# Patient Record
Sex: FEMALE | Race: WHITE | NOT HISPANIC OR LATINO | Employment: UNEMPLOYED | URBAN - METROPOLITAN AREA
[De-identification: names, ages, dates, MRNs, and addresses within clinical notes are randomized per-mention and may not be internally consistent; named-entity substitution may affect disease eponyms.]

---

## 2017-01-18 ENCOUNTER — ALLSCRIPTS OFFICE VISIT (OUTPATIENT)
Dept: OTHER | Facility: OTHER | Age: 57
End: 2017-01-18

## 2017-01-18 DIAGNOSIS — R73.01 IMPAIRED FASTING GLUCOSE: ICD-10-CM

## 2017-01-18 DIAGNOSIS — I10 ESSENTIAL (PRIMARY) HYPERTENSION: ICD-10-CM

## 2017-06-16 ENCOUNTER — ALLSCRIPTS OFFICE VISIT (OUTPATIENT)
Dept: OTHER | Facility: OTHER | Age: 57
End: 2017-06-16

## 2017-07-31 ENCOUNTER — ALLSCRIPTS OFFICE VISIT (OUTPATIENT)
Dept: OTHER | Facility: OTHER | Age: 57
End: 2017-07-31

## 2017-08-25 ENCOUNTER — ALLSCRIPTS OFFICE VISIT (OUTPATIENT)
Dept: OTHER | Facility: OTHER | Age: 57
End: 2017-08-25

## 2017-10-16 ENCOUNTER — ALLSCRIPTS OFFICE VISIT (OUTPATIENT)
Dept: OTHER | Facility: OTHER | Age: 57
End: 2017-10-16

## 2017-10-16 DIAGNOSIS — I10 ESSENTIAL (PRIMARY) HYPERTENSION: ICD-10-CM

## 2017-10-16 DIAGNOSIS — R73.01 IMPAIRED FASTING GLUCOSE: ICD-10-CM

## 2017-10-16 DIAGNOSIS — Z12.31 ENCOUNTER FOR SCREENING MAMMOGRAM FOR MALIGNANT NEOPLASM OF BREAST: ICD-10-CM

## 2017-10-16 DIAGNOSIS — R92.8 OTHER ABNORMAL AND INCONCLUSIVE FINDINGS ON DIAGNOSTIC IMAGING OF BREAST: ICD-10-CM

## 2017-10-17 NOTE — PROGRESS NOTES
Assessment  1  Hypertension (401 9) (I10)    Plan  Body mass index (BMI) of greater than 35 to 39 9 with comorbidity, Hypertension,  Impaired fasting glucose    · (1) CBC/PLT/DIFF; Status:Active; Requested for:16Oct2017;    · (1) COMPREHENSIVE METABOLIC PANEL; Status:Active; Requested for:16Oct2017;    · (1) HEMOGLOBIN A1C; Status:Active; Requested for:16Oct2017;    · (1) LIPID PANEL, FASTING; Status:Active; Requested for:16Oct2017;    · (1) MICROALBUMIN CREATININE RATIO, RANDOM URINE; Status:Active; Requested  for:16Oct2017;    · (1) TSH; Status:Active; Requested for:16Oct2017;   Hypertension    · HydroCHLOROthiazide 12 5 MG Oral Tablet   · From  Losartan Potassium 50 MG Oral Tablet take one tablet by mouth every  day To Losartan Potassium 50 MG Oral Tablet TAKE 1 TABLET TWICE DAILY   · Follow-up visit in 3 weeks Evaluation and Treatment  Follow-up  Status: Hold For -  Scheduling  Requested for: 16JWS3345    Discussion/Summary    Pt is to take Losartan 50 mg twice a day  Lab order is given and she is to return in 2-3 weeks for recheck  The patient was counseled regarding instructions for management,-- risk factor reductions,-- impressions,-- risks and benefits of treatment options,-- importance of compliance with treatment  Possible side effects of new medications were reviewed with the patient/guardian today  The treatment plan was reviewed with the patient/guardian  The patient/guardian understands and agrees with the treatment plan     Self Referrals: No      Chief Complaint  Pt  in office today c/o elevated blood pressure  Pt  reports head pressure  History of Present Illness  HPI: Pt went to a yoga class at work last week and has had elevated blood pressure readings since then  She denies SOB, chest pain  Hypertension (Follow-Up): The patient presents for follow-up of essential hypertension  She has no comorbid illnesses  Symptoms: The patient is currently asymptomatic   Associated symptoms include no headache,-- no focal neurologic deficits-- and-- no memory loss  Home monitoring: The patient checks her blood pressure regularly  Medications: the patient is adherent with her medication regimen  -- She denies medication side effects  The patient is due for a lipid panel,-- a serum creatinine,-- an eye exam-- and-- a urine microalbumin  Review of Systems    Constitutional: No fever, no chills, feels well, no tiredness, no recent weight gain or loss  Cardiovascular: as noted in HPI  Respiratory: no complaints of shortness of breath, no wheezing, no dyspnea on exertion, no orthopnea or PND  ROS reviewed  Active Problems  1  Acid reflux disease (530 81) (K21 9)   2  Body mass index (BMI) of greater than 35 to 39 9 with comorbidity   3  History of rosacea (V13 3) (Z87 2)   4  Hypertension (401 9) (I10)   5  Impaired fasting glucose (790 21) (R73 01)   6  Insomnia (780 52) (G47 00)   7  Lumbar radiculopathy (724 4) (M54 16)   8  Restless leg syndrome (333 94) (G25 81)   9  Right knee pain, unspecified chronicity (719 46) (M25 561)    Past Medical History  1  History of Asymptomatic postmenopausal state (V49 81) (Z78 0)   2  History of Colon cancer screening (V76 51) (Z12 11)   3  History of Encounter for gynecological examination without abnormal finding (V72 31)   (Z01 419)   4  History of Encounter for gynecological examination without abnormal finding (V72 31)   (Z01 419)   5  History of Encounter for mammogram to establish baseline mammogram (V76 12)   (Z12 31)   6  History of Encounter for screening mammogram for malignant neoplasm of breast   (V76 12) (Z12 31)   7  History of acne (V13 3) (Z87 2)   8  History of acute sinusitis (V12 69) (Z87 09)   9  History of common cold (V12 09) (Z86 19)   10  History of influenza vaccination (V49 89) (Z92 29)   11  History of rosacea (V13 3) (Z87 2)   12  History of tonsillitis (V12 69) (Z87 09)   13  Hypertension (401 9) (I10)   14   Impaired fasting glucose (790 21) (R73 01)   15  History of Screening for diabetes mellitus (V77 1) (Z13 1)   16  History of Screening for human papillomavirus (HPV) (V73 81) (Z11 51)   17  History of Tonsillolith (474 8) (J35 8)   18  History of URI (upper respiratory infection) (465 9) (J06 9)  Active Problems And Past Medical History Reviewed: The active problems and past medical history were reviewed and updated today  Family History  Mother    1  Family history of COPD, moderate   2  Family history of congestive heart failure (V17 49) (Z82 49)   3  Family history of hyperlipidemia (V18 19) (Z83 49)   4  Family history of hypertension (V17 49) (Z82 49)   5  Denied: Family history of mental disorder   6  Denied: Family history of substance abuse  Father    7  Family history of COPD, moderate   8  Denied: Family history of mental disorder   9  Denied: Family history of substance abuse  Maternal Aunt    10  Family history of malignant neoplasm of urinary bladder (V16 52) (Z80 52)  Family History Reviewed: The family history was reviewed and updated today  Social History   ·    · Former smoker (V33 05) (W76 395)   · History of Lives with family   · No alcohol use   · No caffeine use   · Not currently sexually active   · 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  The social history was reviewed and is unchanged  Surgical History  1  History of Excision Of Lesion Genitalia Benign   2  History of Hysterectomy   3  History of Oophorectomy Unilateral Right Side   4  History of Tubal Ligation    Current Meds   1  Losartan Potassium 50 MG Oral Tablet; take one tablet by mouth every day; Therapy: 21DAU6973 to (Isis Kent)  Requested for: 03QBE7835; Last   Rx:59Bvz1526 Ordered   2  Metoprolol Succinate  MG Oral Tablet Extended Release 24 Hour; TAKE 1   TABLET DAILY; Therapy: 41KVU8177 to (Evaluate:60Jrz7692)  Requested for: 93ZTB2988; Last   Rx:03Dng5292 Ordered   3  MetroNIDAZOLE 0 75 % External Lotion; Apply to face BID; Therapy: 09ZSG2597 to (Last Rx:90Iqq8768)  Requested for: 28UPF7655 Ordered   4  Omeprazole 20 MG Oral Capsule Delayed Release; Take one capsule once daily before   eating; Therapy: 01YEJ9259 to [phone])  Requested for: 42GQH5256; Last   Rx:38Tbs5363 Ordered    The medication list was reviewed and updated today  Allergies  1  sulfa   2  tetracycline    Vitals   Recorded: 58PIM9009 04:47PM Recorded: 96MWX5438 04:32PM   Heart Rate  95   Systolic 944 963   Diastolic 82 96   Height  5 ft 4 5 in   Weight  224 lb    BMI Calculated  37 86   BSA Calculated  2 06   O2 Saturation  96     Physical Exam    Constitutional   General appearance: No acute distress, well appearing and well nourished  Pulmonary   Respiratory effort: No increased work of breathing or signs of respiratory distress  Auscultation of lungs: Clear to auscultation  Cardiovascular   Auscultation of heart: Normal rate and rhythm, normal S1 and S2, without murmurs      Examination of extremities for edema and/or varicosities: Normal          Future Appointments    Date/Time Provider Specialty Site   12/27/2017 09:00 AM Dale Parekh, [address]     Signatures   Electronically signed by : Magan Cagle, [facility]; Oct 16 2017  5:02PM EST                       (Author)    Electronically signed by : BRYAN Roberts ; Oct 16 2017  7:12PM EST

## 2017-10-19 ENCOUNTER — APPOINTMENT (OUTPATIENT)
Dept: LAB | Facility: CLINIC | Age: 57
End: 2017-10-19
Payer: COMMERCIAL

## 2017-10-19 DIAGNOSIS — I10 ESSENTIAL (PRIMARY) HYPERTENSION: ICD-10-CM

## 2017-10-19 DIAGNOSIS — R73.01 IMPAIRED FASTING GLUCOSE: ICD-10-CM

## 2017-10-19 LAB
ALBUMIN SERPL BCP-MCNC: 3.7 G/DL (ref 3.5–5)
ALP SERPL-CCNC: 108 U/L (ref 46–116)
ALT SERPL W P-5'-P-CCNC: 33 U/L (ref 12–78)
ANION GAP SERPL CALCULATED.3IONS-SCNC: 5 MMOL/L (ref 4–13)
AST SERPL W P-5'-P-CCNC: 21 U/L (ref 5–45)
BASOPHILS # BLD AUTO: 0.02 THOUSANDS/ΜL (ref 0–0.1)
BASOPHILS NFR BLD AUTO: 0 % (ref 0–1)
BILIRUB SERPL-MCNC: 0.44 MG/DL (ref 0.2–1)
BUN SERPL-MCNC: 18 MG/DL (ref 5–25)
CALCIUM SERPL-MCNC: 9.2 MG/DL (ref 8.3–10.1)
CHLORIDE SERPL-SCNC: 106 MMOL/L (ref 100–108)
CHOLEST SERPL-MCNC: 152 MG/DL (ref 50–200)
CO2 SERPL-SCNC: 28 MMOL/L (ref 21–32)
CREAT SERPL-MCNC: 0.96 MG/DL (ref 0.6–1.3)
CREAT UR-MCNC: 84.2 MG/DL
EOSINOPHIL # BLD AUTO: 0.27 THOUSAND/ΜL (ref 0–0.61)
EOSINOPHIL NFR BLD AUTO: 3 % (ref 0–6)
ERYTHROCYTE [DISTWIDTH] IN BLOOD BY AUTOMATED COUNT: 13.5 % (ref 11.6–15.1)
EST. AVERAGE GLUCOSE BLD GHB EST-MCNC: 126 MG/DL
GFR SERPL CREATININE-BSD FRML MDRD: 65 ML/MIN/1.73SQ M
GLUCOSE P FAST SERPL-MCNC: 81 MG/DL (ref 65–99)
HBA1C MFR BLD: 6 % (ref 4.2–6.3)
HCT VFR BLD AUTO: 43.3 % (ref 34.8–46.1)
HDLC SERPL-MCNC: 39 MG/DL (ref 40–60)
HGB BLD-MCNC: 14.4 G/DL (ref 11.5–15.4)
LDLC SERPL CALC-MCNC: 90 MG/DL (ref 0–100)
LYMPHOCYTES # BLD AUTO: 2.57 THOUSANDS/ΜL (ref 0.6–4.47)
LYMPHOCYTES NFR BLD AUTO: 32 % (ref 14–44)
MCH RBC QN AUTO: 28.1 PG (ref 26.8–34.3)
MCHC RBC AUTO-ENTMCNC: 33.3 G/DL (ref 31.4–37.4)
MCV RBC AUTO: 84 FL (ref 82–98)
MICROALBUMIN UR-MCNC: <5 MG/L (ref 0–20)
MICROALBUMIN/CREAT 24H UR: <6 MG/G CREATININE (ref 0–30)
MONOCYTES # BLD AUTO: 0.61 THOUSAND/ΜL (ref 0.17–1.22)
MONOCYTES NFR BLD AUTO: 8 % (ref 4–12)
NEUTROPHILS # BLD AUTO: 4.52 THOUSANDS/ΜL (ref 1.85–7.62)
NEUTS SEG NFR BLD AUTO: 57 % (ref 43–75)
NRBC BLD AUTO-RTO: 0 /100 WBCS
PLATELET # BLD AUTO: 244 THOUSANDS/UL (ref 149–390)
PMV BLD AUTO: 10.3 FL (ref 8.9–12.7)
POTASSIUM SERPL-SCNC: 4.2 MMOL/L (ref 3.5–5.3)
PROT SERPL-MCNC: 7.6 G/DL (ref 6.4–8.2)
RBC # BLD AUTO: 5.13 MILLION/UL (ref 3.81–5.12)
SODIUM SERPL-SCNC: 139 MMOL/L (ref 136–145)
TRIGL SERPL-MCNC: 116 MG/DL
TSH SERPL DL<=0.05 MIU/L-ACNC: 1.23 UIU/ML (ref 0.36–3.74)
WBC # BLD AUTO: 8.03 THOUSAND/UL (ref 4.31–10.16)

## 2017-10-19 PROCEDURE — 82043 UR ALBUMIN QUANTITATIVE: CPT

## 2017-10-19 PROCEDURE — 85025 COMPLETE CBC W/AUTO DIFF WBC: CPT

## 2017-10-19 PROCEDURE — 80053 COMPREHEN METABOLIC PANEL: CPT

## 2017-10-19 PROCEDURE — 80061 LIPID PANEL: CPT

## 2017-10-19 PROCEDURE — 84443 ASSAY THYROID STIM HORMONE: CPT

## 2017-10-19 PROCEDURE — 83036 HEMOGLOBIN GLYCOSYLATED A1C: CPT

## 2017-10-19 PROCEDURE — 36415 COLL VENOUS BLD VENIPUNCTURE: CPT

## 2017-10-19 PROCEDURE — 82570 ASSAY OF URINE CREATININE: CPT

## 2017-10-26 ENCOUNTER — HOSPITAL ENCOUNTER (OUTPATIENT)
Dept: MAMMOGRAPHY | Facility: CLINIC | Age: 57
Discharge: HOME | End: 2017-10-26
Payer: COMMERCIAL

## 2017-10-26 DIAGNOSIS — Z12.31 ENCOUNTER FOR SCREENING MAMMOGRAM FOR MALIGNANT NEOPLASM OF BREAST: ICD-10-CM

## 2017-10-26 PROCEDURE — G0202 SCR MAMMO BI INCL CAD: HCPCS

## 2017-10-26 PROCEDURE — 77063 BREAST TOMOSYNTHESIS BI: CPT

## 2017-11-07 ENCOUNTER — ALLSCRIPTS OFFICE VISIT (OUTPATIENT)
Dept: OTHER | Facility: OTHER | Age: 57
End: 2017-11-07

## 2017-11-10 ENCOUNTER — GENERIC CONVERSION - ENCOUNTER (OUTPATIENT)
Dept: OTHER | Facility: OTHER | Age: 57
End: 2017-11-10

## 2017-11-15 ENCOUNTER — HOSPITAL ENCOUNTER (OUTPATIENT)
Dept: ULTRASOUND IMAGING | Facility: CLINIC | Age: 57
Discharge: HOME | End: 2017-11-15
Payer: COMMERCIAL

## 2017-11-15 DIAGNOSIS — R92.8 OTHER ABNORMAL AND INCONCLUSIVE FINDINGS ON DIAGNOSTIC IMAGING OF BREAST: ICD-10-CM

## 2017-11-15 PROCEDURE — 76642 ULTRASOUND BREAST LIMITED: CPT

## 2017-12-07 ENCOUNTER — GENERIC CONVERSION - ENCOUNTER (OUTPATIENT)
Dept: OTHER | Facility: OTHER | Age: 57
End: 2017-12-07

## 2017-12-14 ENCOUNTER — ALLSCRIPTS OFFICE VISIT (OUTPATIENT)
Dept: OTHER | Facility: OTHER | Age: 57
End: 2017-12-14

## 2017-12-14 DIAGNOSIS — R07.89 OTHER CHEST PAIN: ICD-10-CM

## 2017-12-14 DIAGNOSIS — I10 ESSENTIAL (PRIMARY) HYPERTENSION: ICD-10-CM

## 2017-12-16 NOTE — CONSULTS
Assessment  1  Chest pressure (786 59) (R07 89)   2  Hypertension (401 9) (I10)   3  Acid reflux disease (530 81) (K21 9)    Plan  Chest pressure    · * NM MYOCARDIAL PERFUSION SPECT (STRESS AND/OR REST); Status:Voided;    Perform:[facility]; Due:99Cax4246; Ordered; For:Chest pressure; Ordered By:Tay Zimmer;   · NM MYOCARDIAL PERFUSION SPECT (RX STRESS AND/OR REST); Status:NeedInformation - Financial Authorization; Requested for:26Ivl8055;    Perform:[facility]; Due:37Vzp2943; Ordered; For:Chest pressure; Ordered By:Tay Zimmer;  Chest pressure, Hypertension    · Follow-up visit in 1 year Evaluation and Treatment  Follow-up  Status: Hold For -Scheduling  Requested for: 71WDV1651   Ordered; For: Chest pressure, Hypertension; Ordered By: Maria Isabel Forrester Performed:  Due: 56UZU3624  Hypertension    · ECHO COMPLETE WITH CONTRAST IF INDICATED; Status:Need Information - FinancialAuthorization; Requested for:05Hxn3011;    Perform:[facility]; Due:81Byr0678; Ordered;For:Hypertension; Ordered By:Tay Zimmer;    Discussion/Summary    1  HTN: Will do ECHO and Lexiscan nuclear stress test to evaluate for ischemia  likely not due to yoga, encouraged patient to continue yoga  2  Ferol Paul Chest pressure: will do Lexiscan nuclear stress test and ECHO to evaluate for ischemia  If exams are fine, will f/u in 1 year  Patient is considered a low-intermediate risk cardiac patient  The patient has the current Goals: Lexiscan, ECHO  The patent has the current Barriers: None  Chief Complaint  consult for HTN and chest pressure      History of Present Illness  Cardiology HPI Free Text Note Form [facility]: Patient referred by PCP for HTN and chest pressure  Patient states that symptoms started about 2 months ago, when she started doing yoga  Patient has been experiencing chest pressure and elevated BP at night  Patient works out in afternoon and states that BP is elevated only at night   BP rises to 160s-180s systolic at night, and is accompanied by L sided chest pressure which is nonexertional and not related to breathing, self-limiting  Patient states that she is asymptomatic during exercise, but mildly SOB with stairs  No leg swelling  Compliant on metoprolol and losartan, had a history of palpitations before but was never diagnosed with arrhythmias  /84  No family history of cardiac events; non-smoker  Does admit to an unhealthy diet  Review of Systems     Cardiac: chest pain, but-- as noted in HPI  Skin: No complaints of nonhealing sores or skin rash  Genitourinary: No complaints of recurrent urinary tract infections, frequent urination at night, difficult urination, blood in urine, kidney stones, loss of bladder control, kidney problems, denies any birth control or hormone replacement, is not post menopausal, not currently pregnant  Psychological: No complaints of feeling depressed, anxiety, panic attacks, or difficulty concentrating  General: No complaints of trouble sleeping, lack of energy, fatigue, appetite changes, weight changes, fever, frequent infections, or night sweats  Respiratory: shortness of breath, but-- as noted in HPI  HEENT: No complaints of serious problems, hearing problems, nose problems, throat problems, or snoring  Gastrointestinal: No complaints of liver problems, nausea, vomiting, heartburn, constipation, bloody stools, diarrhea, problems swallowing, adbominal pain, or rectal bleeding  Hematologic: No complaints of bleeding disorders, anemia, blood clots, or excessive brusing  Neurological: No complaints of numbness, tingling, dizziness, weakness, seizures, headaches, syncope or fainting, AM fatigue, daytime sleepiness, no witnessed apnea episodes  Musculoskeletal: No complaints of arthritis, back pain, or painfull swelling  Active Problems  1  Abnormal mammogram of left breast (793 80) (R92 8)   2  Acid reflux disease (530 81) (K21 9)   3   Body mass index (BMI) of greater than 35 to 39 9 with comorbidity   4  Chest pressure (786 59) (R07 89)   5  History of rosacea (V13 3) (Z87 2)   6  Hypertension (401 9) (I10)   7  Impaired fasting glucose (790 21) (R73 01)   8  Insomnia (780 52) (G47 00)   9  Lumbar radiculopathy (724 4) (M54 16)   10  Restless leg syndrome (333 94) (G25 81)   11  Right knee pain, unspecified chronicity (719 46) (M25 561)   12  Visit for screening mammogram (V76 12) (Z12 31)    Past Medical History   · History of Asymptomatic postmenopausal state (V49 81) (Z78 0)   · History of Colon cancer screening (V76 51) (Z12 11)   · History of Encounter for gynecological examination without abnormal finding (V72 31)(Z01 419)   · History of Encounter for gynecological examination without abnormal finding (V72 31)(Z01 419)   · History of Encounter for mammogram to establish baseline mammogram (V76 12)(Z12 31)   · History of Encounter for screening mammogram for malignant neoplasm of breast(V76 12) (Z12 31)   · History of acne (V13 3) (Z87 2)   · History of acute sinusitis (V12 69) (Z87 09)   · History of common cold (V12 09) (Z86 19)   · History of influenza vaccination (V49 89) (Z92 29)   · History of rosacea (V13 3) (Z87 2)   · History of tonsillitis (V12 69) (Z87 09)   · Hypertension (401 9) (I10)   · Impaired fasting glucose (790 21) (R73 01)   · History of Screening for diabetes mellitus (V77 1) (Z13 1)   · History of Screening for human papillomavirus (HPV) (V73 81) (Z11 51)   · History of Tonsillolith (474 8) (J35 8)   · History of URI (upper respiratory infection) (465 9) (J06 9)    The active problems and past medical history were reviewed and updated today  Surgical History   · History of Excision Of Lesion Genitalia Benign   · History of Hysterectomy   · History of Oophorectomy Unilateral Right Side   · History of Tubal Ligation    The surgical history was reviewed and updated today         Family History  Mother    · Family history of COPD, moderate   · Family history of congestive heart failure (V17 49) (Z82 49)   · Family history of hyperlipidemia (V18 19) (Z83 49)   · Family history of hypertension (V17 49) (Z82 49)   · Denied: Family history of mental disorder   · Denied: Family history of substance abuse  Father    · Family history of COPD, moderate   · Denied: Family history of mental disorder   · Denied: Family history of substance abuse  Maternal Aunt    · Family history of malignant neoplasm of urinary bladder (V16 52) (Z80 52)  Family History Reviewed: The family history was reviewed and updated today  Social History   ·    · Former smoker (I11 13) (I90 529)   · History of Lives with family   · No alcohol use   · No caffeine use   · Not currently sexually active   · 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  The social history was reviewed and updated today  Current Meds   1  Losartan Potassium 50 MG Oral Tablet; take one tablet by mouth every day; Therapy: 89NXR6303 to (Verlealba Prom)  Requested for: 94DJZ0384; Last Rx:17Nov2017 Ordered   2  Metoprolol Succinate  MG Oral Tablet Extended Release 24 Hour; take 1 tablet by mouth every day; Therapy: 54TLR0024 to (Jae Bhatt)  Requested for: 55OIX7989; Last Rx:29Nov2017 Ordered   3  MetroNIDAZOLE 0 75 % External Lotion; Apply to face BID; Therapy: 08ENZ8065 to (Last Rx:16Jun2017)  Requested for: 23ZAG7089 Ordered   4  Omeprazole 20 MG Oral Capsule Delayed Release; take 1 capsule daily; Therapy: 18SXK5915 to (Last Rx:17Oct2017)  Requested for: 17Oct2017 Ordered    The medication list was reviewed and updated today  Allergies  1  sulfa   2  tetracycline    Vitals  Signs   Heart Rate: 90  Systolic: 058  Diastolic: 84  Height: 5 ft 4 5 in  Weight: 222 lb   BMI Calculated: 37 52  BSA Calculated: 2 06  O2 Saturation: 98    Physical Exam   Constitutional  General appearance: No acute distress, well appearing and well nourished    Eyes  Conjunctiva and Sclera examination: Conjunctiva pink, sclera anicteric  Ears, Nose, Mouth, and Throat - Oropharynx: Clear, nares are clear, mucous membranes are moist   Neck  Neck and thyroid: Normal, supple, trachea midline, no thyromegaly  Pulmonary  Respiratory effort: No increased work of breathing or signs of respiratory distress  Auscultation of lungs: Clear to auscultation, no rales, no rhonchi, no wheezing, good air movement  Cardiovascular  Auscultation of heart: Abnormal  -- Tachycardic  Carotid pulses: Normal, 2+ bilaterally  Peripheral vascular exam: Normal pulses throughout, no tenderness, erythema or swelling  Pedal pulses: Normal, 2+ bilaterally  Examination of extremities for edema and/or varicosities: Normal    Abdomen  Abdomen: Non-tender and no distention  Liver and spleen: No hepatomegaly or splenomegaly  Musculoskeletal Gait and station: Normal gait  -- Digits and nails: Normal without clubbing or cyanosis  -- Inspection/palpation of joints, bones, and muscles: Normal, ROM normal    Skin - Skin and subcutaneous tissue: Normal without rashes or lesions  Skin is warm and well perfused, normal turgor  Neurologic - Cranial nerves: II - XII intact  -- Speech: Normal    Psychiatric - Orientation to person, place, and time: Normal -- Mood and affect: Normal       Results/Data  incomplete RBBB, low voltage in precordial leads      Future Appointments    Date/Time Provider Specialty Site   12/27/2017 09:00 AM Amber Knapp, [id number] [place] [address] Road     End of Encounter Meds  1  Omeprazole 20 MG Oral Capsule Delayed Release; take 1 capsule daily; Therapy: 32XCW8670 to (Last Rx:17Oct2017)  Requested for: 17Oct2017 Ordered  2  Losartan Potassium 50 MG Oral Tablet; take one tablet by mouth every day; Therapy: 24NVW7850 to (Yared Marinelli)  Requested for: 49MEI9978; Last Rx:17Nov2017 Ordered   3   Metoprolol Succinate  MG Oral Tablet Extended Release 24 Hour; take 1 tablet by mouth every day; Therapy: 48FDY6564 to ([place] Reason)  Requested for: 38KNS9764; Last Rx:29Nov2017 Ordered  4  MetroNIDAZOLE 0 75 % External Lotion; Apply to face BID;  Therapy: 09LQM8094 to (Last Rx:16Jun2017)  Requested for: 39MCP7978 Ordered    Signatures   Electronically signed by : Jarrod Beverly, [facility]; Dec 14 2017  4:23PM EST                       (Author)    Electronically signed by : BRYAN White ; Dec 14 2017 11:39PM EST                       (Author)

## 2017-12-27 ENCOUNTER — GENERIC CONVERSION - ENCOUNTER (OUTPATIENT)
Dept: OTHER | Facility: OTHER | Age: 57
End: 2017-12-27

## 2018-01-10 NOTE — PROGRESS NOTES
Assessment    1  Encounter for preventive health examination (V70 0) (Z00 00)   2  Hypertension (401 9) (I10)    Plan  Hypertension    · Losartan Potassium 50 MG Oral Tablet; TAKE 1 TABLET TWICE DAILY   · Follow-up visit in 1 month Evaluation and Treatment  Follow-up  Status: Complete  Done:  [id number]    Discussion/Summary  health maintenance visit Currently, she eats a healthy diet and has an adequate exercise regimen  cervical cancer screening is current Breast cancer screening: mammogram is current  Colorectal cancer screening: colorectal cancer screening is current  Advice and education were given regarding aerobic exercise, weight loss and cardiovascular risk reduction  Patient discussion: discussed with the patient  Patient is to take her medications in the morning as she usually does and in the afternoon she is to take it earlier then previously taken especially on days that she does physical activity  Her labs were all normal  She had her mammogram we do not have the results yet  Patient is to follow up in 1 month to recheck her blood pressure and her symptoms  The patient was counseled regarding diagnostic results, instructions for management, risk factor reductions, impressions, risks and benefits of treatment options, importance of compliance with treatment  Possible side effects of new medications were reviewed with the patient/guardian today  The treatment plan was reviewed with the patient/guardian  The patient/guardian understands and agrees with the treatment plan     Self Referrals: No      Chief Complaint  Pt  in office today for a check up  History of Present Illness  , Adult Female: The patient is being seen for a health maintenance evaluation  The last health maintenance visit was 1 year(s) ago  Social History: She is   Work status: working full time  The patient is a former cigarette smoker  She reports never drinking alcohol  She has never used illicit drugs  General Health: The patient's health since the last visit is described as good  She complains of vision problems  Vision care includes wearing glasses and having regular eye examinations  Immunizations status: up to date  Lifestyle:  She consumes a diverse and healthy diet  She has weight concerns  She exercises regularly  She does not use tobacco  She denies alcohol use  She denies drug use  Reproductive health: the patient is postmenopausal   she is not sexually active  Screening:   HPI: 65-year-old for her health maintenance examination  She is also here for follow-up of her hypertension which we have been trying to get under better control  Patient has been exercising and doing yoga  She notes that on the days that she does exercise and does yoga her blood pressure is higher in the evening even 4 hours after finishing any activities  She is taking the losartan and metoprolol in the morning and in the 2nd dose of losartan in the evening  When her blood pressures are higher she does note that she does have a head pressure and headaches  The highest that she has noted is probably about 150s over 90s  Hypertension (Follow-Up): The patient presents for follow-up of essential hypertension  The patient states she has been stable with her blood pressure control since the last visit  She has no comorbid illnesses  She has no significant interval events  Symptoms: The patient is currently asymptomatic  Associated symptoms include headache, but no focal neurologic deficits and no memory loss  Home monitoring: The patient checks her blood pressure regularly  Blood pressure control has been poor  Medications: the patient is adherent with her medication regimen  She denies medication side effects  Review of Systems    Constitutional: No fever, no chills, feels well, no tiredness, no recent weight gain or weight loss     Eyes: No complaints of eye pain, no red eyes, no eyesight problems, no discharge, no dry eyes, no itching of eyes  ENT: no complaints of earache, no loss of hearing, no nose bleeds, no nasal discharge, no sore throat, no hoarseness  Cardiovascular: as noted in HPI  Respiratory: No complaints of shortness of breath, no wheezing, no cough, no SOB on exertion, no orthopnea, no PND  Gastrointestinal: No complaints of abdominal pain, no constipation, no nausea or vomiting, no diarrhea, no bloody stools  Genitourinary: No complaints of dysuria, no incontinence, no pelvic pain, no dysmenorrhea, no vaginal discharge or bleeding  Musculoskeletal: No complaints of arthralgias, no myalgias, no joint swelling or stiffness, no limb pain or swelling  Integumentary: No complaints of skin rash or lesions, no itching, no skin wounds, no breast pain or lump  Neurological: as noted in HPI  Psychiatric: Not suicidal, no sleep disturbance, no anxiety or depression, no change in personality, no emotional problems  Endocrine: No complaints of proptosis, no hot flashes, no muscle weakness, no deepening of the voice, no feelings of weakness  Hematologic/Lymphatic: No complaints of swollen glands, no swollen glands in the neck, does not bleed easily, does not bruise easily  ROS reviewed  Active Problems    1  Acid reflux disease (530 81) (K21 9)   2  Body mass index (BMI) of greater than 35 to 39 9 with comorbidity   3  History of rosacea (V13 3) (Z87 2)   4  Hypertension (401 9) (I10)   5  Impaired fasting glucose (790 21) (R73 01)   6  Insomnia (780 52) (G47 00)   7  Lumbar radiculopathy (724 4) (M54 16)   8  Restless leg syndrome (333 94) (G25 81)   9  Right knee pain, unspecified chronicity (719 46) (M25 561)   10   Visit for screening mammogram (V76 12) (Z12 31)    Past Medical History    · History of Asymptomatic postmenopausal state (V49 81) (Z78 0)   · History of Colon cancer screening (V76 51) (Z12 11)   · History of Encounter for gynecological examination without abnormal finding (V72 31)  (Z01 419)   · History of Encounter for gynecological examination without abnormal finding (V72 31)  (Z01 419)   · History of Encounter for mammogram to establish baseline mammogram (V76 12)  (Z12 31)   · History of Encounter for screening mammogram for malignant neoplasm of breast  (V76 12) (Z12 31)   · History of acne (V13 3) (Z87 2)   · History of acute sinusitis (V12 69) (Z87 09)   · History of common cold (V12 09) (Z86 19)   · History of influenza vaccination (V49 89) (Z92 29)   · History of rosacea (V13 3) (Z87 2)   · History of tonsillitis (V12 69) (Z87 09)   · Hypertension (401 9) (I10)   · Impaired fasting glucose (790 21) (R73 01)   · History of Screening for diabetes mellitus (V77 1) (Z13 1)   · History of Screening for human papillomavirus (HPV) (V73 81) (Z11 51)   · History of Tonsillolith (474 8) (J35 8)   · History of URI (upper respiratory infection) (465 9) (J06 9)    Surgical History    · History of Excision Of Lesion Genitalia Benign   · History of Hysterectomy   · History of Oophorectomy Unilateral Right Side   · History of Tubal Ligation    Family History  Mother    · Family history of COPD, moderate   · Family history of congestive heart failure (V17 49) (Z82 49)   · Family history of hyperlipidemia (V18 19) (Z83 49)   · Family history of hypertension (V17 49) (Z82 49)   · Denied: Family history of mental disorder   · Denied: Family history of substance abuse  Father    · Family history of COPD, moderate   · Denied: Family history of mental disorder   · Denied: Family history of substance abuse  Maternal Aunt    · Family history of malignant neoplasm of urinary bladder (V16 52) (Z80 52)    Social History    ·    · Former smoker (V15 82) (B34 108)   · History of Lives with family   · No alcohol use   · No caffeine use   · Not currently sexually active   · 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    Current Meds   1   Losartan Potassium 50 MG Oral Tablet; TAKE 1 TABLET TWICE DAILY; Therapy: 46IET5708 to (Evaluate:14Jan2018)  Requested for: 71DTV7913; Last   Rx:16Oct2017 Ordered   2  Metoprolol Succinate  MG Oral Tablet Extended Release 24 Hour; TAKE 1   TABLET DAILY; Therapy: 90XFI1757 to (Evaluate:35Jwq7892)  Requested for: 86ESO6981; Last   Rx:16Anb0675 Ordered   3  MetroNIDAZOLE 0 75 % External Lotion; Apply to face BID; Therapy: 33FRF7459 to (Last Rx:16Jun2017)  Requested for: 82HYX7086 Ordered   4  Omeprazole 20 MG Oral Capsule Delayed Release; take 1 capsule daily; Therapy: 56GWH5765 to (Last Rx:17Oct2017)  Requested for: 17Oct2017 Ordered    Allergies    1  sulfa   2  tetracycline    Vitals   Recorded: 52CSV7571 09:18AM   Heart Rate 97   Systolic 463   Diastolic 88   Height 5 ft 4 5 in   Weight 219 lb    BMI Calculated 37 01   BSA Calculated 2 04   O2 Saturation 98     Physical Exam    Constitutional   General appearance: No acute distress, well appearing and well nourished  Eyes   Conjunctiva and lids: No swelling, erythema or discharge  Pupils and irises: Equal, round and reactive to light  Ears, Nose, Mouth, and Throat   External inspection of ears and nose: Normal     Otoscopic examination: Tympanic membranes translucent with normal light reflex  Canals patent without erythema  Oropharynx: Normal with no erythema, edema, exudate or lesions  Pulmonary   Respiratory effort: No increased work of breathing or signs of respiratory distress  Auscultation of lungs: Clear to auscultation  Cardiovascular   Palpation of heart: Normal PMI, no thrills  Auscultation of heart: Normal rate and rhythm, normal S1 and S2, without murmurs  Examination of extremities for edema and/or varicosities: Normal     Abdomen   Abdomen: Non-tender, no masses  Liver and spleen: No hepatomegaly or splenomegaly  Lymphatic   Palpation of lymph nodes in neck: No lymphadenopathy      Musculoskeletal   Gait and station: Normal     Digits and nails: Normal without clubbing or cyanosis  Inspection/palpation of joints, bones, and muscles: Normal     Skin   Skin and subcutaneous tissue: Normal without rashes or lesions  Neurologic   Cranial nerves: Cranial nerves 2-12 intact  Reflexes: 2+ and symmetric  Sensation: No sensory loss  Psychiatric   Orientation to person, place, and time: Normal     Mood and affect: Normal        Health Management  History of Colon cancer screening   COLONOSCOPY; every 10 years; Last 51Nxu1164; Next Due: 12Nhf7780;  Active    Future Appointments    Date/Time Provider Specialty Site   12/27/2017 09:00 AM Amado Markham, [address]     Signatures   Electronically signed by : Carlos Roy; Nov 7 2017  9:39AM EST                       (Author)    Electronically signed by : BRYAN Roberson ; Nov 7 2017 10:07AM EST

## 2018-01-11 NOTE — RESULT NOTES
Verified Results  (B) PAP WITH HPV PLUS [id number] 04:38PM Kamila Adair     Test Name Result Flag Reference   PAP, LIQUID-BASED NILM     DIAGNOSIS:            Negative for intraepithelial lesion or malignancy  ADEQUACY:             Satisfactory for evaluation / No endocervical/                         transformation zone component present, consistent                         with status-post hysterectomy  COMMENT:              This Pap smear was screened with the assistance                         of the [employer]ana ThinPrep(TM) Imaging System and                         screened by a cytotechnologist   SPECIMEN SOURCE:      LIQUID-BASED PAP + HPV PLUS, VAGINAL  CLINICAL INFORMATION: LMP: N/A                        Hysterectomy                        Provided Diagnosis Codes: Z01 419,Z11 51                                                Cervicovaginal cytology should be considered a                         screening procedure subject to false negatives                         and false positives  Results are more reliable                         when a satisfactory sample is obtained on a                         regular repetitive basis, and should be                         interpreted together with past and current                         clinical data  ELECTRONICALLY SIGNED   BY:                   Screened By: RONY Cronin (ASCP)   Case                         Electronically Signed 11/03/2016   HPV HR (NON 16/18) Not Detected     HPV 18+ Not Detected     HPV16+ Not Detected     HPV HR(NON 16/18) (1,2,3,4)  HPV 18+ (1,2,3,4)  HPV16+ (1,2,3,4)  (1)  The karla(R) HPV test is FDA-cleared for ThinPrep(R) specimens and   detects genomic HPV DNA in the polymorphic L1 region in 14 subtypes:   Type 16, Type 18, and other high risk types   (88,70,51,65,96,38,74,37,50,62,51,66)  The test has been modified and   validated for use in SurePath(TM) specimens    (2)  HPV types 16 and/or 18 that were Not Detected were undetectable or   below the pre-set threshold  (3)  The non-repeat rate for HPV genotyping assays varies from 5 to 15%  In   the NILM cytology category, there is a low positive predictive value   (PPV = 15-20%) for CIN2+ with a positive high risk HPV result  (4)  Results should be interpreted together with past and current clinical   and laboratory data

## 2018-01-13 VITALS
BODY MASS INDEX: 37.15 KG/M2 | DIASTOLIC BLOOD PRESSURE: 82 MMHG | SYSTOLIC BLOOD PRESSURE: 126 MMHG | WEIGHT: 223 LBS | OXYGEN SATURATION: 98 % | HEIGHT: 65 IN | HEART RATE: 101 BPM

## 2018-01-14 VITALS
BODY MASS INDEX: 37.32 KG/M2 | OXYGEN SATURATION: 96 % | HEART RATE: 95 BPM | SYSTOLIC BLOOD PRESSURE: 138 MMHG | HEIGHT: 65 IN | WEIGHT: 224 LBS | DIASTOLIC BLOOD PRESSURE: 82 MMHG

## 2018-01-14 VITALS
HEART RATE: 71 BPM | HEIGHT: 65 IN | WEIGHT: 223 LBS | DIASTOLIC BLOOD PRESSURE: 80 MMHG | OXYGEN SATURATION: 97 % | SYSTOLIC BLOOD PRESSURE: 122 MMHG | BODY MASS INDEX: 37.15 KG/M2

## 2018-01-14 VITALS
SYSTOLIC BLOOD PRESSURE: 122 MMHG | WEIGHT: 219 LBS | OXYGEN SATURATION: 98 % | BODY MASS INDEX: 36.49 KG/M2 | HEIGHT: 65 IN | HEART RATE: 97 BPM | DIASTOLIC BLOOD PRESSURE: 88 MMHG

## 2018-01-14 VITALS
SYSTOLIC BLOOD PRESSURE: 122 MMHG | HEIGHT: 65 IN | BODY MASS INDEX: 37.15 KG/M2 | DIASTOLIC BLOOD PRESSURE: 80 MMHG | HEART RATE: 98 BPM | WEIGHT: 223 LBS | OXYGEN SATURATION: 99 %

## 2018-01-14 VITALS
OXYGEN SATURATION: 98 % | HEART RATE: 97 BPM | WEIGHT: 223 LBS | DIASTOLIC BLOOD PRESSURE: 88 MMHG | BODY MASS INDEX: 37.15 KG/M2 | HEIGHT: 65 IN | SYSTOLIC BLOOD PRESSURE: 132 MMHG

## 2018-01-15 NOTE — RESULT NOTES
Verified Results  MAMMO SCREENING BILATERAL W 3D & CAD [id number] 08:59AM Yu Adan Order Number: [id number]    - Patient Instructions: To schedule this appointment, please contact Central Scheduling at [phone]  Do not wear any perfume, powder, lotion or deodorant on breast or underarm area  Please bring your doctors order, referral (if needed) and insurance information with you on the day of the test  Failure to bring this information may result in this test being rescheduled  Arrive 15 minutes prior to your appointment time to register  On the day of your test, please bring any prior mammogram or breast studies with you that were not performed at a [facility]  Failure to bring prior exams may result in your test needing to be rescheduled   Order Number: [id number]    - Patient Instructions: To schedule this appointment, please contact Central Scheduling at [phone]  Do not wear any perfume, powder, lotion or deodorant on breast or underarm area  Please bring your doctors order, referral (if needed) and insurance information with you on the day of the test  Failure to bring this information may result in this test being rescheduled  Arrive 15 minutes prior to your appointment time to register  On the day of your test, please bring any prior mammogram or breast studies with you that were not performed at a [facility]  Failure to bring prior exams may result in your test needing to be rescheduled  TW Order Number: [id number]    - Patient Instructions: To schedule this appointment, please contact Central Scheduling at [phone]  Do not wear any perfume, powder, lotion or deodorant on breast or underarm area  Please bring your doctors order, referral (if needed) and insurance information with you on the day of the test  Failure to bring this information may result in this test being rescheduled   Arrive 15 minutes prior to your appointment time to register  On the day of your test, please bring any prior mammogram or breast studies with you that were not performed at a [facility]  Failure to bring prior exams may result in your test needing to be rescheduled  Test Name Result Flag Reference   MAMMO SCREENING BILATERAL W 3D & CAD (Report)     Patient History:   Patient is postmenopausal and had first child at age 38  Family history of breast cancer at age 79 in paternal    grandmother, prostate cancer at age 79 in father  Patient is a former smoker  Patient's BMI is 38 4  Reason for exam: screening, asymptomatic  Screening     Mammo Screening Bilateral W DBT and CAD: October 26, 2017 - Check   In #: [de-identified]   2D/3D Procedure   3D views: Bilateral MLO view(s) were taken  2D views: Bilateral MLO and CC view(s) were taken  Technologist: RAVEN Craig (R)(M)   The breast tissue is almost entirely fat  There is asymmetry in    the left lower inner quadrant for which hand-held ultrasound    characterization is recommended  The location is about 8 o'clock   position 5 to 6 cm below the nipple  The overall appearance and   configuration favors possible scar tissue although there is no    scar marker present on the breast  There are some dystrophic    calcifications in both breasts but no pleomorphic malignant    appearing calcification clusters  There are no dominant or    spiculated masses  Skin and nipple profiles appear within normal   limits  Axillary lymph nodes are within normal limits  Needs additional imaging  ACR BI-RADSï¾® Assessments: BiRad:0 - Incomplete: needs additional    imaging evaluation - Left     Recommendation:   Ultrasound of the left breast    A breast health care nurse from our facility will be contacting    the patient regarding the need for additional imaging  The patient is scheduled in a reminder system for screening    mammography       8-10% of cancers will be missed on mammography  Management of a    palpable abnormality must be based on clinical grounds  Patients    will be notified of their results via letter from our facility  Accredited by [employer] of Radiology and FDA       Transcription Location: RAVEN Valentine 98: [id number]     Risk Value(s):   Brandyner-Cuzick 10 Year: 6 400%, Tyrer-Cuvibha Lifetime: 17 000%,    Myriad Table: 1 5%, ALEJANDRO 5 Year: 1 8%, NCI Lifetime: 10 5%   Signed by:   Farhan Sanchez MD   11/10/17

## 2018-01-17 NOTE — MISCELLANEOUS
Message  Records review from [facility]:  Patient had seen Ayden Aguilar for annual in 2013 and Dr Ronald Ramos in 2012- hysterectomy was done in 2006 for bleeding  Partial hysterectomy done for dysfunctional uterine bleeding and atypical hyperplasia  Right salpingectomy history  Patient also saw Dr  [place] in 2011 and was taken to the OR for perineal introital cyst which was causing dyspareunia  2010 she saw me for vaginal dryness  Pap smears showed ASCUS in 2012 but HPV negative  Paps normal 2008, 2007, 2010, 2011  Mammograms normal from 2008 through 2014      Signatures   Electronically signed by : MATTI Dejesus; Dec  9 2016  4:22PM EST                       (Author)

## 2018-01-22 VITALS
BODY MASS INDEX: 36.99 KG/M2 | OXYGEN SATURATION: 98 % | HEART RATE: 90 BPM | SYSTOLIC BLOOD PRESSURE: 124 MMHG | WEIGHT: 222 LBS | HEIGHT: 65 IN | DIASTOLIC BLOOD PRESSURE: 84 MMHG

## 2018-01-24 VITALS — SYSTOLIC BLOOD PRESSURE: 134 MMHG | DIASTOLIC BLOOD PRESSURE: 82 MMHG

## 2018-01-24 VITALS
OXYGEN SATURATION: 96 % | BODY MASS INDEX: 36.85 KG/M2 | WEIGHT: 221.19 LBS | HEART RATE: 94 BPM | SYSTOLIC BLOOD PRESSURE: 150 MMHG | HEIGHT: 65 IN | DIASTOLIC BLOOD PRESSURE: 102 MMHG

## 2018-01-24 VITALS
HEIGHT: 65 IN | BODY MASS INDEX: 36.99 KG/M2 | OXYGEN SATURATION: 98 % | DIASTOLIC BLOOD PRESSURE: 86 MMHG | SYSTOLIC BLOOD PRESSURE: 134 MMHG | HEART RATE: 96 BPM | WEIGHT: 222 LBS

## 2018-01-24 VITALS — SYSTOLIC BLOOD PRESSURE: 120 MMHG | DIASTOLIC BLOOD PRESSURE: 78 MMHG

## 2018-01-31 DIAGNOSIS — K21.9 GASTROESOPHAGEAL REFLUX DISEASE, ESOPHAGITIS PRESENCE NOT SPECIFIED: Primary | ICD-10-CM

## 2018-01-31 DIAGNOSIS — K21.9 GASTROESOPHAGEAL REFLUX DISEASE, ESOPHAGITIS PRESENCE NOT SPECIFIED: ICD-10-CM

## 2018-01-31 RX ORDER — OMEPRAZOLE 20 MG/1
CAPSULE, DELAYED RELEASE ORAL
Qty: 90 CAPSULE | Refills: 0 | Status: SHIPPED | OUTPATIENT
Start: 2018-01-31 | End: 2018-01-31 | Stop reason: SDUPTHER

## 2018-01-31 RX ORDER — OMEPRAZOLE 20 MG/1
20 CAPSULE, DELAYED RELEASE ORAL DAILY
Qty: 90 CAPSULE | Refills: 0 | Status: SHIPPED | OUTPATIENT
Start: 2018-01-31 | End: 2018-07-27 | Stop reason: SDUPTHER

## 2018-03-29 DIAGNOSIS — M25.50 ARTHRALGIA, UNSPECIFIED JOINT: ICD-10-CM

## 2018-03-29 DIAGNOSIS — M25.50 ARTHRALGIA, UNSPECIFIED JOINT: Primary | ICD-10-CM

## 2018-03-29 RX ORDER — METOPROLOL SUCCINATE 100 MG/1
1 TABLET, EXTENDED RELEASE ORAL DAILY
COMMUNITY
Start: 2016-11-28 | End: 2018-11-26 | Stop reason: SDUPTHER

## 2018-03-29 RX ORDER — IBUPROFEN 600 MG/1
600 TABLET ORAL EVERY 6 HOURS PRN
Qty: 30 TABLET | Refills: 0 | Status: SHIPPED | OUTPATIENT
Start: 2018-03-29 | End: 2018-03-29 | Stop reason: SDUPTHER

## 2018-03-29 RX ORDER — IBUPROFEN 600 MG/1
600 TABLET ORAL EVERY 6 HOURS PRN
Qty: 90 TABLET | Refills: 2 | Status: SHIPPED | OUTPATIENT
Start: 2018-03-29 | End: 2018-04-04 | Stop reason: SDUPTHER

## 2018-03-29 RX ORDER — AMLODIPINE BESYLATE 5 MG/1
TABLET ORAL
COMMUNITY
Start: 2018-03-08 | End: 2019-12-26

## 2018-03-29 RX ORDER — METRONIDAZOLE 7.5 MG/G
LOTION TOPICAL
COMMUNITY
Start: 2015-10-27 | End: 2019-05-24 | Stop reason: SDUPTHER

## 2018-04-04 DIAGNOSIS — M25.50 ARTHRALGIA, UNSPECIFIED JOINT: ICD-10-CM

## 2018-04-04 RX ORDER — IBUPROFEN 600 MG/1
600 TABLET ORAL EVERY 6 HOURS PRN
Qty: 90 TABLET | Refills: 0 | Status: SHIPPED | OUTPATIENT
Start: 2018-04-04 | End: 2019-04-24 | Stop reason: SDUPTHER

## 2018-04-27 DIAGNOSIS — K21.9 GASTROESOPHAGEAL REFLUX DISEASE, ESOPHAGITIS PRESENCE NOT SPECIFIED: ICD-10-CM

## 2018-04-30 RX ORDER — OMEPRAZOLE 20 MG/1
CAPSULE, DELAYED RELEASE ORAL
Qty: 90 CAPSULE | Refills: 0 | Status: SHIPPED | OUTPATIENT
Start: 2018-04-30 | End: 2018-07-27 | Stop reason: SDUPTHER

## 2018-07-27 DIAGNOSIS — K21.9 GASTROESOPHAGEAL REFLUX DISEASE, ESOPHAGITIS PRESENCE NOT SPECIFIED: ICD-10-CM

## 2018-07-27 RX ORDER — OMEPRAZOLE 20 MG/1
CAPSULE, DELAYED RELEASE ORAL
Qty: 90 CAPSULE | Refills: 0 | Status: SHIPPED | OUTPATIENT
Start: 2018-07-27 | End: 2018-10-12 | Stop reason: ALTCHOICE

## 2018-10-12 ENCOUNTER — OFFICE VISIT (OUTPATIENT)
Dept: FAMILY MEDICINE CLINIC | Facility: CLINIC | Age: 58
End: 2018-10-12
Payer: COMMERCIAL

## 2018-10-12 VITALS — WEIGHT: 221 LBS | HEIGHT: 64 IN | BODY MASS INDEX: 37.73 KG/M2

## 2018-10-12 DIAGNOSIS — Z13.220 SCREENING FOR LIPID DISORDERS: ICD-10-CM

## 2018-10-12 DIAGNOSIS — Z12.31 SCREENING MAMMOGRAM, ENCOUNTER FOR: ICD-10-CM

## 2018-10-12 DIAGNOSIS — Z23 NEED FOR INFLUENZA VACCINATION: ICD-10-CM

## 2018-10-12 DIAGNOSIS — K21.9 GASTROESOPHAGEAL REFLUX DISEASE, ESOPHAGITIS PRESENCE NOT SPECIFIED: ICD-10-CM

## 2018-10-12 DIAGNOSIS — R73.01 IMPAIRED FASTING GLUCOSE: ICD-10-CM

## 2018-10-12 DIAGNOSIS — I10 ESSENTIAL HYPERTENSION: Primary | ICD-10-CM

## 2018-10-12 PROBLEM — G25.81 RESTLESS LEG SYNDROME: Status: ACTIVE | Noted: 2017-01-18

## 2018-10-12 PROBLEM — G47.00 INSOMNIA: Status: ACTIVE | Noted: 2017-06-16

## 2018-10-12 PROCEDURE — 90686 IIV4 VACC NO PRSV 0.5 ML IM: CPT

## 2018-10-12 PROCEDURE — 99213 OFFICE O/P EST LOW 20 MIN: CPT | Performed by: PHYSICIAN ASSISTANT

## 2018-10-12 PROCEDURE — 90471 IMMUNIZATION ADMIN: CPT

## 2018-10-12 RX ORDER — FAMOTIDINE 20 MG/1
20 TABLET, FILM COATED ORAL 2 TIMES DAILY
Qty: 30 TABLET | Refills: 5 | Status: SHIPPED | OUTPATIENT
Start: 2018-10-12 | End: 2018-11-28 | Stop reason: ALTCHOICE

## 2018-10-12 RX ORDER — AZILSARTAN KAMEDOXOMIL 40 MG/1
40 TABLET ORAL DAILY
Refills: 3 | COMMUNITY
Start: 2018-09-14 | End: 2021-08-17 | Stop reason: ALTCHOICE

## 2018-10-12 NOTE — PROGRESS NOTES
Assessment/Plan:       Diagnoses and all orders for this visit:    Essential hypertension  -     Comprehensive metabolic panel; Future  -     TSH, 3rd generation with Free T4 reflex; Future  -     Microalbumin / creatinine urine ratio    Impaired fasting glucose  -     Comprehensive metabolic panel; Future  -     HEMOGLOBIN A1C W/ EAG ESTIMATION; Future    Gastroesophageal reflux disease, esophagitis presence not specified  -     famotidine (PEPCID) 20 mg tablet; Take 1 tablet (20 mg total) by mouth 2 (two) times a day  -     CBC and differential; Future    Screening for lipid disorders  -     Lipid Panel with Direct LDL reflex; Future    Screening mammogram, encounter for  -     Mammo screening bilateral w 3d & cad; Future    Other orders  -     EDARBI 40 MG tablet; Take 40 mg by mouth daily            Subjective:      Patient ID: Sarah Francis is a 60 y o  female  Patient is here for six-month follow-up  She notes that she has had slight congestion and postnasal drainage  Overall she is feeling well  The following portions of the patient's history were reviewed and updated as appropriate:   She has a past medical history of Hypertension; Impaired fasting glucose; and Pain ,   does not have any pertinent problems on file  ,   has a past surgical history that includes Tubal ligation; Hysterectomy; pr colonoscopy flx dx w/collj spec when pfrmd (N/A, 7/21/2016); Other surgical history; and Oophorectomy  ,  family history includes COPD in her father and mother; Cancer in her maternal aunt; Heart failure in her mother; Hyperlipidemia in her mother; Hypertension in her mother  ,   reports that she has never smoked  She has never used smokeless tobacco  She reports that she does not drink alcohol or use drugs  ,  is allergic to erythromycin; sulfa antibiotics; and tetracycline     Current Outpatient Prescriptions   Medication Sig Dispense Refill    EDARBI 40 MG tablet Take 40 mg by mouth daily  3    ibuprofen (MOTRIN) 600 mg tablet Take 1 tablet (600 mg total) by mouth every 6 (six) hours as needed for mild pain 90 tablet 0    metoprolol succinate (TOPROL-XL) 100 mg 24 hr tablet Take 1 tablet by mouth daily      METRONIDAZOLE, TOPICAL, 0 75 % LOTN Apply topically      amLODIPine (NORVASC) 5 mg tablet       famotidine (PEPCID) 20 mg tablet Take 1 tablet (20 mg total) by mouth 2 (two) times a day 30 tablet 5    losartan (COZAAR) 50 mg tablet Take 50 mg by mouth daily  No current facility-administered medications for this visit  Review of Systems   Constitutional: Negative for activity change, chills, fatigue and fever  HENT: Positive for congestion and postnasal drip  Negative for mouth sores, nosebleeds, sinus pressure and trouble swallowing  Eyes: Negative for pain  Respiratory: Negative for chest tightness and shortness of breath  Cardiovascular: Negative for chest pain, palpitations and leg swelling  Gastrointestinal: Positive for abdominal pain  Endocrine: Negative  Genitourinary: Negative for difficulty urinating and pelvic pain  Musculoskeletal: Negative  Skin: Negative  Allergic/Immunologic: Negative  Neurological: Negative for dizziness, speech difficulty, weakness, light-headedness and headaches  Hematological: Negative  Psychiatric/Behavioral: Negative  Objective:  Vitals:    10/12/18 1325   Weight: 100 kg (221 lb)   Height: 5' 4" (1 626 m)     Body mass index is 37 93 kg/m²  Physical Exam   Constitutional: She is oriented to person, place, and time  She appears well-developed and well-nourished  HENT:   Head: Normocephalic  Right Ear: External ear normal    Left Ear: External ear normal    Mouth/Throat: Oropharynx is clear and moist    Eyes: Pupils are equal, round, and reactive to light  Conjunctivae are normal    Neck: Normal range of motion  Carotid bruit is not present  No thyromegaly present     Cardiovascular: Normal rate, regular rhythm, normal heart sounds and intact distal pulses  Pulmonary/Chest: Effort normal and breath sounds normal    Abdominal: Soft  Bowel sounds are normal  She exhibits no mass  Musculoskeletal: Normal range of motion  Lymphadenopathy:     She has no cervical adenopathy  Neurological: She is alert and oriented to person, place, and time  Skin: Skin is dry  Psychiatric: She has a normal mood and affect  Her behavior is normal  Judgment and thought content normal    Nursing note and vitals reviewed

## 2018-11-26 ENCOUNTER — TELEPHONE (OUTPATIENT)
Dept: FAMILY MEDICINE CLINIC | Facility: CLINIC | Age: 58
End: 2018-11-26

## 2018-11-26 DIAGNOSIS — I10 HYPERTENSION, ESSENTIAL: Primary | ICD-10-CM

## 2018-11-26 RX ORDER — METOPROLOL SUCCINATE 100 MG/1
100 TABLET, EXTENDED RELEASE ORAL DAILY
Qty: 30 TABLET | Refills: 5 | Status: SHIPPED | OUTPATIENT
Start: 2018-11-26 | End: 2019-03-22 | Stop reason: SDUPTHER

## 2018-11-28 ENCOUNTER — TELEPHONE (OUTPATIENT)
Dept: FAMILY MEDICINE CLINIC | Facility: CLINIC | Age: 58
End: 2018-11-28

## 2018-11-28 DIAGNOSIS — K21.9 GASTROESOPHAGEAL REFLUX DISEASE WITHOUT ESOPHAGITIS: Primary | ICD-10-CM

## 2018-11-28 RX ORDER — OMEPRAZOLE 40 MG/1
40 CAPSULE, DELAYED RELEASE ORAL DAILY
Qty: 30 CAPSULE | Refills: 2 | Status: SHIPPED | OUTPATIENT
Start: 2018-11-28 | End: 2018-11-29 | Stop reason: ALTCHOICE

## 2018-11-28 NOTE — TELEPHONE ENCOUNTER
Pt said she doesn't want to take Omeprazole because of the kidney disease risk , she was hoping that you could just put her on something else - did not really want to see a gastro doctor - call back

## 2018-11-28 NOTE — TELEPHONE ENCOUNTER
Patient called to state the Famotidine 20mg is not helping and would like something stronger  Please advise

## 2018-11-29 DIAGNOSIS — K21.9 GASTROESOPHAGEAL REFLUX DISEASE WITHOUT ESOPHAGITIS: Primary | ICD-10-CM

## 2018-11-29 RX ORDER — CIMETIDINE 400 MG/1
400 TABLET, FILM COATED ORAL 2 TIMES DAILY
Qty: 60 TABLET | Refills: 3 | Status: SHIPPED | OUTPATIENT
Start: 2018-11-29 | End: 2018-12-21 | Stop reason: ALTCHOICE

## 2018-12-05 ENCOUNTER — OFFICE VISIT (OUTPATIENT)
Dept: FAMILY MEDICINE CLINIC | Facility: CLINIC | Age: 58
End: 2018-12-05
Payer: COMMERCIAL

## 2018-12-05 ENCOUNTER — HOSPITAL ENCOUNTER (OUTPATIENT)
Dept: MAMMOGRAPHY | Facility: CLINIC | Age: 58
Discharge: HOME | End: 2018-12-05
Payer: COMMERCIAL

## 2018-12-05 VITALS — BODY MASS INDEX: 37.56 KG/M2 | HEIGHT: 64 IN | WEIGHT: 220 LBS

## 2018-12-05 VITALS
SYSTOLIC BLOOD PRESSURE: 134 MMHG | HEART RATE: 102 BPM | OXYGEN SATURATION: 94 % | HEIGHT: 64 IN | BODY MASS INDEX: 37.56 KG/M2 | DIASTOLIC BLOOD PRESSURE: 82 MMHG | TEMPERATURE: 98.2 F | WEIGHT: 220 LBS

## 2018-12-05 DIAGNOSIS — Z12.31 ENCOUNTER FOR SCREENING MAMMOGRAM FOR MALIGNANT NEOPLASM OF BREAST: ICD-10-CM

## 2018-12-05 DIAGNOSIS — R05.3 PERSISTENT COUGH FOR 3 WEEKS OR LONGER: Primary | ICD-10-CM

## 2018-12-05 DIAGNOSIS — K21.9 GASTROESOPHAGEAL REFLUX DISEASE, ESOPHAGITIS PRESENCE NOT SPECIFIED: ICD-10-CM

## 2018-12-05 PROCEDURE — 77063 BREAST TOMOSYNTHESIS BI: CPT

## 2018-12-05 PROCEDURE — 99213 OFFICE O/P EST LOW 20 MIN: CPT | Performed by: PHYSICIAN ASSISTANT

## 2018-12-05 PROCEDURE — 77067 SCR MAMMO BI INCL CAD: CPT

## 2018-12-05 PROCEDURE — 3008F BODY MASS INDEX DOCD: CPT | Performed by: PHYSICIAN ASSISTANT

## 2018-12-05 RX ORDER — BENZONATATE 200 MG/1
200 CAPSULE ORAL 3 TIMES DAILY PRN
Qty: 30 CAPSULE | Refills: 1 | Status: SHIPPED | OUTPATIENT
Start: 2018-12-05 | End: 2018-12-21 | Stop reason: ALTCHOICE

## 2018-12-05 RX ORDER — OMEPRAZOLE 20 MG/1
20 CAPSULE, DELAYED RELEASE ORAL DAILY
Qty: 30 CAPSULE | Refills: 2 | Status: SHIPPED | OUTPATIENT
Start: 2018-12-05 | End: 2018-12-21 | Stop reason: ALTCHOICE

## 2018-12-05 NOTE — PROGRESS NOTES
Assessment/Plan:       Diagnoses and all orders for this visit:    Persistent cough for 3 weeks or longer  -     XR chest pa & lateral; Future  -     benzonatate (TESSALON) 200 MG capsule; Take 1 capsule (200 mg total) by mouth 3 (three) times a day as needed for cough  -     Ambulatory referral to Gastroenterology; Future    Gastroesophageal reflux disease, esophagitis presence not specified  -     omeprazole (PriLOSEC) 20 mg delayed release capsule; Take 1 capsule (20 mg total) by mouth daily  -     Ambulatory referral to Gastroenterology; Future            Subjective:      Patient ID: Kenya Zamarripa is a 60 y o  female  Cough   This is a recurrent problem  The current episode started more than 1 month ago  The problem has been gradually worsening  The problem occurs every few minutes  The cough is productive of sputum  Associated symptoms include heartburn and wheezing  Pertinent negatives include no chest pain, ear congestion, ear pain, fever, headaches, hemoptysis, nasal congestion, postnasal drip, rash, sore throat or shortness of breath  The symptoms are aggravated by lying down  She has tried oral steroids and prescription cough suppressant for the symptoms  The treatment provided no relief  The following portions of the patient's history were reviewed and updated as appropriate:   She has a past medical history of Hypertension; Impaired fasting glucose; and Pain ,   does not have any pertinent problems on file  ,   has a past surgical history that includes Tubal ligation; Hysterectomy; pr colonoscopy flx dx w/collj spec when pfrmd (N/A, 7/21/2016); Other surgical history; and Oophorectomy (Right)  ,  family history includes COPD in her father and mother; Cancer in her maternal aunt; Heart failure in her mother; Hyperlipidemia in her mother; Hypertension in her mother  ,   reports that she has never smoked   She has never used smokeless tobacco  She reports that she does not drink alcohol or use drugs ,  is allergic to erythromycin; sulfa antibiotics; and tetracycline     Current Outpatient Prescriptions   Medication Sig Dispense Refill    cimetidine (TAGAMET) 400 mg tablet Take 1 tablet (400 mg total) by mouth 2 (two) times a day 60 tablet 3    EDARBI 40 MG tablet Take 40 mg by mouth daily  3    ibuprofen (MOTRIN) 600 mg tablet Take 1 tablet (600 mg total) by mouth every 6 (six) hours as needed for mild pain 90 tablet 0    metoprolol succinate (TOPROL-XL) 100 mg 24 hr tablet Take 1 tablet (100 mg total) by mouth daily 30 tablet 5    METRONIDAZOLE, TOPICAL, 0 75 % LOTN Apply topically      amLODIPine (NORVASC) 5 mg tablet       benzonatate (TESSALON) 200 MG capsule Take 1 capsule (200 mg total) by mouth 3 (three) times a day as needed for cough 30 capsule 1    losartan (COZAAR) 50 mg tablet Take 50 mg by mouth daily   omeprazole (PriLOSEC) 20 mg delayed release capsule Take 1 capsule (20 mg total) by mouth daily 30 capsule 2     No current facility-administered medications for this visit  Review of Systems   Constitutional: Positive for appetite change  Negative for fatigue and fever  HENT: Negative for ear pain, postnasal drip, sore throat and trouble swallowing  Respiratory: Positive for cough and wheezing  Negative for hemoptysis and shortness of breath  Cardiovascular: Negative for chest pain  Gastrointestinal: Positive for heartburn  Negative for abdominal pain  Skin: Negative for rash  Neurological: Negative for headaches  Objective:  Vitals:    12/05/18 0808   BP: 134/82   Pulse: 102   Temp: 98 2 °F (36 8 °C)   SpO2: 94%   Weight: 99 8 kg (220 lb)   Height: 5' 4" (1 626 m)     Body mass index is 37 76 kg/m²  Physical Exam   Constitutional: She is oriented to person, place, and time  She appears well-developed and well-nourished  HENT:   Head: Normocephalic  Neck: Carotid bruit is not present  No thyromegaly present     Cardiovascular: Normal rate, regular rhythm and normal heart sounds  No murmur heard  Pulmonary/Chest: Effort normal and breath sounds normal  She has no wheezes  Abdominal: Soft  Bowel sounds are normal  There is no tenderness  Lymphadenopathy:     She has no cervical adenopathy  Neurological: She is alert and oriented to person, place, and time  Psychiatric: She has a normal mood and affect   Her behavior is normal  Judgment and thought content normal

## 2018-12-21 ENCOUNTER — OFFICE VISIT (OUTPATIENT)
Dept: FAMILY MEDICINE CLINIC | Facility: CLINIC | Age: 58
End: 2018-12-21
Payer: COMMERCIAL

## 2018-12-21 VITALS
HEART RATE: 82 BPM | SYSTOLIC BLOOD PRESSURE: 126 MMHG | TEMPERATURE: 97.7 F | WEIGHT: 220 LBS | BODY MASS INDEX: 37.56 KG/M2 | DIASTOLIC BLOOD PRESSURE: 82 MMHG | HEIGHT: 64 IN | OXYGEN SATURATION: 96 %

## 2018-12-21 DIAGNOSIS — R05.3 PERSISTENT COUGH FOR 3 WEEKS OR LONGER: Primary | ICD-10-CM

## 2018-12-21 DIAGNOSIS — R05.9 COUGH: ICD-10-CM

## 2018-12-21 PROCEDURE — 99213 OFFICE O/P EST LOW 20 MIN: CPT | Performed by: PHYSICIAN ASSISTANT

## 2018-12-21 PROCEDURE — 3008F BODY MASS INDEX DOCD: CPT | Performed by: PHYSICIAN ASSISTANT

## 2018-12-21 PROCEDURE — 1036F TOBACCO NON-USER: CPT | Performed by: PHYSICIAN ASSISTANT

## 2018-12-21 RX ORDER — OMEPRAZOLE 40 MG/1
CAPSULE, DELAYED RELEASE ORAL
Refills: 2 | COMMUNITY
Start: 2018-12-04 | End: 2018-12-28 | Stop reason: SDUPTHER

## 2018-12-21 RX ORDER — LORATADINE 10 MG/1
10 TABLET ORAL DAILY
Qty: 30 TABLET | Refills: 0
Start: 2018-12-21

## 2018-12-21 NOTE — PROGRESS NOTES
Assessment/Plan:       Diagnoses and all orders for this visit:    Persistent cough for 3 weeks or longer  -     loratadine (CLARITIN) 10 mg tablet; Take 1 tablet (10 mg total) by mouth daily  -     CT chest wo contrast; Future  -     XR sinuses < 3 vw; Future    Cough    Other orders  -     omeprazole (PriLOSEC) 40 MG capsule; TAKE 1 CAPSULE BY MOUTH EVERY DAY            Subjective:      Patient ID: Abdirizak Elias is a 60 y o  female  Cough   This is a recurrent problem  The current episode started more than 1 month ago  The problem has been unchanged  The problem occurs every few minutes  The cough is productive of sputum  Associated symptoms include heartburn  Pertinent negatives include no chest pain, chills, ear congestion, ear pain, fever, headaches, hemoptysis, myalgias, nasal congestion, postnasal drip, rash, rhinorrhea, sore throat, shortness of breath, sweats, weight loss or wheezing  Nothing aggravates the symptoms  She has tried body position changes, OTC cough suppressant, prescription cough suppressant and rest for the symptoms  The treatment provided no relief  The following portions of the patient's history were reviewed and updated as appropriate:   She has a past medical history of Hypertension; Impaired fasting glucose; and Pain ,   does not have any pertinent problems on file  ,   has a past surgical history that includes Tubal ligation; pr colonoscopy flx dx w/collj spec when pfrmd (N/A, 7/21/2016); Other surgical history; Oophorectomy (Right); and Hysterectomy (12/23/2006)  ,  family history includes Breast cancer (age of onset: 79) in her paternal grandmother; COPD in her father and mother; Cancer in her maternal aunt; Heart failure in her mother; Hyperlipidemia in her mother; Hypertension in her mother  ,   reports that she has never smoked  She has never used smokeless tobacco  She reports that she does not drink alcohol or use drugs  ,  is allergic to erythromycin; sulfa antibiotics; and tetracycline     Current Outpatient Prescriptions   Medication Sig Dispense Refill    amLODIPine (NORVASC) 5 mg tablet       EDARBI 40 MG tablet Take 40 mg by mouth daily  3    ibuprofen (MOTRIN) 600 mg tablet Take 1 tablet (600 mg total) by mouth every 6 (six) hours as needed for mild pain 90 tablet 0    loratadine (CLARITIN) 10 mg tablet Take 1 tablet (10 mg total) by mouth daily 30 tablet 0    losartan (COZAAR) 50 mg tablet Take 50 mg by mouth daily   metoprolol succinate (TOPROL-XL) 100 mg 24 hr tablet Take 1 tablet (100 mg total) by mouth daily 30 tablet 5    METRONIDAZOLE, TOPICAL, 0 75 % LOTN Apply topically      omeprazole (PriLOSEC) 40 MG capsule TAKE 1 CAPSULE BY MOUTH EVERY DAY  2     No current facility-administered medications for this visit  Review of Systems   Constitutional: Negative for chills, fever and weight loss  HENT: Negative for ear pain, postnasal drip, rhinorrhea and sore throat  Respiratory: Positive for cough  Negative for hemoptysis, chest tightness, shortness of breath and wheezing  Cardiovascular: Negative for chest pain  Gastrointestinal: Positive for heartburn  Musculoskeletal: Negative for myalgias  Skin: Negative for rash  Neurological: Negative for headaches  Objective:  Vitals:    12/21/18 0931   BP: 126/82   Pulse: 82   Temp: 97 7 °F (36 5 °C)   SpO2: 96%   Weight: 99 8 kg (220 lb)   Height: 5' 4" (1 626 m)     Body mass index is 37 76 kg/m²  Physical Exam   Constitutional: She is oriented to person, place, and time  She appears well-developed and well-nourished  HENT:   Head: Normocephalic  Right Ear: External ear normal    Left Ear: External ear normal    Eyes: Conjunctivae are normal    Cardiovascular: Normal rate, regular rhythm and normal heart sounds  Pulmonary/Chest: Effort normal and breath sounds normal  She has no wheezes  She has no rales  Neurological: She is alert and oriented to person, place, and time  Skin: Skin is warm and dry  Psychiatric: She has a normal mood and affect  Her behavior is normal    Nursing note and vitals reviewed

## 2018-12-28 DIAGNOSIS — K21.9 GASTROESOPHAGEAL REFLUX DISEASE, ESOPHAGITIS PRESENCE NOT SPECIFIED: Primary | ICD-10-CM

## 2018-12-28 RX ORDER — OMEPRAZOLE 40 MG/1
40 CAPSULE, DELAYED RELEASE ORAL DAILY
Qty: 30 CAPSULE | Refills: 2 | Status: SHIPPED | OUTPATIENT
Start: 2018-12-28 | End: 2019-01-30 | Stop reason: SDUPTHER

## 2019-01-05 LAB — HBA1C MFR BLD HPLC: 6.1 %

## 2019-01-30 DIAGNOSIS — K21.9 GASTROESOPHAGEAL REFLUX DISEASE, ESOPHAGITIS PRESENCE NOT SPECIFIED: ICD-10-CM

## 2019-01-31 RX ORDER — OMEPRAZOLE 40 MG/1
40 CAPSULE, DELAYED RELEASE ORAL DAILY
Qty: 30 CAPSULE | Refills: 2 | Status: SHIPPED | OUTPATIENT
Start: 2019-01-31 | End: 2019-04-26 | Stop reason: SDUPTHER

## 2019-03-22 DIAGNOSIS — I10 HYPERTENSION, ESSENTIAL: ICD-10-CM

## 2019-03-22 RX ORDER — METOPROLOL SUCCINATE 100 MG/1
100 TABLET, EXTENDED RELEASE ORAL DAILY
Qty: 30 TABLET | Refills: 5 | Status: SHIPPED | OUTPATIENT
Start: 2019-03-22 | End: 2019-09-20 | Stop reason: SDUPTHER

## 2019-04-24 DIAGNOSIS — M25.50 ARTHRALGIA, UNSPECIFIED JOINT: ICD-10-CM

## 2019-04-25 RX ORDER — IBUPROFEN 600 MG/1
600 TABLET ORAL EVERY 6 HOURS PRN
Qty: 90 TABLET | Refills: 0 | Status: SHIPPED | OUTPATIENT
Start: 2019-04-25 | End: 2019-05-16 | Stop reason: SDUPTHER

## 2019-04-26 DIAGNOSIS — K21.9 GASTROESOPHAGEAL REFLUX DISEASE, ESOPHAGITIS PRESENCE NOT SPECIFIED: ICD-10-CM

## 2019-04-26 RX ORDER — OMEPRAZOLE 40 MG/1
40 CAPSULE, DELAYED RELEASE ORAL DAILY
Qty: 30 CAPSULE | Refills: 2 | Status: SHIPPED | OUTPATIENT
Start: 2019-04-26 | End: 2019-07-24 | Stop reason: SDUPTHER

## 2019-05-16 DIAGNOSIS — M25.50 ARTHRALGIA, UNSPECIFIED JOINT: ICD-10-CM

## 2019-05-16 RX ORDER — IBUPROFEN 600 MG/1
600 TABLET ORAL EVERY 6 HOURS PRN
Qty: 90 TABLET | Refills: 0 | Status: SHIPPED | OUTPATIENT
Start: 2019-05-16 | End: 2019-07-19 | Stop reason: SDUPTHER

## 2019-05-24 ENCOUNTER — OFFICE VISIT (OUTPATIENT)
Dept: FAMILY MEDICINE CLINIC | Facility: CLINIC | Age: 59
End: 2019-05-24
Payer: COMMERCIAL

## 2019-05-24 VITALS
OXYGEN SATURATION: 96 % | SYSTOLIC BLOOD PRESSURE: 112 MMHG | BODY MASS INDEX: 37.73 KG/M2 | WEIGHT: 221 LBS | HEART RATE: 99 BPM | HEIGHT: 64 IN | DIASTOLIC BLOOD PRESSURE: 80 MMHG

## 2019-05-24 DIAGNOSIS — K21.00 GASTROESOPHAGEAL REFLUX DISEASE WITH ESOPHAGITIS: ICD-10-CM

## 2019-05-24 DIAGNOSIS — E66.01 MORBID OBESITY (HCC): ICD-10-CM

## 2019-05-24 DIAGNOSIS — R05.3 PERSISTENT COUGH FOR 3 WEEKS OR LONGER: ICD-10-CM

## 2019-05-24 DIAGNOSIS — I10 ESSENTIAL HYPERTENSION: Primary | ICD-10-CM

## 2019-05-24 DIAGNOSIS — R73.01 IMPAIRED FASTING GLUCOSE: ICD-10-CM

## 2019-05-24 DIAGNOSIS — L71.9 ROSACEA: ICD-10-CM

## 2019-05-24 PROCEDURE — 3074F SYST BP LT 130 MM HG: CPT | Performed by: PHYSICIAN ASSISTANT

## 2019-05-24 PROCEDURE — 3008F BODY MASS INDEX DOCD: CPT | Performed by: PHYSICIAN ASSISTANT

## 2019-05-24 PROCEDURE — 1036F TOBACCO NON-USER: CPT | Performed by: PHYSICIAN ASSISTANT

## 2019-05-24 PROCEDURE — 99214 OFFICE O/P EST MOD 30 MIN: CPT | Performed by: PHYSICIAN ASSISTANT

## 2019-05-24 PROCEDURE — 3079F DIAST BP 80-89 MM HG: CPT | Performed by: PHYSICIAN ASSISTANT

## 2019-05-24 RX ORDER — METRONIDAZOLE 7.5 MG/G
LOTION TOPICAL 2 TIMES DAILY
Qty: 59 ML | Refills: 5 | Status: SHIPPED | OUTPATIENT
Start: 2019-05-24 | End: 2020-12-08 | Stop reason: SDUPTHER

## 2019-05-24 RX ORDER — OMEPRAZOLE 40 MG/1
40 CAPSULE, DELAYED RELEASE ORAL DAILY
COMMUNITY
Start: 2018-04-30 | End: 2019-05-24 | Stop reason: SDUPTHER

## 2019-07-03 ENCOUNTER — TELEPHONE (OUTPATIENT)
Dept: GASTROENTEROLOGY | Facility: CLINIC | Age: 59
End: 2019-07-03

## 2019-07-19 DIAGNOSIS — M25.50 ARTHRALGIA, UNSPECIFIED JOINT: ICD-10-CM

## 2019-07-19 RX ORDER — IBUPROFEN 600 MG/1
600 TABLET ORAL EVERY 6 HOURS PRN
Qty: 90 TABLET | Refills: 0 | Status: SHIPPED | OUTPATIENT
Start: 2019-07-19 | End: 2019-12-11 | Stop reason: SDUPTHER

## 2019-07-24 DIAGNOSIS — K21.9 GASTROESOPHAGEAL REFLUX DISEASE, ESOPHAGITIS PRESENCE NOT SPECIFIED: ICD-10-CM

## 2019-07-24 RX ORDER — OMEPRAZOLE 40 MG/1
CAPSULE, DELAYED RELEASE ORAL
Qty: 90 CAPSULE | Refills: 0 | Status: SHIPPED | OUTPATIENT
Start: 2019-07-24 | End: 2019-10-22 | Stop reason: SDUPTHER

## 2019-09-06 ENCOUNTER — IMMUNIZATIONS (OUTPATIENT)
Dept: FAMILY MEDICINE CLINIC | Facility: CLINIC | Age: 59
End: 2019-09-06
Payer: COMMERCIAL

## 2019-09-06 DIAGNOSIS — Z23 ENCOUNTER FOR IMMUNIZATION: ICD-10-CM

## 2019-09-06 PROCEDURE — 90682 RIV4 VACC RECOMBINANT DNA IM: CPT | Performed by: FAMILY MEDICINE

## 2019-09-06 PROCEDURE — 90471 IMMUNIZATION ADMIN: CPT | Performed by: FAMILY MEDICINE

## 2019-09-20 DIAGNOSIS — I10 HYPERTENSION, ESSENTIAL: ICD-10-CM

## 2019-09-24 RX ORDER — METOPROLOL SUCCINATE 100 MG/1
TABLET, EXTENDED RELEASE ORAL
Qty: 90 TABLET | Refills: 1 | Status: SHIPPED | OUTPATIENT
Start: 2019-09-24 | End: 2020-03-18

## 2019-10-22 DIAGNOSIS — K21.9 GASTROESOPHAGEAL REFLUX DISEASE, ESOPHAGITIS PRESENCE NOT SPECIFIED: ICD-10-CM

## 2019-10-22 RX ORDER — OMEPRAZOLE 40 MG/1
CAPSULE, DELAYED RELEASE ORAL
Qty: 90 CAPSULE | Refills: 0 | Status: SHIPPED | OUTPATIENT
Start: 2019-10-22 | End: 2020-01-17

## 2019-12-11 DIAGNOSIS — M25.50 ARTHRALGIA, UNSPECIFIED JOINT: ICD-10-CM

## 2019-12-11 RX ORDER — IBUPROFEN 600 MG/1
600 TABLET ORAL EVERY 6 HOURS PRN
Qty: 90 TABLET | Refills: 0 | Status: SHIPPED | OUTPATIENT
Start: 2019-12-11 | End: 2020-05-04

## 2019-12-26 ENCOUNTER — OFFICE VISIT (OUTPATIENT)
Dept: FAMILY MEDICINE CLINIC | Facility: CLINIC | Age: 59
End: 2019-12-26
Payer: COMMERCIAL

## 2019-12-26 VITALS
TEMPERATURE: 99.2 F | HEART RATE: 88 BPM | BODY MASS INDEX: 37.56 KG/M2 | WEIGHT: 220 LBS | SYSTOLIC BLOOD PRESSURE: 122 MMHG | HEIGHT: 64 IN | RESPIRATION RATE: 18 BRPM | DIASTOLIC BLOOD PRESSURE: 78 MMHG | OXYGEN SATURATION: 96 %

## 2019-12-26 DIAGNOSIS — J06.9 UPPER RESPIRATORY TRACT INFECTION, UNSPECIFIED TYPE: Primary | ICD-10-CM

## 2019-12-26 PROCEDURE — 3008F BODY MASS INDEX DOCD: CPT | Performed by: NURSE PRACTITIONER

## 2019-12-26 PROCEDURE — 1036F TOBACCO NON-USER: CPT | Performed by: NURSE PRACTITIONER

## 2019-12-26 PROCEDURE — 99213 OFFICE O/P EST LOW 20 MIN: CPT | Performed by: NURSE PRACTITIONER

## 2019-12-26 RX ORDER — AMOXICILLIN 875 MG/1
875 TABLET, COATED ORAL 2 TIMES DAILY
Qty: 14 TABLET | Refills: 0 | Status: SHIPPED | OUTPATIENT
Start: 2019-12-26 | End: 2020-01-02

## 2019-12-26 NOTE — PATIENT INSTRUCTIONS
Amoxicillin twice a day for 7 days  Increase oral hydration, Tea with honey  Vaporize the air  Tylenol for pain or fever  Increase  Rest    Upper Respiratory Infection   WHAT YOU NEED TO KNOW:   An upper respiratory infection is also called the common cold  It is an infection that can affect your nose, throat, ears, and sinuses  For healthy people, the common cold is usually not serious and does not need special treatment  Cold symptoms are usually worst for the first 3 to 5 days  Most people get better in 7 to 14 days  You may continue to cough for 2 to 3 weeks  Colds are caused by viruses and do not get better with antibiotics  DISCHARGE INSTRUCTIONS:   Return to the emergency department if:   · You have chest pain or trouble breathing  Contact your healthcare provider if:   · You have a fever over 102ºF (39°C)  · Your sore throat gets worse or you see white or yellow spots in your throat  · Your symptoms get worse after 3 to 5 days or your cold is not better in 14 days  · You have a rash anywhere on your skin  · You have large, tender lumps in your neck  · You have thick, green or yellow drainage from your nose  · You cough up thick yellow, green, or bloody mucus  · You have vomiting for more than 24 hours and cannot keep fluids down  · You have a bad earache  · You have questions or concerns about your condition or care  Medicines: You may need any of the following:  · Decongestants  help reduce nasal congestion and help you breathe more easily  If you take decongestant pills, they may make you feel restless or cause problems with your sleep  Do not use decongestant sprays for more than a few days  · Cough suppressants  help reduce coughing  Ask your healthcare provider which type of cough medicine is best for you  · NSAIDs , such as ibuprofen, help decrease swelling, pain, and fever  NSAIDs can cause stomach bleeding or kidney problems in certain people   If you take blood thinner medicine, always ask your healthcare provider if NSAIDs are safe for you  Always read the medicine label and follow directions  · Acetaminophen  decreases pain and fever  It is available without a doctor's order  Ask how much to take and how often to take it  Follow directions  Read the labels of all other medicines you are using to see if they also contain acetaminophen, or ask your doctor or pharmacist  Acetaminophen can cause liver damage if not taken correctly  Do not use more than 4 grams (4,000 milligrams) total of acetaminophen in one day  · Take your medicine as directed  Contact your healthcare provider if you think your medicine is not helping or if you have side effects  Tell him or her if you are allergic to any medicine  Keep a list of the medicines, vitamins, and herbs you take  Include the amounts, and when and why you take them  Bring the list or the pill bottles to follow-up visits  Carry your medicine list with you in case of an emergency  Follow up with your healthcare provider as directed:  Write down your questions so you remember to ask them during your visits  Self-care:   · Rest as much as possible  Slowly start to do more each day  · Drink more liquids as directed  Liquids will help thin and loosen mucus so you can cough it up  Liquids will also help prevent dehydration  Liquids that help prevent dehydration include water, fruit juice, and broth  Do not drink liquids that contain caffeine  Caffeine can increase your risk for dehydration  Ask your healthcare provider how much liquid to drink each day  · Soothe a sore throat  Gargle with warm salt water  This helps your sore throat feel better  Make salt water by dissolving ¼ teaspoon salt in 1 cup warm water  You may also suck on hard candy or throat lozenges  You may use a sore throat spray  · Use a humidifier or vaporizer    Use a cool mist humidifier or a vaporizer to increase air moisture in your home  This may make it easier for you to breathe and help decrease your cough  · Use saline nasal drops as directed  These help relieve congestion  · Apply petroleum-based jelly around the outside of your nostrils  This can decrease irritation from blowing your nose  · Do not smoke  Nicotine and other chemicals in cigarettes and cigars can make your symptoms worse  They can also cause infections such as bronchitis or pneumonia  Ask your healthcare provider for information if you currently smoke and need help to quit  E-cigarettes or smokeless tobacco still contain nicotine  Talk to your healthcare provider before you use these products  Prevent spreading your cold to others:   · Try to stay away from other people during the first 2 to 3 days of your cold when it is more easily spread  · Do not share food or drinks  · Do not share hand towels with household members  · Wash your hands often, especially after you blow your nose  Turn away from other people and cover your mouth and nose with a tissue when you sneeze or cough  © 2017 [facility] [facility] Information is for End User's use only and may not be sold, redistributed or otherwise used for commercial purposes  All illustrations and images included in CareNotes® are the copyrighted property of [employer]  or [employer]  The above information is an  only  It is not intended as medical advice for individual conditions or treatments  Talk to your doctor, nurse or pharmacist before following any medical regimen to see if it is safe and effective for you

## 2019-12-26 NOTE — ASSESSMENT & PLAN NOTE
Amoxicillin twice a day for 7 days  Increase rest and hydration  May use Tylenol for fever  Encouraged to moisturize air  Encouraged to drink tea with honey

## 2019-12-26 NOTE — PROGRESS NOTES
Assessment/Plan:     Upper respiratory tract infection    Amoxicillin twice a day for 7 days  Increase rest and hydration  May use Tylenol for fever  Encouraged to moisturize air  Encouraged to drink tea with honey  Problem List Items Addressed This Visit        Respiratory    Upper respiratory tract infection - Primary       Amoxicillin twice a day for 7 days  Increase rest and hydration  May use Tylenol for fever  Encouraged to moisturize air  Encouraged to drink tea with honey  Relevant Medications    amoxicillin (AMOXIL) 875 mg tablet            Subjective:      Patient ID: Marie Whyte is a 60 y o  female  Patient is here with complaints of sore throat, fever, congestion for a week  Reports her coworkers have been ill  Patient has  Not been taking any medications  The following portions of the patient's history were reviewed and updated as appropriate: allergies, current medications, past family history, past medical history, past social history, past surgical history and problem list     Review of Systems   Constitutional: Positive for chills, fatigue and fever  HENT: Positive for congestion and sore throat  Negative for trouble swallowing  Eyes: Negative  Respiratory: Negative  Cardiovascular: Negative  Gastrointestinal: Negative  Endocrine: Negative  Genitourinary: Negative  Musculoskeletal: Negative  Allergic/Immunologic: Negative  Neurological: Negative  Psychiatric/Behavioral: Negative  Objective:      /78   Pulse 88   Temp 99 2 °F (37 3 °C) (Tympanic)   Resp 18   Ht 5' 4" (1 626 m)   Wt 99 8 kg (220 lb)   SpO2 96%   BMI 37 76 kg/m²          Physical Exam   Constitutional: She is oriented to person, place, and time  She appears well-developed and well-nourished  HENT:   Head: Normocephalic and atraumatic  Right Ear: External ear normal    Left Ear: External ear normal    Nose: No rhinorrhea   Right sinus exhibits frontal sinus tenderness  Left sinus exhibits frontal sinus tenderness  Mouth/Throat: Tonsils are 0 on the right  No tonsillar exudate  Eyes: Pupils are equal, round, and reactive to light  Neck: Normal range of motion  Cardiovascular: Normal rate and regular rhythm  Pulmonary/Chest: Effort normal    Abdominal: Soft  Bowel sounds are normal    Musculoskeletal: Normal range of motion  Neurological: She is alert and oriented to person, place, and time  Skin: Skin is warm and dry  Psychiatric: She has a normal mood and affect  Nursing note and vitals reviewed          Labs:    Lab Results   Component Value Date    WBC 8 03 10/19/2017    HGB 14 4 10/19/2017    HCT 43 3 10/19/2017    MCV 84 10/19/2017     10/19/2017     Lab Results   Component Value Date     10/26/2015    K 4 2 10/19/2017     10/19/2017    CO2 28 10/19/2017    ANIONGAP 8 10/26/2015    BUN 18 10/19/2017    CREATININE 0 96 10/19/2017    GLUCOSE 90 10/26/2015    GLUF 81 10/19/2017    CALCIUM 9 2 10/19/2017    AST 21 10/19/2017    ALT 33 10/19/2017    ALKPHOS 108 10/19/2017    PROT 7 5 10/26/2015    BILITOT 0 42 10/26/2015    EGFR 65 10/19/2017     Lab Results   Component Value Date    GLUCOSE 90 10/26/2015    CALCIUM 9 2 10/19/2017     10/26/2015    K 4 2 10/19/2017    CO2 28 10/19/2017     10/19/2017    BUN 18 10/19/2017    CREATININE 0 96 10/19/2017

## 2020-01-17 DIAGNOSIS — K21.9 GASTROESOPHAGEAL REFLUX DISEASE, ESOPHAGITIS PRESENCE NOT SPECIFIED: ICD-10-CM

## 2020-01-17 RX ORDER — OMEPRAZOLE 40 MG/1
CAPSULE, DELAYED RELEASE ORAL
Qty: 90 CAPSULE | Refills: 0 | Status: SHIPPED | OUTPATIENT
Start: 2020-01-17 | End: 2020-04-20

## 2020-02-21 ENCOUNTER — OFFICE VISIT (OUTPATIENT)
Dept: FAMILY MEDICINE CLINIC | Facility: CLINIC | Age: 60
End: 2020-02-21
Payer: COMMERCIAL

## 2020-02-21 VITALS
BODY MASS INDEX: 37.56 KG/M2 | HEART RATE: 75 BPM | SYSTOLIC BLOOD PRESSURE: 108 MMHG | OXYGEN SATURATION: 96 % | TEMPERATURE: 98.4 F | DIASTOLIC BLOOD PRESSURE: 80 MMHG | WEIGHT: 220 LBS | HEIGHT: 64 IN

## 2020-02-21 DIAGNOSIS — Z00.00 ANNUAL PHYSICAL EXAM: Primary | ICD-10-CM

## 2020-02-21 DIAGNOSIS — Z13.1 SCREENING FOR DIABETES MELLITUS: ICD-10-CM

## 2020-02-21 DIAGNOSIS — R73.01 IMPAIRED FASTING GLUCOSE: ICD-10-CM

## 2020-02-21 DIAGNOSIS — Z13.6 SCREENING FOR CARDIOVASCULAR CONDITION: ICD-10-CM

## 2020-02-21 DIAGNOSIS — M25.562 CHRONIC PAIN OF BOTH KNEES: ICD-10-CM

## 2020-02-21 DIAGNOSIS — Z12.31 ENCOUNTER FOR SCREENING MAMMOGRAM FOR BREAST CANCER: ICD-10-CM

## 2020-02-21 DIAGNOSIS — M25.561 CHRONIC PAIN OF BOTH KNEES: ICD-10-CM

## 2020-02-21 DIAGNOSIS — G89.29 CHRONIC PAIN OF BOTH KNEES: ICD-10-CM

## 2020-02-21 DIAGNOSIS — I10 ESSENTIAL HYPERTENSION: ICD-10-CM

## 2020-02-21 PROBLEM — J06.9 UPPER RESPIRATORY TRACT INFECTION: Status: RESOLVED | Noted: 2019-12-26 | Resolved: 2020-02-21

## 2020-02-21 PROBLEM — R05.3 PERSISTENT COUGH FOR 3 WEEKS OR LONGER: Status: RESOLVED | Noted: 2018-12-05 | Resolved: 2020-02-21

## 2020-02-21 PROCEDURE — 99396 PREV VISIT EST AGE 40-64: CPT | Performed by: PHYSICIAN ASSISTANT

## 2020-02-21 NOTE — PROGRESS NOTES
[address]     NAME: Uzma Vuangie  AGE: 60 y o  SEX: female  : 1960     DATE: 2020     Assessment and Plan:     Problem List Items Addressed This Visit        Endocrine    Impaired fasting glucose    Relevant Orders    Hemoglobin A1C       Cardiovascular and Mediastinum    Essential hypertension    Relevant Orders    Lipid panel    Comprehensive metabolic panel    CBC and Platelet    TSH, 3rd generation with Free T4 reflex       Other    Annual physical exam - Primary    Chronic pain of both knees    Relevant Orders    XR knee 3 vw left non injury    XR knee 3 vw right non injury      Other Visit Diagnoses     Encounter for screening mammogram for breast cancer        Relevant Orders    Mammo screening bilateral w 3d & cad    Screening for diabetes mellitus        Screening for cardiovascular condition        Relevant Orders    Lipid panel          Immunizations and preventive care screenings were discussed with patient today  Appropriate education was printed on patient's after visit summary  Counseling:  Dental Health: discussed importance of regular tooth brushing, flossing, and dental visits  Injury prevention: discussed safety/seat belts, safety helmets, smoke detectors, carbon dioxide detectors, and smoking near bedding or upholstery  · Exercise: the importance of regular exercise/physical activity was discussed  Recommend exercise 3-5 times per week for at least 30 minutes  BMI Counseling: Body mass index is 37 76 kg/m²  The BMI is above normal  Nutrition recommendations include decreasing portion sizes, encouraging healthy choices of fruits and vegetables, consuming healthier snacks, limiting drinks that contain sugar, moderation in carbohydrate intake, increasing intake of lean protein, reducing intake of saturated and trans fat and reducing intake of cholesterol   Exercise recommendations include exercising 3-5 times per week and strength training exercises  No pharmacotherapy was ordered  Return in about 6 months (around 8/21/2020) for Recheck  Chief Complaint:     Chief Complaint   Patient presents with    Physical Exam     annual    Knee Pain     bilateral knees with mild pain    order     freya is requesting referral for mammogram      History of Present Illness:     Adult Annual Physical   Patient here for a comprehensive physical exam  The patient reports knee pain is worsening  Diet and Physical Activity  · Diet/Nutrition: poor diet and limited junk food  · Exercise: no formal exercise  Depression Screening  PHQ-9 Depression Screening    PHQ-9:    Frequency of the following problems over the past two weeks:       Little interest or pleasure in doing things:  0 - not at all  Feeling down, depressed, or hopeless:  0 - not at all  PHQ-2 Score:  0       General Health  · Sleep: sleeps well  · Hearing: normal - bilateral   · Vision: goes for regular eye exams and wears glasses  · Dental: regular dental visits  /GYN Health  · Patient is: postmenopausal  · Last menstrual period: NA  · Contraceptive method: NA      Review of Systems:     Review of Systems   Constitutional: Negative for appetite change, fatigue, fever and unexpected weight change  HENT: Negative for dental problem, ear pain, hearing loss, mouth sores, nosebleeds, rhinorrhea, tinnitus, trouble swallowing and voice change  Eyes: Negative for photophobia, pain, discharge and visual disturbance  Respiratory: Negative for cough, chest tightness, shortness of breath and wheezing  Cardiovascular: Negative for chest pain and palpitations  Gastrointestinal: Negative for abdominal pain, blood in stool, constipation, diarrhea, nausea, rectal pain and vomiting  Endocrine: Negative for cold intolerance, polydipsia, polyphagia and polyuria     Genitourinary: Negative for decreased urine volume, difficulty urinating, dysuria, enuresis, frequency, genital sores, hematuria and urgency  Musculoskeletal: Positive for arthralgias and back pain  Negative for gait problem, joint swelling, myalgias, neck pain and neck stiffness  Skin: Negative for color change and rash  Allergic/Immunologic: Negative for environmental allergies, food allergies and immunocompromised state  Neurological: Negative for dizziness, seizures, speech difficulty, light-headedness and headaches  Hematological: Negative for adenopathy  Does not bruise/bleed easily  Psychiatric/Behavioral: Negative for behavioral problems, confusion, decreased concentration, self-injury and sleep disturbance  The patient is not nervous/anxious and is not hyperactive  Past Medical History:     Past Medical History:   Diagnosis Date    Hypertension     Impaired fasting glucose     Pain       Past Surgical History:     Past Surgical History:   Procedure Laterality Date    HYSTERECTOMY  12/23/2006    OOPHORECTOMY Right     unilateral    OTHER SURGICAL HISTORY      Excision of lesion Genitalia Bengin last assessed 11/01/16    SD COLONOSCOPY FLX DX W/COLLJ SPEC WHEN PFRMD N/A 7/21/2016    Procedure: COLONOSCOPY;  Surgeon: Charlette Dominguez MD;  Location: [facility];   Service: Gastroenterology    TUBAL LIGATION        Social History:        Social History     Socioeconomic History    Marital status: Single     Spouse name: Not on file    Number of children: Not on file    Years of education: Not on file    Highest education level: Not on file   Occupational History    Occupation: Neuro ass    Social Needs    Financial resource strain: Not on file    Food insecurity:     Worry: Not on file     Inability: Not on file    Transportation needs:     Medical: Not on file     Non-medical: Not on file   Tobacco Use    Smoking status: Never Smoker    Smokeless tobacco: Never Used    Tobacco comment: Former Substance and Sexual Activity    Alcohol use: No    Drug use: No    Sexual activity: Not Currently   Lifestyle    Physical activity:     Days per week: Not on file     Minutes per session: Not on file    Stress: Not on file   Relationships    Social connections:     Talks on phone: Not on file     Gets together: Not on file     Attends Gnosticism service: Not on file     Active member of club or organization: Not on file     Attends meetings of clubs or organizations: Not on file     Relationship status: Not on file    Intimate partner violence:     Fear of current or ex partner: Not on file     Emotionally abused: Not on file     Physically abused: Not on file     Forced sexual activity: Not on file   Other Topics Concern    Not on file   Social History Narrative        No caffeine use    Seeing a dentist      Family History:     Family History   Problem Relation Age of Onset    COPD Mother     Heart failure Mother     Hyperlipidemia Mother     Hypertension Mother     COPD Father         moderate    Cancer Maternal Aunt         Urinary bladder    Breast cancer Paternal Grandmother 79      Current Medications:     Current Outpatient Medications   Medication Sig Dispense Refill    EDARBI 40 MG tablet Take 40 mg by mouth daily  3    ibuprofen (MOTRIN) 600 mg tablet Take 1 tablet (600 mg total) by mouth every 6 (six) hours as needed for mild pain 90 tablet 0    loratadine (CLARITIN) 10 mg tablet Take 1 tablet (10 mg total) by mouth daily 30 tablet 0    metoprolol succinate (TOPROL-XL) 100 mg 24 hr tablet TAKE 1 TABLET BY MOUTH EVERY DAY 90 tablet 1    METRONIDAZOLE, TOPICAL, 0 75 % LOTN Apply topically 2 (two) times a day 59 mL 5    omeprazole (PriLOSEC) 40 MG capsule TAKE 1 CAPSULE BY MOUTH EVERY DAY 90 capsule 0     No current facility-administered medications for this visit  Allergies:      Allergies   Allergen Reactions    Erythromycin     Sulfa Antibiotics     Tetracycline Physical Exam:     /80   Pulse 75   Temp 98 4 °F (36 9 °C)   Ht 5' 4" (1 626 m)   Wt 99 8 kg (220 lb)   SpO2 96%   BMI 37 76 kg/m²     Physical Exam   Constitutional: She is oriented to person, place, and time  She appears well-developed and well-nourished  HENT:   Head: Normocephalic  Right Ear: Hearing, tympanic membrane, external ear and ear canal normal    Left Ear: Hearing, tympanic membrane, external ear and ear canal normal    Mouth/Throat: Uvula is midline, oropharynx is clear and moist and mucous membranes are normal    Eyes: Pupils are equal, round, and reactive to light  Conjunctivae and EOM are normal    Neck: Normal range of motion  Carotid bruit is not present  No thyromegaly present  Cardiovascular: Normal rate, regular rhythm, normal heart sounds and intact distal pulses  Pulmonary/Chest: Effort normal and breath sounds normal    Abdominal: Soft  Bowel sounds are normal  She exhibits no mass  There is no tenderness  There is no CVA tenderness  Musculoskeletal: Normal range of motion  She exhibits no edema  Right knee: She exhibits normal range of motion, no swelling, no effusion and no erythema  Tenderness found  Lateral joint line tenderness noted  Left knee: She exhibits normal range of motion, no swelling, no effusion, no deformity and no erythema  Tenderness found  Lateral joint line tenderness noted  Lymphadenopathy:     She has no cervical adenopathy  Neurological: She is alert and oriented to person, place, and time  She has normal reflexes  She exhibits normal muscle tone  Coordination normal    Skin: Skin is dry  Psychiatric: She has a normal mood and affect  Her behavior is normal  Judgment and thought content normal    Nursing note and vitals reviewed        [facility], [id number]

## 2020-02-21 NOTE — PATIENT INSTRUCTIONS
Wellness Visit for Adults   AMBULATORY CARE:   A wellness visit  is when you see your healthcare provider to get screened for health problems  You can also get advice on how to stay healthy  Write down your questions so you remember to ask them  Ask your healthcare provider how often you should have a wellness visit  What happens at a wellness visit:  Your healthcare provider will ask about your health, and your family history of health problems  This includes high blood pressure, heart disease, and cancer  He or she will ask if you have symptoms that concern you, if you smoke, and about your mood  You may also be asked about your intake of medicines, supplements, food, and alcohol  Any of the following may be done:  · Your weight  will be checked  Your height may also be checked so your body mass index (BMI) can be calculated  Your BMI shows if you are at a healthy weight  · Your blood pressure  and heart rate will be checked  Your temperature may also be checked  · Blood and urine tests  may be done  Blood tests may be done to check your cholesterol levels  Abnormal cholesterol levels increase your risk for heart disease and stroke  You may also need a blood or urine test to check for diabetes if you are at increased risk  Urine tests may be done to look for signs of an infection or kidney disease  · A physical exam  includes checking your heartbeat and lungs with a stethoscope  Your healthcare provider may also check your skin to look for sun damage  · Screening tests  may be recommended  A screening test is done to check for diseases that may not cause symptoms  The screening tests you may need depend on your age, gender, family history, and lifestyle habits  For example, colorectal screening may be recommended if you are 48years old or older  Screening tests you need if you are a woman:   · A Pap smear  is used to screen for cervical cancer   Pap smears are usually done every 3 to 5 years depending on your age  You may need them more often if you have had abnormal Pap smear test results in the past  Ask your healthcare provider how often you should have a Pap smear  · A mammogram  is an x-ray of your breasts to screen for breast cancer  Experts recommend mammograms every 2 years starting at age 48 years  You may need a mammogram at age 52 years or younger if you have an increased risk for breast cancer  Talk to your healthcare provider about when you should start having mammograms and how often you need them  Vaccines you may need:   · Get an influenza vaccine  every year  The influenza vaccine protects you from the flu  Several types of viruses cause the flu  The viruses change over time, so new vaccines are made each year  · Get a tetanus-diphtheria (Td) booster vaccine  every 10 years  This vaccine protects you against tetanus and diphtheria  Tetanus is a severe infection that may cause painful muscle spasms and lockjaw  Diphtheria is a severe bacterial infection that causes a thick covering in the back of your mouth and throat  · Get a human papillomavirus (HPV) vaccine  if you are female and aged 23 to 32 or male 23 to 24 and never received it  This vaccine protects you from HPV infection  HPV is the most common infection spread by sexual contact  HPV may also cause vaginal, penile, and anal cancers  · Get a pneumococcal vaccine  if you are aged 72 years or older  The pneumococcal vaccine is an injection given to protect you from pneumococcal disease  Pneumococcal disease is an infection caused by pneumococcal bacteria  The infection may cause pneumonia, meningitis, or an ear infection  · Get a shingles vaccine  if you are aged 61 or older, even if you have had shingles before  The shingles vaccine is an injection to protect you from the varicella-zoster virus  This is the same virus that causes chickenpox   Shingles is a painful rash that develops in people who had chickenpox or have been exposed to the virus  How to eat healthy:  My Plate is a model for planning healthy meals  It shows the types and amounts of foods that should go on your plate  Fruits and vegetables make up about half of your plate, and grains and protein make up the other half  A serving of dairy is included on the side of your plate  The amount of calories and serving sizes you need depends on your age, gender, weight, and height  Examples of healthy foods are listed below:  · Eat a variety of vegetables  such as dark green, red, and orange vegetables  You can also include canned vegetables low in sodium (salt) and frozen vegetables without added butter or sauces  · Eat a variety of fresh fruits , canned fruit in 100% juice, frozen fruit, and dried fruit  · Include whole grains  At least half of the grains you eat should be whole grains  Examples include whole-wheat bread, wheat pasta, brown rice, and whole-grain cereals such as oatmeal     · Eat a variety of protein foods such as seafood (fish and shellfish), lean meat, and poultry without skin (turkey and chicken)  Examples of lean meats include pork leg, shoulder, or tenderloin, and beef round, sirloin, tenderloin, and extra lean ground beef  Other protein foods include eggs and egg substitutes, beans, peas, soy products, nuts, and seeds  · Choose low-fat dairy products such as skim or 1% milk or low-fat yogurt, cheese, and cottage cheese  · Limit unhealthy fats  such as butter, hard margarine, and shortening  Exercise:  Exercise at least 30 minutes per day on most days of the week  Some examples of exercise include walking, biking, dancing, and swimming  You can also fit in more physical activity by taking the stairs instead of the elevator or parking farther away from stores  Include muscle strengthening activities 2 days each week  Regular exercise provides many health benefits   It helps you manage your weight, and decreases your risk for type 2 diabetes, heart disease, stroke, and high blood pressure  Exercise can also help improve your mood  Ask your healthcare provider about the best exercise plan for you  General health and safety guidelines:   · Do not smoke  Nicotine and other chemicals in cigarettes and cigars can cause lung damage  Ask your healthcare provider for information if you currently smoke and need help to quit  E-cigarettes or smokeless tobacco still contain nicotine  Talk to your healthcare provider before you use these products  · Limit alcohol  A drink of alcohol is 12 ounces of beer, 5 ounces of wine, or 1½ ounces of liquor  · Lose weight, if needed  Being overweight increases your risk of certain health conditions  These include heart disease, high blood pressure, type 2 diabetes, and certain types of cancer  · Protect your skin  Do not sunbathe or use tanning beds  Use sunscreen with a SPF 15 or higher  Apply sunscreen at least 15 minutes before you go outside  Reapply sunscreen every 2 hours  Wear protective clothing, hats, and sunglasses when you are outside  · Drive safely  Always wear your seatbelt  Make sure everyone in your car wears a seatbelt  A seatbelt can save your life if you are in an accident  Do not use your cell phone when you are driving  This could distract you and cause an accident  Pull over if you need to make a call or send a text message  · Practice safe sex  Use latex condoms if are sexually active and have more than one partner  Your healthcare provider may recommend screening tests for sexually transmitted infections (STIs)  · Wear helmets, lifejackets, and protective gear  Always wear a helmet when you ride a bike or motorcycle, go skiing, or play sports that could cause a head injury  Wear protective equipment when you play sports  Wear a lifejacket when you are on a boat or doing water sports    © 2017 [employer] Information is for End User's use only and may not be sold, redistributed or otherwise used for commercial purposes  All illustrations and images included in CareNotes® are the copyrighted property of [employer]  or [employer]  The above information is an  only  It is not intended as medical advice for individual conditions or treatments  Talk to your doctor, nurse or pharmacist before following any medical regimen to see if it is safe and effective for you  Cholesterol and Your Health   AMBULATORY CARE:   Cholesterol  is a waxy, fat-like substance  Cholesterol is made by your body, but also comes from certain foods you eat  Your body uses cholesterol to make hormones and new cells  Your body also uses cholesterol to protect nerves  Cholesterol comes from foods such as meat and dairy products  Your total cholesterol level is made up by LDL cholesterol, HDL cholesterol, and triglycerides:  · LDL cholesterol  is called bad cholesterol  because it forms plaque in your arteries  As plaque builds up, your arteries become narrow, and less blood flows through  When plaque decreases blood flow to your heart, you may have chest pain  If plaque completely blocks an artery that bring blood to your heart, you may have a heart attack  Plaque can break off and form blood clots  Blood clots may block arteries in your brain and cause a stroke  · HDL cholesterol  is called good cholesterol  because it helps remove LDL cholesterol from your arteries  It does this by attaching to LDL cholesterol and carrying it to your liver  Your liver breaks down LDL cholesterol so your body can get rid of it  High levels of HDL cholesterol can help prevent a heart attack and stroke  Low levels of HDL cholesterol can increase your risk for heart disease, heart attack, and stroke  · Triglycerides  are a type of fat that store energy from foods you eat  High levels of triglycerides also cause plaque buildup   This can increase your risk for a heart attack or stroke  If your triglyceride level is high, your LDL cholesterol level may also be high  How food affects your cholesterol levels:   · Unhealthy fats  increase LDL cholesterol and triglyceride levels in your blood  They are found in foods high in cholesterol, saturated fat, and trans fat:     ¨ Cholesterol  is found in eggs, dairy, and meat  ¨ Saturated fat  is found in butter, cheese, ice cream, whole milk, and coconut oil  Saturated fat is also found in meat, such as sausage, hot dogs, and bologna  ¨ Trans fat  is found in liquid oils and is used in fried and baked foods  Foods that contain trans fats include chips, crackers, muffins, sweet rolls, microwave popcorn, and cookies  · Healthy fats,  also called unsaturated fats, help lower LDL cholesterol and triglyceride levels  Healthy fats include the following:     ¨ Monounsaturated fats  are found in foods such as olive oil, canola oil, avocado, nuts, and olives  ¨ Polyunsaturated fats,  such as omega 3 fats, are found in fish, such as salmon, trout, and tuna  They can also be found in plant foods such as flaxseed, walnuts, and soybeans  Other things that affect your cholesterol levels:   · Smoking cigarettes    · Being overweight or obese     · Drinking large amounts of alcohol    · Not enough exercise or no exercise    · Certain genes passed from your parents to you  What you need to know about having your cholesterol levels checked: Adults 21to 39years of age should have their cholesterol levels checked every 4 to 6 years  Adults 45 years and older should have their cholesterol checked every 1 to 2 years  You may need your cholesterol checked more often, or at a younger age, if you have risk factors for heart disease  You may also need to have your cholesterol checked more often if you have other health conditions, such as diabetes  Blood tests are used to check cholesterol levels   Blood tests measure your levels of triglycerides, LDL cholesterol, and HDL cholesterol  Cholesterol level goals: Your cholesterol level goal may depend on your risk for heart disease  It may also depend on your age and other health conditions  Ask your healthcare provider if the following goals are right for you:  · Your total cholesterol level  should be less than 200 mg/dL  This number may also depend on your HDL and LDL cholesterol goals  · Your LDL cholesterol level  should be less than 130 mg/dL  · Your HDL cholesterol level  should be 60 mg/dL or higher  · Your triglyceride level  should be less than 150 mg/dL  Treatment for high cholesterol:  Treatment for high cholesterol will also decrease your risk of heart disease, heart attack, and stroke  Treatment may include any of the following:  · Medicines  may be given to lower your LDL cholesterol, triglyceride levels, or total cholesterol level  You may need medicines to lower your cholesterol if any of the following is true:     ¨ You have a history of stroke, TIA, unstable angina, or a heart attack    ¨ Your LDL cholesterol level is 190 mg/dL or higher    ¨ You are age 36to 76years of age, have diabetes, and your LDL cholesterol is 70 mg/dL or higher    ¨ You are age 36to 76years of age, have risk factors for heart disease, and your LDL cholesterol is 70 mg/dL or higher    · Lifestyle changes  include changes to your diet, exercise, weight loss, and quitting smoking  It also includes decreasing the amount of alcohol you drink  · Supplements  include fish oil, red yeast rice, and garlic  Fish oil may help lower your triglyceride and LDL cholesterol levels  It may also increase your HDL cholesterol level  Red yeast rice may help decrease your total cholesterol level and LDL cholesterol level  Garlic may help lower your total cholesterol level  Do not take these supplements without talking to your healthcare provider     Nutrition to help lower your cholesterol levels:  A registered dietitian can help you create a healthy eating plan  Read food labels and choose foods low in saturated fat, trans fats, and cholesterol  · Decrease the total amount of fat you eat  Choose lean meats, fat-free or 1% fat milk, and low-fat dairy products, such as yogurt and cheese  Try to limit or avoid red meats  Limit or do not eat fried foods or baked goods such as cookies  · Replace unhealthy fats with healthy fats  Cook foods in olive oil or canola oil  Choose soft margarines that are low in saturated fat and trans fat  Seeds, nuts, and avocados are other examples of healthy fats  · Eat foods with omega-3 fats  Examples include salmon, tuna, mackerel, walnuts, and flaxseed  Eat fish 2 times per week  Children and pregnant women should not eat fish that have high levels of mercury, such as shark, swordfish, and jeremy mackerel  · Increase the amount of plant-based foods you eat  Plant-based foods are low in cholesterol and fat  Eating more of these foods may help lower your cholesterol and help you lose weight  Examples of plant-based foods includes fruits, vegetables, legumes, and whole grains  Replace milk that contains dairy with almond, soy, or coconut milk  Eat beans and foods with soy for protein instead of meat  Ask your healthcare provider or dietitian for more information on plant-based foods  · Increase the amount of fiber you eat  High-fiber foods can help lower your LDL cholesterol  You should eat between 20 and 30 grams of fiber each day  Eat at least 5 servings of fruits and vegetables each day  Other examples of high-fiber foods include whole-grain or whole-wheat breads, pastas, or cereals, and brown rice  Eat 3 ounces of whole-grain foods each day  Increase fiber slowly  You may have abdominal discomfort, bloating, and gas if you add fiber to your diet too quickly  Lifestyle changes you can make to help lower your cholesterol levels:   · Maintain a healthy weight  Ask your healthcare provider how much you should weigh  Ask him or her to help you create a weight loss plan if you are overweight  Weight loss can decrease your total cholesterol and triglyceride levels  · Exercise regularly  Exercise can help lower your total cholesterol level and maintain a healthy weight  Exercise can also help increase your HDL cholesterol level  Work with your healthcare provider to create an exercise program that is right for you  Get at least 30 minutes of moderate exercise most days of the week  Examples of exercise include brisk walking, swimming, or biking  · Do not smoke  Nicotine and other chemicals in cigarettes and cigars can damage your lungs, heart, and blood vessels  They can also raise your triglyceride levels  Ask your healthcare provider for information if you currently smoke and need help to quit  E-cigarettes or smokeless tobacco still contain nicotine  Talk to your healthcare provider before you use these products  · Limit or do not drink alcohol  Alcohol can increase your triglyceride levels  Ask your healthcare provider if it is safe for you to drink alcohol  Also ask how much is safe for you to drink each day  © 2017 [facility] Information is for End User's use only and may not be sold, redistributed or otherwise used for commercial purposes  All illustrations and images included in CareNotes® are the copyrighted property of [employer]  or [employer]  The above information is an  only  It is not intended as medical advice for individual conditions or treatments  Talk to your doctor, nurse or pharmacist before following any medical regimen to see if it is safe and effective for you

## 2020-03-02 ENCOUNTER — TELEPHONE (OUTPATIENT)
Dept: FAMILY MEDICINE CLINIC | Facility: CLINIC | Age: 60
End: 2020-03-02

## 2020-03-02 DIAGNOSIS — M25.561 CHRONIC PAIN OF BOTH KNEES: Primary | ICD-10-CM

## 2020-03-02 DIAGNOSIS — G89.29 CHRONIC PAIN OF BOTH KNEES: Primary | ICD-10-CM

## 2020-03-02 DIAGNOSIS — M25.562 CHRONIC PAIN OF BOTH KNEES: Primary | ICD-10-CM

## 2020-03-02 NOTE — TELEPHONE ENCOUNTER
----- [facility] at 3/2/2020  7:52 AM EST -----  Patient's x-rays of her knees show mild to moderate arthritic changes in the medial or inside part of her knees  Patient can do physical therapy or see an orthopedic surgeon

## 2020-03-18 DIAGNOSIS — I10 HYPERTENSION, ESSENTIAL: ICD-10-CM

## 2020-03-18 RX ORDER — METOPROLOL SUCCINATE 100 MG/1
TABLET, EXTENDED RELEASE ORAL
Qty: 90 TABLET | Refills: 1 | Status: SHIPPED | OUTPATIENT
Start: 2020-03-18 | End: 2020-09-04

## 2020-04-19 DIAGNOSIS — K21.9 GASTROESOPHAGEAL REFLUX DISEASE, ESOPHAGITIS PRESENCE NOT SPECIFIED: ICD-10-CM

## 2020-04-20 RX ORDER — OMEPRAZOLE 40 MG/1
CAPSULE, DELAYED RELEASE ORAL
Qty: 90 CAPSULE | Refills: 0 | Status: SHIPPED | OUTPATIENT
Start: 2020-04-20 | End: 2020-07-15

## 2020-05-04 DIAGNOSIS — M25.50 ARTHRALGIA, UNSPECIFIED JOINT: ICD-10-CM

## 2020-05-04 RX ORDER — IBUPROFEN 600 MG/1
600 TABLET ORAL EVERY 6 HOURS PRN
Qty: 90 TABLET | Refills: 0 | Status: SHIPPED | OUTPATIENT
Start: 2020-05-04 | End: 2020-08-21

## 2020-07-15 DIAGNOSIS — K21.9 GASTROESOPHAGEAL REFLUX DISEASE, ESOPHAGITIS PRESENCE NOT SPECIFIED: ICD-10-CM

## 2020-07-15 RX ORDER — OMEPRAZOLE 40 MG/1
CAPSULE, DELAYED RELEASE ORAL
Qty: 90 CAPSULE | Refills: 0 | Status: SHIPPED | OUTPATIENT
Start: 2020-07-15 | End: 2020-10-12

## 2020-08-18 ENCOUNTER — OFFICE VISIT (OUTPATIENT)
Dept: FAMILY MEDICINE CLINIC | Facility: CLINIC | Age: 60
End: 2020-08-18
Payer: COMMERCIAL

## 2020-08-18 VITALS
OXYGEN SATURATION: 99 % | TEMPERATURE: 98.3 F | BODY MASS INDEX: 37.56 KG/M2 | HEIGHT: 64 IN | SYSTOLIC BLOOD PRESSURE: 112 MMHG | DIASTOLIC BLOOD PRESSURE: 64 MMHG | WEIGHT: 220 LBS | HEART RATE: 100 BPM

## 2020-08-18 DIAGNOSIS — H61.21 IMPACTED CERUMEN OF RIGHT EAR: ICD-10-CM

## 2020-08-18 DIAGNOSIS — I10 ESSENTIAL HYPERTENSION: ICD-10-CM

## 2020-08-18 DIAGNOSIS — M17.12 ARTHRITIS OF LEFT KNEE: ICD-10-CM

## 2020-08-18 DIAGNOSIS — M25.561 CHRONIC PAIN OF BOTH KNEES: Primary | ICD-10-CM

## 2020-08-18 DIAGNOSIS — M25.562 CHRONIC PAIN OF BOTH KNEES: Primary | ICD-10-CM

## 2020-08-18 DIAGNOSIS — G89.29 CHRONIC PAIN OF BOTH KNEES: Primary | ICD-10-CM

## 2020-08-18 PROCEDURE — 1036F TOBACCO NON-USER: CPT | Performed by: FAMILY MEDICINE

## 2020-08-18 PROCEDURE — 3078F DIAST BP <80 MM HG: CPT | Performed by: FAMILY MEDICINE

## 2020-08-18 PROCEDURE — 3074F SYST BP LT 130 MM HG: CPT | Performed by: FAMILY MEDICINE

## 2020-08-18 PROCEDURE — 3725F SCREEN DEPRESSION PERFORMED: CPT | Performed by: FAMILY MEDICINE

## 2020-08-18 PROCEDURE — 69209 REMOVE IMPACTED EAR WAX UNI: CPT | Performed by: FAMILY MEDICINE

## 2020-08-18 PROCEDURE — 99214 OFFICE O/P EST MOD 30 MIN: CPT | Performed by: FAMILY MEDICINE

## 2020-08-18 PROCEDURE — 3008F BODY MASS INDEX DOCD: CPT | Performed by: FAMILY MEDICINE

## 2020-08-18 NOTE — PATIENT INSTRUCTIONS
Follow up with Orthopedic Surgery  Please use the Debrox drops in your ear for the ear wax  Follow up for recheck and ear lavage in 2 weeks

## 2020-08-18 NOTE — PROGRESS NOTES
Assessment/Plan:    No problem-specific Assessment & Plan notes found for this encounter  Diagnoses and all orders for this visit:    Arthritis of left knee  PT unsuccessful per patient  PCP reccommended follow up with Ortho, referral sent and patient to follow up with [facility] orthopedics due to insurance  -     Ambulatory referral to Orthopedic Surgery; Future    Impacted cerumen of right ear  Ear lavage done in office    -     carbamide peroxide (DEBROX) 6 5 % otic solution; Administer 10 drops to the right ear 2 (two) times a day    Essential hypertension  BP controlled at 112/64, continue current management with Metoprolol succinate 100 mg 24 hr        Subjective:      Patient ID: Kenya Zamarripa is a 63 y o  female  HPI     Patient states that she is here for her follow up  She did not get her blood work done before this visit  States that she has been taking her medications without a problem  She has been feeling well until her earache began  About 2 months ago, got water in her ear when showering  Notes that it is now muffled and some pain  Denies fever or chills  Denies drainage  Denies dizziness or vertigo symptoms  Patient also states that her left knee pain continues  She was doing PT without improvement, seemed helpful at first but then it was painful and not longer helpful  Was doing it for about 5 visits  States that she has pain on the inside of her knee  The following portions of the patient's history were reviewed and updated as appropriate: allergies, current medications, past family history, past medical history, past social history, past surgical history and problem list     Review of Systems   Constitutional: Negative for activity change, appetite change, fatigue and fever  HENT: Positive for ear pain (mild, right) and hearing loss (decreased on the right)  Negative for congestion and sore throat  Eyes: Negative for pain     Respiratory: Negative for cough and shortness of breath  Cardiovascular: Negative for chest pain and leg swelling  Gastrointestinal: Negative for abdominal distention, abdominal pain, constipation, diarrhea, nausea and vomiting  Genitourinary: Negative for dysuria, frequency and urgency  Musculoskeletal: Negative for gait problem  Skin: Negative for rash  Neurological: Negative for dizziness, light-headedness and headaches  Objective:    /64   Pulse 100   Temp 98 3 °F (36 8 °C)   Ht 5' 4" (1 626 m)   Wt 99 8 kg (220 lb)   SpO2 99%   BMI 37 76 kg/m²      Physical Exam  Vitals signs reviewed  Constitutional:       General: She is not in acute distress  Appearance: Normal appearance  HENT:      Head: Normocephalic and atraumatic  Right Ear: External ear normal  There is impacted cerumen  Left Ear: Tympanic membrane, ear canal and external ear normal       Nose: Nose normal       Mouth/Throat:      Mouth: Mucous membranes are moist    Eyes:      Extraocular Movements: Extraocular movements intact  Conjunctiva/sclera: Conjunctivae normal       Pupils: Pupils are equal, round, and reactive to light  Neck:      Musculoskeletal: Neck supple  Cardiovascular:      Rate and Rhythm: Normal rate and regular rhythm  Heart sounds: Normal heart sounds  Pulmonary:      Effort: Pulmonary effort is normal       Breath sounds: Normal breath sounds  No wheezing, rhonchi or rales  Abdominal:      General: Abdomen is flat  Bowel sounds are normal  There is no distension  Palpations: Abdomen is soft  Tenderness: There is no abdominal tenderness  Musculoskeletal:         General: No deformity  Right lower leg: No edema  Left lower leg: No edema  Lymphadenopathy:      Cervical: No cervical adenopathy  Skin:     General: Skin is warm  Capillary Refill: Capillary refill takes less than 2 seconds  Findings: No rash  Neurological:      Mental Status: She is alert   Mental status is at baseline  Ear cerumen removal    Date/Time: 8/18/2020 3:17 PM  Performed by: Michael Ortega DO  Authorized by: Michael Ortega DO     Patient location:  Clinic  Indications / Diagnosis:  Cerumen impaction with muffled hearing  Other Assisting Provider: No    Consent:     Consent obtained:  Verbal    Consent given by:  Patient    Risks discussed:  Pain, incomplete removal, bleeding and TM perforation    Alternatives discussed:  No treatment, observation and referral  Universal protocol:     Procedure explained and questions answered to patient or proxy's satisfaction: yes      Patient identity confirmed:  Verbally with patient  Procedure details:     Local anesthetic:  None    Location:  R ear    Procedure type: irrigation only      Approach:  Natural orifice  Post-procedure details:     Complication:  None    Hearing quality:  Improved    Patient tolerance of procedure:   Tolerated well, no immediate complications      Michael Ortega DO  [facility]  8/18/2020 3:43 PM

## 2020-08-19 ENCOUNTER — TELEPHONE (OUTPATIENT)
Dept: ADMINISTRATIVE | Facility: OTHER | Age: 60
End: 2020-08-19

## 2020-08-19 NOTE — TELEPHONE ENCOUNTER
----- Message from Boston Hobson sent at 8/18/2020  2:19 PM EDT -----  Regarding: pap smear  08/18/20 2:19 PM    Hello, our patient Brenda Smith has had Pap Smear (HPV) aka Cervical Cancer Screening completed/performed  Please assist in updating the patient chart by pulling the Care Everywhere (CE) document  The date of service is        Thank you,  Boston Hobson  [place] [id number] [place]

## 2020-08-19 NOTE — TELEPHONE ENCOUNTER
Upon review of the In Basket request we were able to locate, review, and update the patient chart as requested for Pap Smear (HPV) aka Cervical Cancer Screening  Any additional questions or concerns should be emailed to the Practice Liaisons via [email or web address] email, please do not reply via In Basket      Thank you  Chapis Darrow [place]

## 2020-08-20 DIAGNOSIS — M25.50 ARTHRALGIA, UNSPECIFIED JOINT: ICD-10-CM

## 2020-08-21 RX ORDER — IBUPROFEN 600 MG/1
600 TABLET ORAL EVERY 6 HOURS PRN
Qty: 90 TABLET | Refills: 0 | Status: SHIPPED | OUTPATIENT
Start: 2020-08-21 | End: 2020-09-10

## 2020-09-01 ENCOUNTER — OFFICE VISIT (OUTPATIENT)
Dept: FAMILY MEDICINE CLINIC | Facility: CLINIC | Age: 60
End: 2020-09-01
Payer: COMMERCIAL

## 2020-09-01 VITALS
HEART RATE: 68 BPM | OXYGEN SATURATION: 96 % | HEIGHT: 64 IN | WEIGHT: 218 LBS | TEMPERATURE: 97.8 F | BODY MASS INDEX: 37.22 KG/M2 | DIASTOLIC BLOOD PRESSURE: 86 MMHG | SYSTOLIC BLOOD PRESSURE: 124 MMHG

## 2020-09-01 DIAGNOSIS — H69.81 EUSTACHIAN TUBE DYSFUNCTION, RIGHT: Primary | ICD-10-CM

## 2020-09-01 DIAGNOSIS — H61.21 IMPACTED CERUMEN OF RIGHT EAR: ICD-10-CM

## 2020-09-01 PROCEDURE — 3079F DIAST BP 80-89 MM HG: CPT | Performed by: FAMILY MEDICINE

## 2020-09-01 PROCEDURE — NC001 PR NO CHARGE: Performed by: FAMILY MEDICINE

## 2020-09-01 PROCEDURE — 1036F TOBACCO NON-USER: CPT | Performed by: FAMILY MEDICINE

## 2020-09-01 PROCEDURE — 3725F SCREEN DEPRESSION PERFORMED: CPT | Performed by: FAMILY MEDICINE

## 2020-09-01 PROCEDURE — 99213 OFFICE O/P EST LOW 20 MIN: CPT | Performed by: FAMILY MEDICINE

## 2020-09-01 RX ORDER — FLUTICASONE PROPIONATE 50 MCG
2 SPRAY, SUSPENSION (ML) NASAL DAILY
Qty: 16 G | Refills: 2 | Status: SHIPPED | OUTPATIENT
Start: 2020-09-01 | End: 2020-12-11 | Stop reason: SDUPTHER

## 2020-09-01 NOTE — PATIENT INSTRUCTIONS
Please get your mammogram done  Follow up in our office if your ear pain returns or new symptoms develop

## 2020-09-01 NOTE — PROGRESS NOTES
Assessment/Plan:     Diagnoses and all orders for this visit:    Eustachian tube dysfunction, right  Small effusion, clear fluid on the right  No concern for otitis  -     fluticasone (FLONASE) 50 mcg/act nasal spray; 2 sprays into each nostril daily    Impacted cerumen of right ear  Well tolerated  Complete visualization of TM    -     Ear cerumen removal      Subjective:      Patient ID: Jovi Marino is a 63 y o  female  HPI    Feels that her right ear is blocked  Muffled hearing  Notes a small amount of pain (pressure)  Took Mortin once, relieved it  Notes that she hears crackling occasioanlly  Denies drainage  States that she has been using the Debrox 2 times per day  Denies any fever or chills  Denies any congestion or sore throat  The following portions of the patient's history were reviewed and updated as appropriate: allergies, current medications, past family history, past medical history, past social history, past surgical history and problem list     Review of Systems   Constitutional: Negative for activity change, appetite change, fatigue and fever  HENT: Positive for ear pain (mild, pressure)  Negative for congestion, ear discharge, postnasal drip, rhinorrhea, sinus pressure, sinus pain and sore throat  Eyes: Negative for pain  Respiratory: Negative for cough and shortness of breath  Cardiovascular: Negative for chest pain and leg swelling  Gastrointestinal: Negative for abdominal distention, abdominal pain, constipation, diarrhea, nausea and vomiting  Genitourinary: Negative for dysuria, frequency and urgency  Musculoskeletal: Negative for gait problem  Skin: Negative for rash  Neurological: Negative for dizziness, light-headedness and headaches  Objective:    /86   Pulse 68   Temp 97 8 °F (36 6 °C)   Ht 5' 4" (1 626 m)   Wt 98 9 kg (218 lb)   SpO2 96%   BMI 37 42 kg/m²      Physical Exam  Vitals signs reviewed     Constitutional:       General: She is not in acute distress  Appearance: Normal appearance  HENT:      Head: Normocephalic and atraumatic  Right Ear: External ear normal  There is impacted cerumen  Left Ear: Tympanic membrane, ear canal and external ear normal       Mouth/Throat:      Mouth: Mucous membranes are moist    Eyes:      Extraocular Movements: Extraocular movements intact  Conjunctiva/sclera: Conjunctivae normal    Pulmonary:      Effort: Pulmonary effort is normal    Skin:     Capillary Refill: Capillary refill takes less than 2 seconds  Neurological:      Mental Status: She is alert  Mental status is at baseline  Ear cerumen removal    Date/Time: 9/1/2020 8:27 AM  Performed by: Jan Christian DO  Authorized by: Jan Christian DO     Patient location:  Clinic  Consent:     Consent obtained:  Verbal    Consent given by:  Patient    Risks discussed:  Bleeding, infection, dizziness, incomplete removal, pain and TM perforation  Procedure details:     Local anesthetic:  None    Location:  R ear    Procedure type: irrigation only    Post-procedure details:     Complication:  None    Hearing quality:  Improved    Patient tolerance of procedure: Tolerated well, no immediate complications  Comments:      Complete removal with total visualization of the TM    After cerumen removal, right TM visualized and intact  Small clear fluid effusion, no purulence  TM with some scarring, not erythematous or bulging      Jan Christian DO  [facility]  9/1/2020 9:53 AM

## 2020-09-04 DIAGNOSIS — I10 HYPERTENSION, ESSENTIAL: ICD-10-CM

## 2020-09-04 RX ORDER — METOPROLOL SUCCINATE 100 MG/1
TABLET, EXTENDED RELEASE ORAL
Qty: 90 TABLET | Refills: 1 | Status: SHIPPED | OUTPATIENT
Start: 2020-09-04 | End: 2021-03-04

## 2020-09-09 DIAGNOSIS — M25.50 ARTHRALGIA, UNSPECIFIED JOINT: ICD-10-CM

## 2020-09-09 NOTE — TELEPHONE ENCOUNTER
Requested medication(s) are due for refill today: Yes  Patient has already received a courtesy refill: No  Other reason request has been forwarded to provider:    Recent request for labs not completed, please advise

## 2020-09-10 RX ORDER — IBUPROFEN 600 MG/1
600 TABLET ORAL EVERY 6 HOURS PRN
Qty: 90 TABLET | Refills: 0 | Status: SHIPPED | OUTPATIENT
Start: 2020-09-10

## 2020-10-02 ENCOUNTER — IMMUNIZATIONS (OUTPATIENT)
Dept: FAMILY MEDICINE CLINIC | Facility: CLINIC | Age: 60
End: 2020-10-02
Payer: COMMERCIAL

## 2020-10-02 DIAGNOSIS — Z23 ENCOUNTER FOR IMMUNIZATION: ICD-10-CM

## 2020-10-02 PROCEDURE — 90682 RIV4 VACC RECOMBINANT DNA IM: CPT

## 2020-10-02 PROCEDURE — 90471 IMMUNIZATION ADMIN: CPT

## 2020-10-06 ENCOUNTER — HOSPITAL ENCOUNTER (OUTPATIENT)
Dept: MAMMOGRAPHY | Facility: CLINIC | Age: 60
Discharge: HOME | End: 2020-10-06
Payer: COMMERCIAL

## 2020-10-06 VITALS — WEIGHT: 218 LBS | HEIGHT: 64 IN | BODY MASS INDEX: 37.22 KG/M2

## 2020-10-06 DIAGNOSIS — Z12.31 ENCOUNTER FOR SCREENING MAMMOGRAM FOR BREAST CANCER: ICD-10-CM

## 2020-10-06 PROCEDURE — 77067 SCR MAMMO BI INCL CAD: CPT

## 2020-10-06 PROCEDURE — 77063 BREAST TOMOSYNTHESIS BI: CPT

## 2020-10-11 DIAGNOSIS — K21.9 GASTROESOPHAGEAL REFLUX DISEASE: ICD-10-CM

## 2020-10-12 RX ORDER — OMEPRAZOLE 40 MG/1
CAPSULE, DELAYED RELEASE ORAL
Qty: 90 CAPSULE | Refills: 0 | Status: SHIPPED | OUTPATIENT
Start: 2020-10-12 | End: 2020-10-28 | Stop reason: SDUPTHER

## 2020-10-28 DIAGNOSIS — K21.9 GASTROESOPHAGEAL REFLUX DISEASE: ICD-10-CM

## 2020-10-28 RX ORDER — OMEPRAZOLE 40 MG/1
40 CAPSULE, DELAYED RELEASE ORAL DAILY
Qty: 90 CAPSULE | Refills: 0 | Status: SHIPPED | OUTPATIENT
Start: 2020-10-28 | End: 2021-01-23

## 2020-12-08 ENCOUNTER — OFFICE VISIT (OUTPATIENT)
Dept: FAMILY MEDICINE CLINIC | Facility: CLINIC | Age: 60
End: 2020-12-08
Payer: COMMERCIAL

## 2020-12-08 VITALS
SYSTOLIC BLOOD PRESSURE: 114 MMHG | OXYGEN SATURATION: 98 % | BODY MASS INDEX: 38.07 KG/M2 | DIASTOLIC BLOOD PRESSURE: 80 MMHG | HEART RATE: 84 BPM | HEIGHT: 64 IN | TEMPERATURE: 97.4 F | WEIGHT: 223 LBS

## 2020-12-08 DIAGNOSIS — H69.91 EUSTACHIAN TUBE DISORDER, RIGHT: Primary | ICD-10-CM

## 2020-12-08 DIAGNOSIS — L71.9 ROSACEA: ICD-10-CM

## 2020-12-08 DIAGNOSIS — M25.50 ARTHRALGIA, UNSPECIFIED JOINT: ICD-10-CM

## 2020-12-08 PROCEDURE — 1036F TOBACCO NON-USER: CPT | Performed by: PHYSICIAN ASSISTANT

## 2020-12-08 PROCEDURE — 3725F SCREEN DEPRESSION PERFORMED: CPT | Performed by: PHYSICIAN ASSISTANT

## 2020-12-08 PROCEDURE — 3074F SYST BP LT 130 MM HG: CPT | Performed by: PHYSICIAN ASSISTANT

## 2020-12-08 PROCEDURE — 99213 OFFICE O/P EST LOW 20 MIN: CPT | Performed by: PHYSICIAN ASSISTANT

## 2020-12-08 PROCEDURE — 3008F BODY MASS INDEX DOCD: CPT | Performed by: PHYSICIAN ASSISTANT

## 2020-12-08 PROCEDURE — 3079F DIAST BP 80-89 MM HG: CPT | Performed by: PHYSICIAN ASSISTANT

## 2020-12-08 RX ORDER — RIVAROXABAN 20 MG/1
20 TABLET, FILM COATED ORAL DAILY
COMMUNITY
Start: 2020-10-02 | End: 2021-08-17 | Stop reason: ALTCHOICE

## 2020-12-08 RX ORDER — METRONIDAZOLE 7.5 MG/G
LOTION TOPICAL 2 TIMES DAILY
Qty: 59 ML | Refills: 5 | Status: SHIPPED | OUTPATIENT
Start: 2020-12-08 | End: 2022-07-05 | Stop reason: SDUPTHER

## 2020-12-08 RX ORDER — IBUPROFEN 600 MG/1
600 TABLET ORAL EVERY 6 HOURS PRN
Qty: 90 TABLET | Refills: 0 | Status: CANCELLED | OUTPATIENT
Start: 2020-12-08

## 2020-12-11 DIAGNOSIS — H69.81 EUSTACHIAN TUBE DYSFUNCTION, RIGHT: ICD-10-CM

## 2020-12-11 RX ORDER — FLUTICASONE PROPIONATE 50 MCG
2 SPRAY, SUSPENSION (ML) NASAL DAILY
Qty: 16 G | Refills: 2 | Status: SHIPPED | OUTPATIENT
Start: 2020-12-11 | End: 2020-12-12

## 2020-12-12 RX ORDER — FLUTICASONE PROPIONATE 50 MCG
SPRAY, SUSPENSION (ML) NASAL
Qty: 48 ML | Refills: 0 | Status: SHIPPED | OUTPATIENT
Start: 2020-12-12

## 2021-01-23 DIAGNOSIS — K21.9 GASTROESOPHAGEAL REFLUX DISEASE: ICD-10-CM

## 2021-01-23 RX ORDER — OMEPRAZOLE 40 MG/1
CAPSULE, DELAYED RELEASE ORAL
Qty: 90 CAPSULE | Refills: 0 | Status: SHIPPED | OUTPATIENT
Start: 2021-01-23 | End: 2021-01-29 | Stop reason: SDUPTHER

## 2021-01-29 DIAGNOSIS — K21.9 GASTROESOPHAGEAL REFLUX DISEASE: ICD-10-CM

## 2021-01-29 RX ORDER — OMEPRAZOLE 40 MG/1
40 CAPSULE, DELAYED RELEASE ORAL DAILY
Qty: 90 CAPSULE | Refills: 0 | Status: SHIPPED | OUTPATIENT
Start: 2021-01-29 | End: 2021-05-03 | Stop reason: SDUPTHER

## 2021-03-04 DIAGNOSIS — I10 HYPERTENSION, ESSENTIAL: ICD-10-CM

## 2021-03-04 RX ORDER — METOPROLOL SUCCINATE 100 MG/1
TABLET, EXTENDED RELEASE ORAL
Qty: 90 TABLET | Refills: 1 | Status: SHIPPED | OUTPATIENT
Start: 2021-03-04

## 2021-05-03 DIAGNOSIS — K21.9 GASTROESOPHAGEAL REFLUX DISEASE: ICD-10-CM

## 2021-05-03 RX ORDER — OMEPRAZOLE 40 MG/1
40 CAPSULE, DELAYED RELEASE ORAL DAILY
Qty: 90 CAPSULE | Refills: 1 | Status: SHIPPED | OUTPATIENT
Start: 2021-05-03 | End: 2022-01-14

## 2021-07-12 ENCOUNTER — TELEPHONE (OUTPATIENT)
Dept: FAMILY MEDICINE CLINIC | Facility: CLINIC | Age: 61
End: 2021-07-12

## 2021-07-12 NOTE — TELEPHONE ENCOUNTER
Pt called and states that she is going to be scheduling her Annual Physical which is overdue  Pt is requesting lab orders be placed to have completed prior to her appt  Pr would like to have her routine labs done CMP, CBC etc  Please advise

## 2021-07-14 ENCOUNTER — APPOINTMENT (OUTPATIENT)
Dept: LAB | Facility: CLINIC | Age: 61
End: 2021-07-14
Payer: COMMERCIAL

## 2021-07-14 DIAGNOSIS — M54.16 LUMBAR RADICULOPATHY: ICD-10-CM

## 2021-07-14 DIAGNOSIS — R73.01 IMPAIRED FASTING GLUCOSE: ICD-10-CM

## 2021-07-14 DIAGNOSIS — E66.01 MORBID OBESITY (HCC): ICD-10-CM

## 2021-07-14 DIAGNOSIS — I10 ESSENTIAL HYPERTENSION: ICD-10-CM

## 2021-07-14 LAB
ALBUMIN SERPL BCP-MCNC: 3.4 G/DL (ref 3.5–5)
ALP SERPL-CCNC: 111 U/L (ref 46–116)
ALT SERPL W P-5'-P-CCNC: 29 U/L (ref 12–78)
ANION GAP SERPL CALCULATED.3IONS-SCNC: 5 MMOL/L (ref 4–13)
AST SERPL W P-5'-P-CCNC: 17 U/L (ref 5–45)
BASOPHILS # BLD AUTO: 0.05 THOUSANDS/ΜL (ref 0–0.1)
BASOPHILS NFR BLD AUTO: 1 % (ref 0–1)
BILIRUB SERPL-MCNC: 0.31 MG/DL (ref 0.2–1)
BUN SERPL-MCNC: 19 MG/DL (ref 5–25)
CALCIUM ALBUM COR SERPL-MCNC: 9.6 MG/DL (ref 8.3–10.1)
CALCIUM SERPL-MCNC: 9.1 MG/DL (ref 8.3–10.1)
CHLORIDE SERPL-SCNC: 106 MMOL/L (ref 100–108)
CHOLEST SERPL-MCNC: 163 MG/DL (ref 50–200)
CO2 SERPL-SCNC: 28 MMOL/L (ref 21–32)
CREAT SERPL-MCNC: 1.02 MG/DL (ref 0.6–1.3)
EOSINOPHIL # BLD AUTO: 0.34 THOUSAND/ΜL (ref 0–0.61)
EOSINOPHIL NFR BLD AUTO: 4 % (ref 0–6)
ERYTHROCYTE [DISTWIDTH] IN BLOOD BY AUTOMATED COUNT: 13.5 % (ref 11.6–15.1)
EST. AVERAGE GLUCOSE BLD GHB EST-MCNC: 120 MG/DL
GFR SERPL CREATININE-BSD FRML MDRD: 60 ML/MIN/1.73SQ M
GLUCOSE P FAST SERPL-MCNC: 99 MG/DL (ref 65–99)
HBA1C MFR BLD: 5.8 %
HCT VFR BLD AUTO: 43.3 % (ref 34.8–46.1)
HDLC SERPL-MCNC: 42 MG/DL
HGB BLD-MCNC: 13.6 G/DL (ref 11.5–15.4)
IMM GRANULOCYTES # BLD AUTO: 0.06 THOUSAND/UL (ref 0–0.2)
IMM GRANULOCYTES NFR BLD AUTO: 1 % (ref 0–2)
LDLC SERPL CALC-MCNC: 100 MG/DL (ref 0–100)
LYMPHOCYTES # BLD AUTO: 3.09 THOUSANDS/ΜL (ref 0.6–4.47)
LYMPHOCYTES NFR BLD AUTO: 36 % (ref 14–44)
MCH RBC QN AUTO: 26.6 PG (ref 26.8–34.3)
MCHC RBC AUTO-ENTMCNC: 31.4 G/DL (ref 31.4–37.4)
MCV RBC AUTO: 85 FL (ref 82–98)
MONOCYTES # BLD AUTO: 0.76 THOUSAND/ΜL (ref 0.17–1.22)
MONOCYTES NFR BLD AUTO: 9 % (ref 4–12)
NEUTROPHILS # BLD AUTO: 4.19 THOUSANDS/ΜL (ref 1.85–7.62)
NEUTS SEG NFR BLD AUTO: 49 % (ref 43–75)
NRBC BLD AUTO-RTO: 0 /100 WBCS
PLATELET # BLD AUTO: 248 THOUSANDS/UL (ref 149–390)
PMV BLD AUTO: 10.3 FL (ref 8.9–12.7)
POTASSIUM SERPL-SCNC: 4.2 MMOL/L (ref 3.5–5.3)
PROT SERPL-MCNC: 7.3 G/DL (ref 6.4–8.2)
RBC # BLD AUTO: 5.11 MILLION/UL (ref 3.81–5.12)
SODIUM SERPL-SCNC: 139 MMOL/L (ref 136–145)
TRIGL SERPL-MCNC: 106 MG/DL
TSH SERPL DL<=0.05 MIU/L-ACNC: 2.63 UIU/ML (ref 0.36–3.74)
WBC # BLD AUTO: 8.49 THOUSAND/UL (ref 4.31–10.16)

## 2021-07-14 PROCEDURE — 83036 HEMOGLOBIN GLYCOSYLATED A1C: CPT

## 2021-07-14 PROCEDURE — 84443 ASSAY THYROID STIM HORMONE: CPT

## 2021-07-14 PROCEDURE — 36415 COLL VENOUS BLD VENIPUNCTURE: CPT

## 2021-07-14 PROCEDURE — 80061 LIPID PANEL: CPT

## 2021-07-14 PROCEDURE — 80053 COMPREHEN METABOLIC PANEL: CPT

## 2021-07-14 PROCEDURE — 85025 COMPLETE CBC W/AUTO DIFF WBC: CPT

## 2021-08-17 ENCOUNTER — OFFICE VISIT (OUTPATIENT)
Dept: FAMILY MEDICINE CLINIC | Facility: CLINIC | Age: 61
End: 2021-08-17
Payer: COMMERCIAL

## 2021-08-17 VITALS
HEIGHT: 64 IN | HEART RATE: 94 BPM | DIASTOLIC BLOOD PRESSURE: 74 MMHG | SYSTOLIC BLOOD PRESSURE: 128 MMHG | TEMPERATURE: 97.5 F | WEIGHT: 223 LBS | OXYGEN SATURATION: 97 % | BODY MASS INDEX: 38.07 KG/M2

## 2021-08-17 DIAGNOSIS — Z00.00 ANNUAL PHYSICAL EXAM: Primary | ICD-10-CM

## 2021-08-17 PROCEDURE — 99396 PREV VISIT EST AGE 40-64: CPT | Performed by: PHYSICIAN ASSISTANT

## 2021-08-17 PROCEDURE — 3725F SCREEN DEPRESSION PERFORMED: CPT | Performed by: PHYSICIAN ASSISTANT

## 2021-08-17 PROCEDURE — 3008F BODY MASS INDEX DOCD: CPT | Performed by: PHYSICIAN ASSISTANT

## 2021-08-17 PROCEDURE — 1036F TOBACCO NON-USER: CPT | Performed by: PHYSICIAN ASSISTANT

## 2021-08-17 RX ORDER — LOSARTAN POTASSIUM 100 MG/1
100 TABLET ORAL DAILY
COMMUNITY
Start: 2021-07-10

## 2021-08-17 RX ORDER — APIXABAN 2.5 MG/1
2.5 TABLET, FILM COATED ORAL 2 TIMES DAILY
COMMUNITY
Start: 2021-07-14

## 2021-08-17 NOTE — PROGRESS NOTES
[place] [id number] [address]    NAME: Andrea Lester  AGE: 57 y o  SEX: female  : 1960     DATE: 2021     Assessment and Plan:     Problem List Items Addressed This Visit        Other    Annual physical exam - Primary          Immunizations and preventive care screenings were discussed with patient today  Appropriate education was printed on patient's after visit summary  Counseling:  Dental Health: discussed importance of regular tooth brushing, flossing, and dental visits  Injury prevention: discussed safety/seat belts, safety helmets, smoke detectors, carbon dioxide detectors, and smoking near bedding or upholstery  · Exercise: the importance of regular exercise/physical activity was discussed  Recommend exercise 3-5 times per week for at least 30 minutes  BMI Counseling: Body mass index is 38 28 kg/m²  The BMI is above normal  Nutrition recommendations include encouraging healthy choices of fruits and vegetables, decreasing fast food intake, consuming healthier snacks, limiting drinks that contain sugar, moderation in carbohydrate intake, increasing intake of lean protein, reducing intake of saturated and trans fat and reducing intake of cholesterol  Exercise recommendations include exercising 3-5 times per week  No pharmacotherapy was ordered  No follow-ups on file  Chief Complaint:     Chief Complaint   Patient presents with    Hypertension     follow up      History of Present Illness:     Adult Annual Physical   Patient here for a comprehensive physical exam  The patient reports no problems  Diet and Physical Activity  · Diet/Nutrition: poor diet  · Exercise: no formal exercise        Depression Screening  PHQ-9 Depression Screening    PHQ-9:   Frequency of the following problems over the past two weeks:      Little interest or pleasure in doing things: 0 - not at all  Feeling down, depressed, or hopeless: 0 - not at all  PHQ-2 Score: 0       General Health  · Sleep: sleeps poorly  · Hearing: normal - bilateral   · Vision: goes for regular eye exams and wears glasses  · Dental: regular dental visits  /GYN Health  · Patient is: postmenopausal  · Last menstrual period: NA  · Contraceptive method: postmenopausal      Review of Systems:     Review of Systems   Constitutional: Negative for appetite change, fatigue, fever and unexpected weight change  HENT: Negative for dental problem, ear pain, hearing loss, nosebleeds, rhinorrhea and trouble swallowing  Eyes: Negative for photophobia, pain, discharge and visual disturbance  Respiratory: Negative for cough, chest tightness, shortness of breath and wheezing  Cardiovascular: Negative for chest pain and palpitations  Gastrointestinal: Negative for abdominal pain, constipation, diarrhea, nausea, rectal pain and vomiting  Endocrine: Negative for cold intolerance, polydipsia, polyphagia and polyuria  Genitourinary: Negative for decreased urine volume, difficulty urinating, dysuria, frequency, hematuria and urgency  Musculoskeletal: Positive for arthralgias  Negative for back pain, gait problem, joint swelling, myalgias, neck pain and neck stiffness  Skin: Negative for color change and rash  Allergic/Immunologic: Negative for environmental allergies, food allergies and immunocompromised state  Neurological: Negative for dizziness, seizures, speech difficulty, light-headedness and headaches  Hematological: Negative for adenopathy  Bruises/bleeds easily  Psychiatric/Behavioral: Negative for behavioral problems, confusion, decreased concentration, self-injury and sleep disturbance  The patient is not nervous/anxious and is not hyperactive         Past Medical History:     Past Medical History:   Diagnosis Date    Hypertension     Impaired fasting glucose     Pain       Past Surgical History:     Past Surgical History:   Procedure Laterality Date    HYSTERECTOMY  12/23/2006    OOPHORECTOMY Right     unilateral    OTHER SURGICAL HISTORY      Excision of lesion Genitalia Meron last assessed 11/01/16    NC COLONOSCOPY FLX DX W/COLLJ SPEC WHEN PFRMD N/A 7/21/2016    Procedure: COLONOSCOPY;  Surgeon: Sam Murphy MD;  [facility]; Service: Gastroenterology    TUBAL LIGATION        Social History:     Social History     Socioeconomic History    Marital status: Single     Spouse name: None    Number of children: None    Years of education: None    Highest education level: None   Occupational History    Occupation: Neuro ass    Tobacco Use    Smoking status: Never Smoker    Smokeless tobacco: Never Used    Tobacco comment: Former   Substance and Sexual Activity    Alcohol use: No    Drug use: No    Sexual activity: Not Currently   Other Topics Concern    None   Social History Narrative        No caffeine use    Seeing a dentist     Social Determinants of Health     Financial Resource Strain:     Difficulty of Paying Living Expenses:    Food Insecurity:     Worried About Running Out of Food in the Last Year:     Ran Out of Food in the Last Year:    Transportation Needs:     Lack of Transportation (Medical):      Lack of Transportation (Non-Medical):    Physical Activity:     Days of Exercise per Week:     Minutes of Exercise per Session:    Stress:     Feeling of Stress :    Social Connections:     Frequency of Communication with Friends and Family:     Frequency of Social Gatherings with Friends and Family:     Attends Jain Services:     Active Member of Clubs or Organizations:     Attends Club or Organization Meetings:     Marital Status:    Intimate Partner Violence:     Fear of Current or Ex-Partner:     Emotionally Abused:     Physically Abused:     Sexually Abused:       Family History:     Family History   Problem Relation Age of Onset    COPD Mother  Heart failure Mother     Hyperlipidemia Mother     Hypertension Mother     COPD Father         moderate    Cancer Maternal Aunt         Urinary bladder    Breast cancer Paternal Grandmother 79    No Known Problems Sister     No Known Problems Maternal Aunt       Current Medications:     Current Outpatient Medications   Medication Sig Dispense Refill    Eliquis 2 5 MG Take 2 5 mg by mouth 2 (two) times a day      fluticasone (FLONASE) 50 mcg/act nasal spray SPRAY 2 SPRAYS INTO EACH NOSTRIL EVERY DAY 48 mL 0    ibuprofen (MOTRIN) 600 mg tablet TAKE 1 TABLET (600 MG TOTAL) BY MOUTH EVERY 6 (SIX) HOURS AS NEEDED FOR MILD PAIN 90 tablet 0    loratadine (CLARITIN) 10 mg tablet Take 1 tablet (10 mg total) by mouth daily 30 tablet 0    losartan (COZAAR) 100 MG tablet Take 100 mg by mouth daily      metoprolol succinate (TOPROL-XL) 100 mg 24 hr tablet TAKE 1 TABLET BY MOUTH EVERY DAY 90 tablet 1    METRONIDAZOLE, TOPICAL, 0 75 % LOTN Apply topically 2 (two) times a day 59 mL 5    omeprazole (PriLOSEC) 40 MG capsule Take 1 capsule (40 mg total) by mouth daily 90 capsule 1     No current facility-administered medications for this visit  Allergies: Allergies   Allergen Reactions    Erythromycin     Sulfa Antibiotics     Tetracycline       Physical Exam:     /74 (BP Location: Left arm, Patient Position: Sitting, Cuff Size: Large)   Pulse 94   Temp 97 5 °F (36 4 °C)   Ht 5' 4" (1 626 m)   Wt 101 kg (223 lb)   SpO2 97%   BMI 38 28 kg/m²     Physical Exam  Vitals and nursing note reviewed  Constitutional:       Appearance: Normal appearance  She is normal weight  HENT:      Head: Normocephalic and atraumatic  Right Ear: Tympanic membrane, ear canal and external ear normal       Left Ear: Tympanic membrane, ear canal and external ear normal       Nose: Nose normal       Mouth/Throat:      Mouth: Mucous membranes are moist       Pharynx: Oropharynx is clear     Eyes:      Extraocular Movements: Extraocular movements intact  Conjunctiva/sclera: Conjunctivae normal       Pupils: Pupils are equal, round, and reactive to light  Neck:      Thyroid: No thyromegaly  Vascular: No carotid bruit  Cardiovascular:      Rate and Rhythm: Normal rate and regular rhythm  Pulses: Normal pulses  Heart sounds: Normal heart sounds  Pulmonary:      Effort: Pulmonary effort is normal       Breath sounds: Normal breath sounds  Abdominal:      General: Abdomen is flat  Bowel sounds are normal       Palpations: Abdomen is soft  There is no hepatomegaly, splenomegaly or mass  Tenderness: There is no abdominal tenderness  There is no right CVA tenderness or left CVA tenderness  Musculoskeletal:         General: Normal range of motion  Cervical back: Normal range of motion and neck supple  Right lower leg: No edema  Left lower leg: No edema  Lymphadenopathy:      Cervical: No cervical adenopathy  Skin:     General: Skin is warm and dry  Neurological:      General: No focal deficit present  Mental Status: She is alert and oriented to person, place, and time  Psychiatric:         Mood and Affect: Mood normal          Behavior: Behavior normal          Thought Content:  Thought content normal          Judgment: Judgment normal        Results for orders placed or performed in visit on 07/14/21   CBC and differential   Result Value Ref Range    WBC 8 49 4 31 - 10 16 Thousand/uL    RBC 5 11 3 81 - 5 12 Million/uL    Hemoglobin 13 6 11 5 - 15 4 g/dL    Hematocrit 43 3 34 8 - 46 1 %    MCV 85 82 - 98 fL    MCH 26 6 (L) 26 8 - 34 3 pg    MCHC 31 4 31 4 - 37 4 g/dL    RDW 13 5 11 6 - 15 1 %    MPV 10 3 8 9 - 12 7 fL    Platelets 285 825 - 997 Thousands/uL    nRBC 0 /100 WBCs    Neutrophils Relative 49 43 - 75 %    Immat GRANS % 1 0 - 2 %    Lymphocytes Relative 36 14 - 44 %    Monocytes Relative 9 4 - 12 %    Eosinophils Relative 4 0 - 6 %    Basophils Relative 1 0 - 1 % Neutrophils Absolute 4 19 1 85 - 7 62 Thousands/µL    Immature Grans Absolute 0 06 0 00 - 0 20 Thousand/uL    Lymphocytes Absolute 3 09 0 60 - 4 47 Thousands/µL    Monocytes Absolute 0 76 0 17 - 1 22 Thousand/µL    Eosinophils Absolute 0 34 0 00 - 0 61 Thousand/µL    Basophils Absolute 0 05 0 00 - 0 10 Thousands/µL   Comprehensive metabolic panel   Result Value Ref Range    Sodium 139 136 - 145 mmol/L    Potassium 4 2 3 5 - 5 3 mmol/L    Chloride 106 100 - 108 mmol/L    CO2 28 21 - 32 mmol/L    ANION GAP 5 4 - 13 mmol/L    BUN 19 5 - 25 mg/dL    Creatinine 1 02 0 60 - 1 30 mg/dL    Glucose, Fasting 99 65 - 99 mg/dL    Calcium 9 1 8 3 - 10 1 mg/dL    Corrected Calcium 9 6 8 3 - 10 1 mg/dL    AST 17 5 - 45 U/L    ALT 29 12 - 78 U/L    Alkaline Phosphatase 111 46 - 116 U/L    Total Protein 7 3 6 4 - 8 2 g/dL    Albumin 3 4 (L) 3 5 - 5 0 g/dL    Total Bilirubin 0 31 0 20 - 1 00 mg/dL    eGFR 60 ml/min/1 73sq m   HEMOGLOBIN A1C W/ EAG ESTIMATION   Result Value Ref Range    Hemoglobin A1C 5 8 (H) Normal 3 8-5 6%; PreDiabetic 5 7-6 4%;  Diabetic >=6 5%; Glycemic control for adults with diabetes <7 0% %     mg/dl   Lipid Panel with Direct LDL reflex   Result Value Ref Range    Cholesterol 163 50 - 200 mg/dL    Triglycerides 106 <=150 mg/dL    HDL, Direct 42 >=40 mg/dL    LDL Calculated 100 0 - 100 mg/dL   TSH, 3rd generation with Free T4 reflex   Result Value Ref Range    TSH 3RD GENERATON 2 630 0 358 - 3 740 uIU/mL        Jenna Knapp PA-C  [address]

## 2021-08-17 NOTE — PATIENT INSTRUCTIONS
Wellness Visit for Adults   AMBULATORY CARE:   A wellness visit  is when you see your healthcare provider to get screened for health problems  Your healthcare provider will also give you advice on how to stay healthy  Write down your questions so you remember to ask them  Ask your healthcare provider how often you should have a wellness visit  What happens at a wellness visit:  Your healthcare provider will ask about your health, and your family history of health problems  This includes high blood pressure, heart disease, and cancer  He or she will ask if you have symptoms that concern you, if you smoke, and about your mood  You may also be asked about your intake of medicines, supplements, food, and alcohol  Any of the following may be done:  · Your weight  will be checked  Your height may also be checked so your body mass index (BMI) can be calculated  Your BMI shows if you are at a healthy weight  · Your blood pressure  and heart rate will be checked  Your temperature may also be checked  · Blood and urine tests  may be done  Blood tests may be done to check your cholesterol levels  Abnormal cholesterol levels increase your risk for heart disease and stroke  You may also need a blood or urine test to check for diabetes if you are at increased risk  Urine tests may be done to look for signs of an infection or kidney disease  · A physical exam  includes checking your heartbeat and lungs with a stethoscope  Your healthcare provider may also check your skin to look for sun damage  · Screening tests  may be recommended  A screening test is done to check for diseases that may not cause symptoms  The screening tests you may need depend on your age, gender, family history, and lifestyle habits  For example, colorectal screening may be recommended if you are 48years old or older  Screening tests you need if you are a woman:   · A Pap smear  is used to screen for cervical cancer   Pap smears are usually done every 3 to 5 years depending on your age  You may need them more often if you have had abnormal Pap smear test results in the past  Ask your healthcare provider how often you should have a Pap smear  · A mammogram  is an x-ray of your breasts to screen for breast cancer  Experts recommend mammograms every 2 years starting at age 48 years  You may need a mammogram at age 52 years or younger if you have an increased risk for breast cancer  Talk to your healthcare provider about when you should start having mammograms and how often you need them  Vaccines you may need:   · Get an influenza vaccine  every year  The influenza vaccine protects you from the flu  Several types of viruses cause the flu  The viruses change over time, so new vaccines are made each year  · Get a tetanus-diphtheria (Td) booster vaccine  every 10 years  This vaccine protects you against tetanus and diphtheria  Tetanus is a severe infection that may cause painful muscle spasms and lockjaw  Diphtheria is a severe bacterial infection that causes a thick covering in the back of your mouth and throat  · Get a human papillomavirus (HPV) vaccine  if you are female and aged 23 to 32 or male 23 to 24 and never received it  This vaccine protects you from HPV infection  HPV is the most common infection spread by sexual contact  HPV may also cause vaginal, penile, and anal cancers  · Get a pneumococcal vaccine  if you are aged 72 years or older  The pneumococcal vaccine is an injection given to protect you from pneumococcal disease  Pneumococcal disease is an infection caused by pneumococcal bacteria  The infection may cause pneumonia, meningitis, or an ear infection  · Get a shingles vaccine  if you are 60 or older, even if you have had shingles before  The shingles vaccine is an injection to protect you from the varicella-zoster virus  This is the same virus that causes chickenpox   Shingles is a painful rash that develops in people who had chickenpox or have been exposed to the virus  How to eat healthy:  My Plate is a model for planning healthy meals  It shows the types and amounts of foods that should go on your plate  Fruits and vegetables make up about half of your plate, and grains and protein make up the other half  A serving of dairy is included on the side of your plate  The amount of calories and serving sizes you need depends on your age, gender, weight, and height  Examples of healthy foods are listed below:  · Eat a variety of vegetables  such as dark green, red, and orange vegetables  You can also include canned vegetables low in sodium (salt) and frozen vegetables without added butter or sauces  · Eat a variety of fresh fruits , canned fruit in 100% juice, frozen fruit, and dried fruit  · Include whole grains  At least half of the grains you eat should be whole grains  Examples include whole-wheat bread, wheat pasta, brown rice, and whole-grain cereals such as oatmeal     · Eat a variety of protein foods such as seafood (fish and shellfish), lean meat, and poultry without skin (turkey and chicken)  Examples of lean meats include pork leg, shoulder, or tenderloin, and beef round, sirloin, tenderloin, and extra lean ground beef  Other protein foods include eggs and egg substitutes, beans, peas, soy products, nuts, and seeds  · Choose low-fat dairy products such as skim or 1% milk or low-fat yogurt, cheese, and cottage cheese  · Limit unhealthy fats  such as butter, hard margarine, and shortening  Exercise:  Exercise at least 30 minutes per day on most days of the week  Some examples of exercise include walking, biking, dancing, and swimming  You can also fit in more physical activity by taking the stairs instead of the elevator or parking farther away from stores  Include muscle strengthening activities 2 days each week  Regular exercise provides many health benefits   It helps you manage your weight, and decreases your risk for type 2 diabetes, heart disease, stroke, and high blood pressure  Exercise can also help improve your mood  Ask your healthcare provider about the best exercise plan for you  General health and safety guidelines:   · Do not smoke  Nicotine and other chemicals in cigarettes and cigars can cause lung damage  Ask your healthcare provider for information if you currently smoke and need help to quit  E-cigarettes or smokeless tobacco still contain nicotine  Talk to your healthcare provider before you use these products  · Limit alcohol  A drink of alcohol is 12 ounces of beer, 5 ounces of wine, or 1½ ounces of liquor  · Lose weight, if needed  Being overweight increases your risk of certain health conditions  These include heart disease, high blood pressure, type 2 diabetes, and certain types of cancer  · Protect your skin  Do not sunbathe or use tanning beds  Use sunscreen with a SPF 15 or higher  Apply sunscreen at least 15 minutes before you go outside  Reapply sunscreen every 2 hours  Wear protective clothing, hats, and sunglasses when you are outside  · Drive safely  Always wear your seatbelt  Make sure everyone in your car wears a seatbelt  A seatbelt can save your life if you are in an accident  Do not use your cell phone when you are driving  This could distract you and cause an accident  Pull over if you need to make a call or send a text message  · Practice safe sex  Use latex condoms if are sexually active and have more than one partner  Your healthcare provider may recommend screening tests for sexually transmitted infections (STIs)  · Wear helmets, lifejackets, and protective gear  Always wear a helmet when you ride a bike or motorcycle, go skiing, or play sports that could cause a head injury  Wear protective equipment when you play sports  Wear a lifejacket when you are on a boat or doing water sports      © Copyright [employer] 2021 Information is for End User's use only and may not be sold, redistributed or otherwise used for commercial purposes  All illustrations and images included in CareNotes® are the copyrighted property of [employer] M [employer]  or [employer]  The above information is an  only  It is not intended as medical advice for individual conditions or treatments  Talk to your doctor, nurse or pharmacist before following any medical regimen to see if it is safe and effective for you  Obesity   AMBULATORY CARE:   Obesity  is when your body mass index (BMI) is greater than 30  Your healthcare provider will use your height and weight to measure your BMI  The risks of obesity include  many health problems, such as injuries or physical disability  You may need tests to check for the following:  · Diabetes    · High blood pressure or high cholesterol    · Heart disease    · Gallbladder or liver disease    · Cancer of the colon, breast, prostate, liver, or kidney    · Sleep apnea    · Arthritis or gout    Seek care immediately if:   · You have a severe headache, confusion, or difficulty speaking  · You have weakness on one side of your body  · You have chest pain, sweating, or shortness of breath  Contact your healthcare provider if:   · You have symptoms of gallbladder or liver disease, such as pain in your upper abdomen  · You have knee or hip pain and discomfort while walking  · You have symptoms of diabetes, such as intense hunger and thirst, and frequent urination  · You have symptoms of sleep apnea, such as snoring or daytime sleepiness  · You have questions or concerns about your condition or care  Treatment for obesity  focuses on helping you lose weight to improve your health  Even a small decrease in BMI can reduce the risk for many health problems  Your healthcare provider will help you set a weight-loss goal   · Lifestyle changes  are the first step in treating obesity   These include making healthy food choices and getting regular physical activity  Your healthcare provider may suggest a weight-loss program that involves coaching, education, and therapy  · Medicine  may help you lose weight when it is used with a healthy diet and physical activity  · Surgery  can help you lose weight if you are very obese and have other health problems  There are several types of weight-loss surgery  Ask your healthcare provider for more information  Be successful losing weight:   · Set small, realistic goals  An example of a small goal is to walk for 20 minutes 5 days a week  Anther goal is to lose 5% of your body weight  · Tell friends, family members, and coworkers about your goals  and ask for their support  Ask a friend to lose weight with you, or join a weight-loss support group  · Identify foods or triggers that may cause you to overeat , and find ways to avoid them  Remove tempting high-calorie foods from your home and workplace  Place a bowl of fresh fruit on your kitchen counter  If stress causes you to eat, then find other ways to cope with stress  · Keep a diary to track what you eat and drink  Also write down how many minutes of physical activity you do each day  Weigh yourself once a week and record it in your diary  Eating changes: You will need to eat 500 to 1,000 fewer calories each day than you currently eat to lose 1 to 2 pounds a week  The following changes will help you cut calories:  · Eat smaller portions  Use small plates, no larger than 9 inches in diameter  Fill your plate half full of fruits and vegetables  Measure your food using measuring cups until you know what a serving size looks like  · Eat 3 meals and 1 or 2 snacks each day  Plan your meals in advance  [employer] and eat at home most of the time  Eat slowly  Do not skip meals  Skipping meals can lead to overeating later in the day  This can make it harder for you to lose weight   Talk with a dietitian to help you make a meal plan and schedule that is right for you  · Eat fruits and vegetables at every meal   They are low in calories and high in fiber, which makes you feel full  Do not add butter, margarine, or cream sauce to vegetables  Use herbs to season steamed vegetables  · Eat less fat and fewer fried foods  Eat more baked or grilled chicken and fish  These protein sources are lower in calories and fat than red meat  Limit fast food  Dress your salads with olive oil and vinegar instead of bottled dressing  · Limit the amount of sugar you eat  Do not drink sugary beverages  Limit alcohol  Activity changes:  Physical activity is good for your body in many ways  It helps you burn calories and build strong muscles  It decreases stress and depression, and improves your mood  It can also help you sleep better  Talk to your healthcare provider before you begin an exercise program   · Exercise for at least 30 minutes 5 days a week  Start slowly  Set aside time each day for physical activity that you enjoy and that is convenient for you  It is best to do both weight training and an activity that increases your heart rate, such as walking, bicycling, or swimming  · Find ways to be more active  Do yard work and housecleaning  Walk up the stairs instead of using elevators  Spend your leisure time going to events that require walking, such as outdoor festivals or fairs  This extra physical activity can help you lose weight and keep it off  Follow up with your healthcare provider as directed: You may need to meet with a dietitian  Write down your questions so you remember to ask them during your visits  © Copyright [employer] 2021 Information is for End User's use only and may not be sold, redistributed or otherwise used for commercial purposes   All illustrations and images included in CareNotes® are the copyrighted property of [employer]  or [employer]   The above information is an  only  It is not intended as medical advice for individual conditions or treatments  Talk to your doctor, nurse or pharmacist before following any medical regimen to see if it is safe and effective for you  Cholesterol and Your Health   AMBULATORY CARE:   Cholesterol  is a waxy, fat-like substance  Your body uses cholesterol to make hormones and new cells, and to protect nerves  Cholesterol is made by your body  It also comes from certain foods you eat, such as meat and dairy products  Your healthcare provider can help you set goals for your cholesterol levels  He or she can help you create a plan to meet your goals  Cholesterol level goals: Your cholesterol level goals depend on your risk for heart disease, your age, and your other health conditions  The following are general guidelines:  · Total cholesterol  includes low-density lipoprotein (LDL), high-density lipoprotein (HDL), and triglyceride levels  The total cholesterol level should be lower than 200 mg/dL and is best at about 150 mg/dL  · LDL cholesterol  is called bad cholesterol  because it forms plaque in your arteries  As plaque builds up, your arteries become narrow, and less blood flows through  When plaque decreases blood flow to your heart, you may have chest pain  If plaque completely blocks an artery that brings blood to your heart, you may have a heart attack  Plaque can break off and form blood clots  Blood clots may block arteries in your brain and cause a stroke  The level should be less than 130 mg/dL and is best at about 100 mg/dL  · HDL cholesterol  is called good cholesterol  because it helps remove LDL cholesterol from your arteries  It does this by attaching to LDL cholesterol and carrying it to your liver  Your liver breaks down LDL cholesterol so your body can get rid of it  High levels of HDL cholesterol can help prevent a heart attack and stroke   Low levels of HDL cholesterol can increase your risk for heart disease, heart attack, and stroke  The level should be 60 mg/dL or higher  · Triglycerides  are a type of fat that store energy from foods you eat  High levels of triglycerides also cause plaque buildup  This can increase your risk for a heart attack or stroke  If your triglyceride level is high, your LDL cholesterol level may also be high  The level should be less than 150 mg/dL  Any of the following can increase your risk for high cholesterol:   · Smoking cigarettes    · Being overweight or obese, or not getting enough exercise    · Drinking large amounts of alcohol    · A medical condition such as hypertension (high blood pressure) or diabetes    · Certain genes passed from your parents to you    · Age older than 65 years    What you need to know about having your cholesterol levels checked: Adults 21to 39years of age should have their cholesterol levels checked every 4 to 6 years  Adults 45 years or older should have their cholesterol checked every 1 to 2 years  You may need your cholesterol checked more often, or at a younger age, if you have risk factors for heart disease  You may also need to have your cholesterol checked more often if you have other health conditions, such as diabetes  Blood tests are used to check cholesterol levels  Blood tests measure your levels of triglycerides, LDL cholesterol, and HDL cholesterol  How healthy fats affect your cholesterol levels:  Healthy fats, also called unsaturated fats, help lower LDL cholesterol and triglyceride levels  Healthy fats include the following:  · Monounsaturated fats  are found in foods such as olive oil, canola oil, avocado, nuts, and olives  · Polyunsaturated fats,  such as omega 3 fats, are found in fish, such as salmon, trout, and tuna  They can also be found in plant foods such as flaxseed, walnuts, and soybeans      How unhealthy fats affect your cholesterol levels:  Unhealthy fats increase LDL cholesterol and triglyceride levels  They are found in foods high in cholesterol, saturated fat, and trans fat:  · Cholesterol  is found in eggs, dairy, and meat  · Saturated fat  is found in butter, cheese, ice cream, whole milk, and coconut oil  Saturated fat is also found in meat, such as sausage, hot dogs, and bologna  · Trans fat  is found in liquid oils and is used in fried and baked foods  Foods that contain trans fats include chips, crackers, muffins, sweet rolls, microwave popcorn, and cookies  Treatment  for high cholesterol will also decrease your risk of heart disease, heart attack, and stroke  Treatment may include any of the following:  · Lifestyle changes  may include food, exercise, weight loss, and quitting smoking  You may also need to decrease the amount of alcohol you drink  Your healthcare provider will want you to start with lifestyle changes  Other treatment may be added if lifestyle changes are not enough  Your healthcare provider may recommend you work with a team to manage hyperlipidemia  The team may include medical experts such as a dietitian, an exercise or physical therapist, and a behavior therapist  Your family members may be included in helping you create lifestyle changes  · Medicines  may be given to lower your LDL cholesterol, triglyceride levels, or total cholesterol level  You may need medicines to lower your cholesterol if any of the following is true:    ? You have a history of stroke, TIA, unstable angina, or a heart attack  ? Your LDL cholesterol level is 190 mg/dL or higher  ? You are age 36 to 76 years, have diabetes or heart disease risk factors, and your LDL cholesterol is 70 mg/dL or higher  · Supplements  include fish oil, red yeast rice, and garlic  Fish oil may help lower your triglyceride and LDL cholesterol levels  It may also increase your HDL cholesterol level  Red yeast rice may help decrease your total cholesterol level and LDL cholesterol level   Garlic may help lower your total cholesterol level  Do not take any supplements without talking to your healthcare provider  Food changes you can make to lower your cholesterol levels:  A dietitian can help you create a healthy eating plan  He or she can show you how to read food labels and choose foods low in saturated fat, trans fats, and cholesterol  · Decrease the total amount of fat you eat  Choose lean meats, fat-free or 1% fat milk, and low-fat dairy products, such as yogurt and cheese  Try to limit or avoid red meats  Limit or do not eat fried foods or baked goods, such as cookies  · Replace unhealthy fats with healthy fats  Cook foods in olive oil or canola oil  Choose soft margarines that are low in saturated fat and trans fat  Seeds, nuts, and avocados are other examples of healthy fats  · Eat foods with omega-3 fats  Examples include salmon, tuna, mackerel, walnuts, and flaxseed  Eat fish 2 times per week  Pregnant women should not eat fish that have high levels of mercury, such as shark, swordfish, and jermey mackerel  · Increase the amount of high-fiber foods you eat  High-fiber foods can help lower your LDL cholesterol  Aim to get between 20 and 30 grams of fiber each day  Fruits and vegetables are high in fiber  Eat at least 5 servings each day  Other high-fiber foods are whole-grain or whole-wheat breads, pastas, or cereals, and brown rice  Eat 3 ounces of whole-grain foods each day  Increase fiber slowly  You may have abdominal discomfort, bloating, and gas if you add fiber to your diet too quickly  · Eat healthy protein foods  Examples include low-fat dairy products, skinless chicken and turkey, fish, and nuts  · Limit foods and drinks that are high in sugar  Your dietitian or healthcare provider can help you create daily limits for high-sugar foods and drinks  The limit may be lower if you have diabetes or another health condition   Limits can also help you lose weight if needed  Lifestyle changes you can make to lower your cholesterol levels:   · Maintain a healthy weight  Ask your healthcare provider what a healthy weight is for you  Ask him or her to help you create a weight loss plan if needed  Weight loss can decrease your total cholesterol and triglyceride levels  Weight loss may also help keep your blood pressure at a healthy level  · Be physically active throughout the day  Physical activity, such as exercise, can help lower your total cholesterol level and maintain a healthy weight  Physical activity can also help increase your HDL cholesterol level  Work with your healthcare provider to create an program that is right for you  Get at least 30 to 40 minutes of moderate physical activity most days of the week  Examples of exercise include brisk walking, swimming, or biking  Also include strength training at least 2 times each week  Your healthcare providers can help you create a physical activity plan  · Do not smoke  Nicotine and other chemicals in cigarettes and cigars can raise your cholesterol levels  Ask your healthcare provider for information if you currently smoke and need help to quit  E-cigarettes or smokeless tobacco still contain nicotine  Talk to your healthcare provider before you use these products  · Limit or do not drink alcohol  Alcohol can increase your triglyceride levels  Ask your healthcare provider before you drink alcohol  Ask how much is okay for you to drink in 24 hours or 1 week  Follow up with your doctor as directed:  Write down your questions so you remember to ask them during your visits  © Copyright [employer] 2021 Information is for End User's use only and may not be sold, redistributed or otherwise used for commercial purposes  All illustrations and images included in CareNotes® are the copyrighted property of [employer]  or [place] [employer]   The above information is an  only   It is not intended as medical advice for individual conditions or treatments  Talk to your doctor, nurse or pharmacist before following any medical regimen to see if it is safe and effective for you

## 2021-09-27 ENCOUNTER — OFFICE VISIT (OUTPATIENT)
Dept: GASTROENTEROLOGY | Facility: CLINIC | Age: 61
End: 2021-09-27
Payer: COMMERCIAL

## 2021-09-27 VITALS
HEIGHT: 64 IN | BODY MASS INDEX: 39.61 KG/M2 | WEIGHT: 232 LBS | SYSTOLIC BLOOD PRESSURE: 138 MMHG | DIASTOLIC BLOOD PRESSURE: 82 MMHG | HEART RATE: 84 BPM

## 2021-09-27 DIAGNOSIS — K62.5 RECTAL BLEEDING: Primary | ICD-10-CM

## 2021-09-27 PROCEDURE — 1036F TOBACCO NON-USER: CPT | Performed by: PHYSICIAN ASSISTANT

## 2021-09-27 PROCEDURE — 3008F BODY MASS INDEX DOCD: CPT | Performed by: PHYSICIAN ASSISTANT

## 2021-09-27 PROCEDURE — 99204 OFFICE O/P NEW MOD 45 MIN: CPT | Performed by: PHYSICIAN ASSISTANT

## 2021-09-27 NOTE — PROGRESS NOTES
[facility] - Outpatient Consultation  Sarah Lott 57 y o  female MRN: [id number]  Encounter: [id number]          ASSESSMENT AND PLAN:      1  Rectal bleeding    Patient presents for an evaluation of recent rectal bleeding  Last colonoscopy in July of 2016 was normal   She reports hard stools at times  Will plan for colonoscopy to investigate  Recommend to add a fiber supplement like Benefiber to regulate her bowel movements   ______________________________________________________________________    HPI:  Patient is a pleasant 57year old female who presents to the office for an evaluation of rectal bleeding  Patient reports she was recently diagnosed with PAF and was initially placed on Xarelto  She experienced rectal bleeding and her cardiologist Dr Lurdes Peralta switched her to Eliquis  She reports she continued to experience rectal bleeding  She reports her cardiologist then stopped the Eliquis and recommended she see GI  She does report some hard stools at times but reports she generally has a bowel movement almost daily  No abdominal pain  No rectal pain  No melena  No unintentional weight loss  No family history of colon cancer  She reports she tolerated her last colonoscopy in 2016 well  REVIEW OF SYSTEMS:    CONSTITUTIONAL: Denies any fever, chills, rigors, and weight loss  HEENT: No earache or tinnitus  Denies hearing loss or visual disturbances  CARDIOVASCULAR: No chest pain or palpitations  RESPIRATORY: Denies any cough, hemoptysis, shortness of breath or dyspnea on exertion  GASTROINTESTINAL: As noted in the History of Present Illness  GENITOURINARY: No problems with urination  Denies any hematuria or dysuria  NEUROLOGIC: No dizziness or vertigo, denies headaches  MUSCULOSKELETAL: Denies any muscle or joint pain  SKIN: Denies skin rashes or itching  ENDOCRINE: Denies excessive thirst  Denies intolerance to heat or cold    PSYCHOSOCIAL: Denies depression or anxiety  Denies any recent memory loss  Historical Information   Past Medical History:   Diagnosis Date    Hypertension     Impaired fasting glucose     Pain      Past Surgical History:   Procedure Laterality Date    HYSTERECTOMY  12/23/2006    OOPHORECTOMY Right     unilateral    OTHER SURGICAL HISTORY      Excision of lesion Genitalia Meron last assessed 11/01/16    IN COLONOSCOPY FLX DX W/COLLJ SPEC WHEN PFRMD N/A 7/21/2016    Procedure: COLONOSCOPY;  Surgeon: Rand Zhong MD;  Location: [facility]; Service: Gastroenterology    TUBAL LIGATION       Social History   Social History     Substance and Sexual Activity   Alcohol Use No     Social History     Substance and Sexual Activity   Drug Use No     Social History     Tobacco Use   Smoking Status Never Smoker   Smokeless Tobacco Never Used   Tobacco Comment    Former     Family History   Problem Relation Age of Onset    COPD Mother     Heart failure Mother     Hyperlipidemia Mother     Hypertension Mother     COPD Father         moderate    Cancer Maternal Aunt         Urinary bladder    Breast cancer Paternal Grandmother 79    No Known Problems Sister     No Known Problems Maternal Aunt        Meds/Allergies       Current Outpatient Medications:     Eliquis 2 5 MG    fluticasone (FLONASE) 50 mcg/act nasal spray    ibuprofen (MOTRIN) 600 mg tablet    loratadine (CLARITIN) 10 mg tablet    losartan (COZAAR) 100 MG tablet    metoprolol succinate (TOPROL-XL) 100 mg 24 hr tablet    METRONIDAZOLE, TOPICAL, 0 75 % LOTN    omeprazole (PriLOSEC) 40 MG capsule    Allergies   Allergen Reactions    Erythromycin     Sulfa Antibiotics     Tetracycline            Objective     Blood pressure 138/82, pulse 84, height 5' 4" (1 626 m), weight 105 kg (232 lb)  Body mass index is 39 82 kg/m²          PHYSICAL EXAM:      General Appearance:   Alert, cooperative, no distress   HEENT:   Normocephalic, atraumatic, anicteric    Neck:  Supple, symmetrical, trachea midline   Lungs:   Clear to auscultation bilaterally; no rales, rhonchi or wheezing; respirations unlabored    Heart[de-identified]   Regular rate and rhythm; no murmur, rub, or gallop  Abdomen:   Soft, non-tender, non-distended; normal bowel sounds; no masses, no organomegaly    Genitalia:   Deferred    Rectal:   Deferred    Extremities:  No cyanosis, clubbing or edema    Pulses:  2+ and symmetric    Skin:  No jaundice, rashes, or lesions    Lymph nodes:  No palpable cervical lymphadenopathy        Lab Results:   No visits with results within 1 Day(s) from this visit     Latest known visit with results is:   Appointment on 07/14/2021   Component Date Value    WBC 07/14/2021 8 49     RBC 07/14/2021 5 11     Hemoglobin 07/14/2021 13 6     Hematocrit 07/14/2021 43 3     MCV 07/14/2021 85     MCH 07/14/2021 26 6*    MCHC 07/14/2021 31 4     RDW 07/14/2021 13 5     MPV 07/14/2021 10 3     Platelets 19/51/5509 248     nRBC 07/14/2021 0     Neutrophils Relative 07/14/2021 49     Immat GRANS % 07/14/2021 1     Lymphocytes Relative 07/14/2021 36     Monocytes Relative 07/14/2021 9     Eosinophils Relative 07/14/2021 4     Basophils Relative 07/14/2021 1     Neutrophils Absolute 07/14/2021 4 19     Immature Grans Absolute 07/14/2021 0 06     Lymphocytes Absolute 07/14/2021 3 09     Monocytes Absolute 07/14/2021 0 76     Eosinophils Absolute 07/14/2021 0 34     Basophils Absolute 07/14/2021 0 05     Sodium 07/14/2021 139     Potassium 07/14/2021 4 2     Chloride 07/14/2021 106     CO2 07/14/2021 28     ANION GAP 07/14/2021 5     BUN 07/14/2021 19     Creatinine 07/14/2021 1 02     Glucose, Fasting 07/14/2021 99     Calcium 07/14/2021 9 1     Corrected Calcium 07/14/2021 9 6     AST 07/14/2021 17     ALT 07/14/2021 29     Alkaline Phosphatase 07/14/2021 111     Total Protein 07/14/2021 7 3     Albumin 07/14/2021 3 4*    Total Bilirubin 07/14/2021 0 31     eGFR 07/14/2021 60     Hemoglobin A1C 07/14/2021 5 8*    EAG 07/14/2021 120     Cholesterol 07/14/2021 163     Triglycerides 07/14/2021 106     HDL, Direct 07/14/2021 42     LDL Calculated 07/14/2021 100     TSH 3RD GENERATON 07/14/2021 2 630          Radiology Results:   No results found

## 2021-10-01 ENCOUNTER — TELEPHONE (OUTPATIENT)
Dept: GASTROENTEROLOGY | Facility: HOSPITAL | Age: 61
End: 2021-10-01

## 2021-10-04 ENCOUNTER — ANESTHESIA EVENT (OUTPATIENT)
Dept: GASTROENTEROLOGY | Facility: HOSPITAL | Age: 61
End: 2021-10-04

## 2021-10-04 ENCOUNTER — ANESTHESIA (OUTPATIENT)
Dept: GASTROENTEROLOGY | Facility: HOSPITAL | Age: 61
End: 2021-10-04

## 2021-10-04 ENCOUNTER — HOSPITAL ENCOUNTER (OUTPATIENT)
Dept: GASTROENTEROLOGY | Facility: HOSPITAL | Age: 61
Setting detail: OUTPATIENT SURGERY
Discharge: HOME | End: 2021-10-04
Attending: INTERNAL MEDICINE
Payer: COMMERCIAL

## 2021-10-04 VITALS
TEMPERATURE: 98.1 F | OXYGEN SATURATION: 98 % | HEART RATE: 85 BPM | BODY MASS INDEX: 37.41 KG/M2 | HEIGHT: 64 IN | WEIGHT: 219.14 LBS | RESPIRATION RATE: 16 BRPM | SYSTOLIC BLOOD PRESSURE: 129 MMHG | DIASTOLIC BLOOD PRESSURE: 71 MMHG

## 2021-10-04 DIAGNOSIS — K62.5 RECTAL BLEEDING: ICD-10-CM

## 2021-10-04 PROBLEM — I48.91 ATRIAL FIBRILLATION (HCC): Status: ACTIVE | Noted: 2021-10-04

## 2021-10-04 PROCEDURE — 88305 TISSUE EXAM BY PATHOLOGIST: CPT | Performed by: PATHOLOGY

## 2021-10-04 PROCEDURE — 45381 COLONOSCOPY SUBMUCOUS NJX: CPT | Performed by: INTERNAL MEDICINE

## 2021-10-04 PROCEDURE — 45385 COLONOSCOPY W/LESION REMOVAL: CPT | Performed by: INTERNAL MEDICINE

## 2021-10-04 RX ORDER — SODIUM CHLORIDE, SODIUM LACTATE, POTASSIUM CHLORIDE, CALCIUM CHLORIDE 600; 310; 30; 20 MG/100ML; MG/100ML; MG/100ML; MG/100ML
125 INJECTION, SOLUTION INTRAVENOUS CONTINUOUS
Status: DISCONTINUED | OUTPATIENT
Start: 2021-10-04 | End: 2021-10-08 | Stop reason: HOSPADM

## 2021-10-04 RX ORDER — SODIUM CHLORIDE, SODIUM LACTATE, POTASSIUM CHLORIDE, CALCIUM CHLORIDE 600; 310; 30; 20 MG/100ML; MG/100ML; MG/100ML; MG/100ML
INJECTION, SOLUTION INTRAVENOUS CONTINUOUS PRN
Status: DISCONTINUED | OUTPATIENT
Start: 2021-10-04 | End: 2021-10-04

## 2021-10-04 RX ORDER — PROPOFOL 10 MG/ML
INJECTION, EMULSION INTRAVENOUS AS NEEDED
Status: DISCONTINUED | OUTPATIENT
Start: 2021-10-04 | End: 2021-10-04

## 2021-10-04 RX ORDER — GLYCOPYRROLATE 0.2 MG/ML
INJECTION INTRAMUSCULAR; INTRAVENOUS AS NEEDED
Status: DISCONTINUED | OUTPATIENT
Start: 2021-10-04 | End: 2021-10-04

## 2021-10-04 RX ADMIN — GLYCOPYRROLATE 0.4 MG: 0.2 INJECTION, SOLUTION INTRAMUSCULAR; INTRAVENOUS at 10:02

## 2021-10-04 RX ADMIN — PROPOFOL 40 MG: 10 INJECTION, EMULSION INTRAVENOUS at 10:10

## 2021-10-04 RX ADMIN — PROPOFOL 40 MG: 10 INJECTION, EMULSION INTRAVENOUS at 10:02

## 2021-10-04 RX ADMIN — SODIUM CHLORIDE, SODIUM LACTATE, POTASSIUM CHLORIDE, AND CALCIUM CHLORIDE: .6; .31; .03; .02 INJECTION, SOLUTION INTRAVENOUS at 09:55

## 2021-10-04 RX ADMIN — PROPOFOL 40 MG: 10 INJECTION, EMULSION INTRAVENOUS at 10:14

## 2021-10-04 RX ADMIN — PROPOFOL 130 MG: 10 INJECTION, EMULSION INTRAVENOUS at 09:58

## 2021-10-04 RX ADMIN — PROPOFOL 40 MG: 10 INJECTION, EMULSION INTRAVENOUS at 10:04

## 2021-10-04 RX ADMIN — SODIUM CHLORIDE, SODIUM LACTATE, POTASSIUM CHLORIDE, AND CALCIUM CHLORIDE 125 ML/HR: .6; .31; .03; .02 INJECTION, SOLUTION INTRAVENOUS at 09:38

## 2021-10-04 RX ADMIN — PROPOFOL 40 MG: 10 INJECTION, EMULSION INTRAVENOUS at 10:07

## 2021-10-19 ENCOUNTER — TELEPHONE (OUTPATIENT)
Dept: GASTROENTEROLOGY | Facility: CLINIC | Age: 61
End: 2021-10-19

## 2021-10-27 ENCOUNTER — IMMUNIZATIONS (OUTPATIENT)
Dept: FAMILY MEDICINE CLINIC | Facility: CLINIC | Age: 61
End: 2021-10-27
Payer: COMMERCIAL

## 2021-10-27 DIAGNOSIS — Z23 ENCOUNTER FOR IMMUNIZATION: Primary | ICD-10-CM

## 2021-10-27 PROCEDURE — 90682 RIV4 VACC RECOMBINANT DNA IM: CPT

## 2021-10-27 PROCEDURE — 90471 IMMUNIZATION ADMIN: CPT

## 2022-01-14 DIAGNOSIS — K21.9 GASTROESOPHAGEAL REFLUX DISEASE: ICD-10-CM

## 2022-01-14 RX ORDER — OMEPRAZOLE 40 MG/1
CAPSULE, DELAYED RELEASE ORAL
Qty: 90 CAPSULE | Refills: 1 | Status: SHIPPED | OUTPATIENT
Start: 2022-01-14 | End: 2022-07-17

## 2022-06-23 ENCOUNTER — HOSPITAL ENCOUNTER (OUTPATIENT)
Dept: MAMMOGRAPHY | Facility: CLINIC | Age: 62
Discharge: HOME | End: 2022-06-23
Payer: COMMERCIAL

## 2022-06-23 VITALS — BODY MASS INDEX: 40.08 KG/M2 | HEIGHT: 62 IN

## 2022-06-23 DIAGNOSIS — Z12.31 ENCOUNTER FOR SCREENING MAMMOGRAM FOR MALIGNANT NEOPLASM OF BREAST: ICD-10-CM

## 2022-06-23 PROCEDURE — 77063 BREAST TOMOSYNTHESIS BI: CPT

## 2022-06-23 PROCEDURE — 77067 SCR MAMMO BI INCL CAD: CPT

## 2022-07-05 DIAGNOSIS — L71.9 ROSACEA: ICD-10-CM

## 2022-07-05 RX ORDER — METRONIDAZOLE 7.5 MG/G
LOTION TOPICAL 2 TIMES DAILY
Qty: 59 ML | Refills: 5 | Status: SHIPPED | OUTPATIENT
Start: 2022-07-05

## 2022-07-17 DIAGNOSIS — K21.9 GASTROESOPHAGEAL REFLUX DISEASE: ICD-10-CM

## 2022-07-17 RX ORDER — OMEPRAZOLE 40 MG/1
CAPSULE, DELAYED RELEASE ORAL
Qty: 90 CAPSULE | Refills: 1 | Status: SHIPPED | OUTPATIENT
Start: 2022-07-17

## 2022-10-06 ENCOUNTER — OFFICE VISIT (OUTPATIENT)
Dept: FAMILY MEDICINE CLINIC | Facility: CLINIC | Age: 62
End: 2022-10-06
Payer: COMMERCIAL

## 2022-10-06 VITALS
TEMPERATURE: 97.6 F | HEIGHT: 62 IN | DIASTOLIC BLOOD PRESSURE: 78 MMHG | SYSTOLIC BLOOD PRESSURE: 128 MMHG | OXYGEN SATURATION: 98 % | BODY MASS INDEX: 41.04 KG/M2 | WEIGHT: 223 LBS | HEART RATE: 72 BPM

## 2022-10-06 DIAGNOSIS — R73.01 IMPAIRED FASTING GLUCOSE: ICD-10-CM

## 2022-10-06 DIAGNOSIS — I10 ESSENTIAL HYPERTENSION: ICD-10-CM

## 2022-10-06 DIAGNOSIS — L71.9 ROSACEA: ICD-10-CM

## 2022-10-06 DIAGNOSIS — L65.9 ALOPECIA: ICD-10-CM

## 2022-10-06 DIAGNOSIS — Z00.00 ANNUAL PHYSICAL EXAM: Primary | ICD-10-CM

## 2022-10-06 DIAGNOSIS — Z23 NEED FOR IMMUNIZATION AGAINST INFLUENZA: ICD-10-CM

## 2022-10-06 DIAGNOSIS — E66.01 MORBID OBESITY (HCC): ICD-10-CM

## 2022-10-06 PROCEDURE — 90682 RIV4 VACC RECOMBINANT DNA IM: CPT

## 2022-10-06 PROCEDURE — 90471 IMMUNIZATION ADMIN: CPT

## 2022-10-06 PROCEDURE — 99396 PREV VISIT EST AGE 40-64: CPT | Performed by: PHYSICIAN ASSISTANT

## 2022-10-06 NOTE — PATIENT INSTRUCTIONS
Wellness Visit for Adults   AMBULATORY CARE:   A wellness visit  is when you see your healthcare provider to get screened for health problems  Your healthcare provider will also give you advice on how to stay healthy  Write down your questions so you remember to ask them  Ask your healthcare provider how often you should have a wellness visit  What happens at a wellness visit:  Your healthcare provider will ask about your health, and your family history of health problems  This includes high blood pressure, heart disease, and cancer  He or she will ask if you have symptoms that concern you, if you smoke, and about your mood  You may also be asked about your intake of medicines, supplements, food, and alcohol  Any of the following may be done:  · Your weight  will be checked  Your height may also be checked so your body mass index (BMI) can be calculated  Your BMI shows if you are at a healthy weight  · Your blood pressure  and heart rate will be checked  Your temperature may also be checked  · Blood and urine tests  may be done  Blood tests may be done to check your cholesterol levels  Abnormal cholesterol levels increase your risk for heart disease and stroke  You may also need a blood or urine test to check for diabetes if you are at increased risk  Urine tests may be done to look for signs of an infection or kidney disease  · A physical exam  includes checking your heartbeat and lungs with a stethoscope  Your healthcare provider may also check your skin to look for sun damage  · Screening tests  may be recommended  A screening test is done to check for diseases that may not cause symptoms  The screening tests you may need depend on your age, gender, family history, and lifestyle habits  For example, colorectal screening may be recommended if you are 48years old or older  Screening tests you need if you are a woman:   · A Pap smear  is used to screen for cervical cancer   Pap smears are usually done every 3 to 5 years depending on your age  You may need them more often if you have had abnormal Pap smear test results in the past  Ask your healthcare provider how often you should have a Pap smear  · A mammogram  is an x-ray of your breasts to screen for breast cancer  Experts recommend mammograms every 2 years starting at age 48 years  You may need a mammogram at age 52 years or younger if you have an increased risk for breast cancer  Talk to your healthcare provider about when you should start having mammograms and how often you need them  Vaccines you may need:   · Get an influenza vaccine  every year  The influenza vaccine protects you from the flu  Several types of viruses cause the flu  The viruses change over time, so new vaccines are made each year  · Get a tetanus-diphtheria (Td) booster vaccine  every 10 years  This vaccine protects you against tetanus and diphtheria  Tetanus is a severe infection that may cause painful muscle spasms and lockjaw  Diphtheria is a severe bacterial infection that causes a thick covering in the back of your mouth and throat  · Get a human papillomavirus (HPV) vaccine  if you are female and aged 23 to 32 or male 23 to 24 and never received it  This vaccine protects you from HPV infection  HPV is the most common infection spread by sexual contact  HPV may also cause vaginal, penile, and anal cancers  · Get a pneumococcal vaccine  if you are aged 72 years or older  The pneumococcal vaccine is an injection given to protect you from pneumococcal disease  Pneumococcal disease is an infection caused by pneumococcal bacteria  The infection may cause pneumonia, meningitis, or an ear infection  · Get a shingles vaccine  if you are 60 or older, even if you have had shingles before  The shingles vaccine is an injection to protect you from the varicella-zoster virus  This is the same virus that causes chickenpox   Shingles is a painful rash that develops in people who had chickenpox or have been exposed to the virus  How to eat healthy:  My Plate is a model for planning healthy meals  It shows the types and amounts of foods that should go on your plate  Fruits and vegetables make up about half of your plate, and grains and protein make up the other half  A serving of dairy is included on the side of your plate  The amount of calories and serving sizes you need depends on your age, gender, weight, and height  Examples of healthy foods are listed below:  · Eat a variety of vegetables  such as dark green, red, and orange vegetables  You can also include canned vegetables low in sodium (salt) and frozen vegetables without added butter or sauces  · Eat a variety of fresh fruits , canned fruit in 100% juice, frozen fruit, and dried fruit  · Include whole grains  At least half of the grains you eat should be whole grains  Examples include whole-wheat bread, wheat pasta, brown rice, and whole-grain cereals such as oatmeal     · Eat a variety of protein foods such as seafood (fish and shellfish), lean meat, and poultry without skin (turkey and chicken)  Examples of lean meats include pork leg, shoulder, or tenderloin, and beef round, sirloin, tenderloin, and extra lean ground beef  Other protein foods include eggs and egg substitutes, beans, peas, soy products, nuts, and seeds  · Choose low-fat dairy products such as skim or 1% milk or low-fat yogurt, cheese, and cottage cheese  · Limit unhealthy fats  such as butter, hard margarine, and shortening  Exercise:  Exercise at least 30 minutes per day on most days of the week  Some examples of exercise include walking, biking, dancing, and swimming  You can also fit in more physical activity by taking the stairs instead of the elevator or parking farther away from stores  Include muscle strengthening activities 2 days each week  Regular exercise provides many health benefits   It helps you manage your weight, and decreases your risk for type 2 diabetes, heart disease, stroke, and high blood pressure  Exercise can also help improve your mood  Ask your healthcare provider about the best exercise plan for you  General health and safety guidelines:   · Do not smoke  Nicotine and other chemicals in cigarettes and cigars can cause lung damage  Ask your healthcare provider for information if you currently smoke and need help to quit  E-cigarettes or smokeless tobacco still contain nicotine  Talk to your healthcare provider before you use these products  · Limit alcohol  A drink of alcohol is 12 ounces of beer, 5 ounces of wine, or 1½ ounces of liquor  · Lose weight, if needed  Being overweight increases your risk of certain health conditions  These include heart disease, high blood pressure, type 2 diabetes, and certain types of cancer  · Protect your skin  Do not sunbathe or use tanning beds  Use sunscreen with a SPF 15 or higher  Apply sunscreen at least 15 minutes before you go outside  Reapply sunscreen every 2 hours  Wear protective clothing, hats, and sunglasses when you are outside  · Drive safely  Always wear your seatbelt  Make sure everyone in your car wears a seatbelt  A seatbelt can save your life if you are in an accident  Do not use your cell phone when you are driving  This could distract you and cause an accident  Pull over if you need to make a call or send a text message  · Practice safe sex  Use latex condoms if are sexually active and have more than one partner  Your healthcare provider may recommend screening tests for sexually transmitted infections (STIs)  · Wear helmets, lifejackets, and protective gear  Always wear a helmet when you ride a bike or motorcycle, go skiing, or play sports that could cause a head injury  Wear protective equipment when you play sports  Wear a lifejacket when you are on a boat or doing water sports      © Copyright [employer] 2022 Information is for End User's use only and may not be sold, redistributed or otherwise used for commercial purposes  All illustrations and images included in CareNotes® are the copyrighted property of [employer]  or [place] [employer]  The above information is an  only  It is not intended as medical advice for individual conditions or treatments  Talk to your doctor, nurse or pharmacist before following any medical regimen to see if it is safe and effective for you  Obesity   AMBULATORY CARE:   Obesity  means your body mass index (BMI) is greater than 30  Your healthcare provider will use your height and weight to measure your BMI  The risks of obesity include  many health problems, including injuries or physical disability  · Diabetes (high blood sugar level)    · High blood pressure or high cholesterol    · Heart disease    · Stroke    · Gallbladder or liver disease    · Cancer of the colon, breast, prostate, liver, or kidney    · Sleep apnea    · Arthritis or gout    Screening  is done to check for health conditions before you have signs or symptoms  If you are 28to 79years old, your blood sugar level may be checked every 3 years for signs of prediabetes or diabetes  Your healthcare provider will check your blood pressure at each visit  High blood pressure can lead to a stroke or other problems  Your provider may check for signs of heart disease, cancer, or other health problems  Seek care immediately if:   · You have a severe headache, confusion, or difficulty speaking  · You have weakness on one side of your body  · You have chest pain, sweating, or shortness of breath  Call your doctor if:   · You have symptoms of gallbladder or liver disease, such as pain in your upper abdomen  · You have knee or hip pain and discomfort while walking  · You have symptoms of diabetes, such as intense hunger and thirst, and frequent urination      · You have symptoms of sleep apnea, such as snoring or daytime sleepiness  · You have questions or concerns about your condition or care  Treatment for obesity  focuses on helping you lose weight to improve your health  Even a small decrease in BMI can reduce the risk for many health problems  Your healthcare provider will help you set a weight-loss goal   · Lifestyle changes  are the first step in treating obesity  These include making healthy food choices and getting regular physical activity  Your healthcare provider may suggest a weight-loss program that involves coaching, education, and therapy  · Medicine  may help you lose weight when it is used with a healthy foods and physical activity  · Surgery  can help you lose weight if you are very obese and have other health problems  There are several types of weight-loss surgery  Ask your healthcare provider for more information  Tips for safe weight loss:   · Set small, realistic goals  An example of a small goal is to walk for 20 minutes 5 days a week  Anther goal is to lose 5% of your body weight  · Tell friends, family members, and coworkers about your goals  and ask for their support  Ask a friend to lose weight with you, or join a weight-loss support group  · Identify foods or triggers that may cause you to overeat , and find ways to avoid them  Remove tempting high-calorie foods from your home and workplace  Place a bowl of fresh fruit on your kitchen counter  If stress causes you to eat, then find other ways to cope with stress  A counselor or therapist may be able to help you  · Keep a diary to track what you eat and drink  Also write down how many minutes of physical activity you do each day  Weigh yourself once a week and record it in your diary  Eating changes: You will need to eat 500 to 1,000 fewer calories each day than you currently eat to lose 1 to 2 pounds a week  The following changes will help you cut calories:  · Eat smaller portions    Use small plates, no larger than 9 inches in diameter  Fill your plate half full of fruits and vegetables  Measure your food using measuring cups until you know what a serving size looks like  · Eat 3 meals and 1 or 2 snacks each day  Plan your meals in advance  [employer] and eat at home most of the time  Eat slowly  Do not skip meals  Skipping meals can lead to overeating later in the day  This can make it harder for you to lose weight  Talk with a dietitian to help you make a meal plan and schedule that is right for you  · Eat fruits and vegetables at every meal   They are low in calories and high in fiber, which makes you feel full  Do not add butter, margarine, or cream sauce to vegetables  Use herbs to season steamed vegetables  · Eat less fat and fewer fried foods  Eat more baked or grilled chicken and fish  These protein sources are lower in calories and fat than red meat  Limit fast food  Dress your salads with olive oil and vinegar instead of bottled dressing  · Limit the amount of sugar you eat  Do not drink sugary beverages  Limit alcohol  Activity changes:  Physical activity is good for your body in many ways  It helps you burn calories and build strong muscles  It decreases stress and depression, and improves your mood  It can also help you sleep better  Talk to your healthcare provider before you begin an exercise program   · Exercise for at least 30 minutes 5 days a week  Start slowly  Set aside time each day for physical activity that you enjoy and that is convenient for you  It is best to do both weight training and an activity that increases your heart rate, such as walking, bicycling, or swimming  · Find ways to be more active  Do yard work and housecleaning  Walk up the stairs instead of using elevators  Spend your leisure time going to events that require walking, such as outdoor festivals or fairs  This extra physical activity can help you lose weight and keep it off         Follow up with your doctor as directed: You may need to meet with a dietitian  Write down your questions so you remember to ask them during your visits  © Copyright [employer] 2022 Information is for End User's use only and may not be sold, redistributed or otherwise used for commercial purposes  All illustrations and images included in CareNotes® are the copyrighted property of [employer]  or [place] Swartz   The above information is an  only  It is not intended as medical advice for individual conditions or treatments  Talk to your doctor, nurse or pharmacist before following any medical regimen to see if it is safe and effective for you  Cholesterol and Your Health   AMBULATORY CARE:   Cholesterol  is a waxy, fat-like substance  Your body uses cholesterol to make hormones and new cells, and to protect nerves  Cholesterol is made by your body  It also comes from certain foods you eat, such as meat and dairy products  Your healthcare provider can help you set goals for your cholesterol levels  He or she can help you create a plan to meet your goals  Cholesterol level goals: Your cholesterol level goals depend on your risk for heart disease, your age, and your other health conditions  The following are general guidelines:  · Total cholesterol  includes low-density lipoprotein (LDL), high-density lipoprotein (HDL), and triglyceride levels  The total cholesterol level should be lower than 200 mg/dL and is best at about 150 mg/dL  · LDL cholesterol  is called bad cholesterol  because it forms plaque in your arteries  As plaque builds up, your arteries become narrow, and less blood flows through  When plaque decreases blood flow to your heart, you may have chest pain  If plaque completely blocks an artery that brings blood to your heart, you may have a heart attack  Plaque can break off and form blood clots  Blood clots may block arteries in your brain and cause a stroke   The level should be less than 130 mg/dL and is best at about 100 mg/dL  · HDL cholesterol  is called good cholesterol  because it helps remove LDL cholesterol from your arteries  It does this by attaching to LDL cholesterol and carrying it to your liver  Your liver breaks down LDL cholesterol so your body can get rid of it  High levels of HDL cholesterol can help prevent a heart attack and stroke  Low levels of HDL cholesterol can increase your risk for heart disease, heart attack, and stroke  The level should be 60 mg/dL or higher  · Triglycerides  are a type of fat that store energy from foods you eat  High levels of triglycerides also cause plaque buildup  This can increase your risk for a heart attack or stroke  If your triglyceride level is high, your LDL cholesterol level may also be high  The level should be less than 150 mg/dL  Any of the following can increase your risk for high cholesterol:   · Smoking cigarettes    · Being overweight or obese, or not getting enough exercise    · Drinking large amounts of alcohol    · A medical condition such as hypertension (high blood pressure) or diabetes    · Certain genes passed from your parents to you    · Age older than 65 years    What you need to know about having your cholesterol levels checked: Adults 21to 39years of age should have their cholesterol levels checked every 4 to 6 years  Adults 45 years or older should have their cholesterol checked every 1 to 2 years  You may need your cholesterol checked more often, or at a younger age, if you have risk factors for heart disease  You may also need to have your cholesterol checked more often if you have other health conditions, such as diabetes  Blood tests are used to check cholesterol levels  Blood tests measure your levels of triglycerides, LDL cholesterol, and HDL cholesterol  How healthy fats affect your cholesterol levels:  Healthy fats, also called unsaturated fats, help lower LDL cholesterol and triglyceride levels  Healthy fats include the following:  · Monounsaturated fats  are found in foods such as olive oil, canola oil, avocado, nuts, and olives  · Polyunsaturated fats,  such as omega 3 fats, are found in fish, such as salmon, trout, and tuna  They can also be found in plant foods such as flaxseed, walnuts, and soybeans  How unhealthy fats affect your cholesterol levels:  Unhealthy fats increase LDL cholesterol and triglyceride levels  They are found in foods high in cholesterol, saturated fat, and trans fat:  · Cholesterol  is found in eggs, dairy, and meat  · Saturated fat  is found in butter, cheese, ice cream, whole milk, and coconut oil  Saturated fat is also found in meat, such as sausage, hot dogs, and bologna  · Trans fat  is found in liquid oils and is used in fried and baked foods  Foods that contain trans fats include chips, crackers, muffins, sweet rolls, microwave popcorn, and cookies  Treatment  for high cholesterol will also decrease your risk of heart disease, heart attack, and stroke  Treatment may include any of the following:  · Lifestyle changes  may include food, exercise, weight loss, and quitting smoking  You may also need to decrease the amount of alcohol you drink  Your healthcare provider will want you to start with lifestyle changes  Other treatment may be added if lifestyle changes are not enough  Your healthcare provider may recommend you work with a team to manage hyperlipidemia  The team may include medical experts such as a dietitian, an exercise or physical therapist, and a behavior therapist  Your family members may be included in helping you create lifestyle changes  · Medicines  may be given to lower your LDL cholesterol, triglyceride levels, or total cholesterol level  You may need medicines to lower your cholesterol if any of the following is true:    ? You have a history of stroke, TIA, unstable angina, or a heart attack  ?  Your LDL cholesterol level is 190 mg/dL or higher  ? You are age 36 to 76 years, have diabetes or heart disease risk factors, and your LDL cholesterol is 70 mg/dL or higher  · Supplements  include fish oil, red yeast rice, and garlic  Fish oil may help lower your triglyceride and LDL cholesterol levels  It may also increase your HDL cholesterol level  Red yeast rice may help decrease your total cholesterol level and LDL cholesterol level  Garlic may help lower your total cholesterol level  Do not take any supplements without talking to your healthcare provider  Food changes you can make to lower your cholesterol levels:  A dietitian can help you create a healthy eating plan  He or she can show you how to read food labels and choose foods low in saturated fat, trans fats, and cholesterol  · Decrease the total amount of fat you eat  Choose lean meats, fat-free or 1% fat milk, and low-fat dairy products, such as yogurt and cheese  Try to limit or avoid red meats  Limit or do not eat fried foods or baked goods, such as cookies  · Replace unhealthy fats with healthy fats  Cook foods in olive oil or canola oil  Choose soft margarines that are low in saturated fat and trans fat  Seeds, nuts, and avocados are other examples of healthy fats  · Eat foods with omega-3 fats  Examples include salmon, tuna, mackerel, walnuts, and flaxseed  Eat fish 2 times per week  Pregnant women should not eat fish that have high levels of mercury, such as shark, swordfish, and jeremy mackerel  · Increase the amount of high-fiber foods you eat  High-fiber foods can help lower your LDL cholesterol  Aim to get between 20 and 30 grams of fiber each day  Fruits and vegetables are high in fiber  Eat at least 5 servings each day  Other high-fiber foods are whole-grain or whole-wheat breads, pastas, or cereals, and brown rice  Eat 3 ounces of whole-grain foods each day  Increase fiber slowly   You may have abdominal discomfort, bloating, and gas if you add fiber to your diet too quickly  · Eat healthy protein foods  Examples include low-fat dairy products, skinless chicken and turkey, fish, and nuts  · Limit foods and drinks that are high in sugar  Your dietitian or healthcare provider can help you create daily limits for high-sugar foods and drinks  The limit may be lower if you have diabetes or another health condition  Limits can also help you lose weight if needed  Lifestyle changes you can make to lower your cholesterol levels:   · Maintain a healthy weight  Ask your healthcare provider what a healthy weight is for you  Ask him or her to help you create a weight loss plan if needed  Weight loss can decrease your total cholesterol and triglyceride levels  Weight loss may also help keep your blood pressure at a healthy level  · Be physically active throughout the day  Physical activity, such as exercise, can help lower your total cholesterol level and maintain a healthy weight  Physical activity can also help increase your HDL cholesterol level  Work with your healthcare provider to create an program that is right for you  Get at least 30 to 40 minutes of moderate physical activity most days of the week  Examples of exercise include brisk walking, swimming, or biking  Also include strength training at least 2 times each week  Your healthcare providers can help you create a physical activity plan  · Do not smoke  Nicotine and other chemicals in cigarettes and cigars can raise your cholesterol levels  Ask your healthcare provider for information if you currently smoke and need help to quit  E-cigarettes or smokeless tobacco still contain nicotine  Talk to your healthcare provider before you use these products  · Limit or do not drink alcohol  Alcohol can increase your triglyceride levels  Ask your healthcare provider before you drink alcohol  Ask how much is okay for you to drink in 24 hours or 1 week      Follow up with your doctor as directed:  Write down your questions so you remember to ask them during your visits  © Copyright [employer] 2022 Information is for End User's use only and may not be sold, redistributed or otherwise used for commercial purposes  All illustrations and images included in CareNotes® are the copyrighted property of [employer]  or [place] [employer]  The above information is an  only  It is not intended as medical advice for individual conditions or treatments  Talk to your doctor, nurse or pharmacist before following any medical regimen to see if it is safe and effective for you

## 2022-10-06 NOTE — PROGRESS NOTES
[id number]    NAME: Kenya Zamarripa  AGE: 60 y o  SEX: female  : 1960     DATE: 10/6/2022     Assessment and Plan:     Problem List Items Addressed This Visit        Endocrine    Impaired fasting glucose    Relevant Orders    Hemoglobin A1C       Cardiovascular and Mediastinum    Essential hypertension    Relevant Orders    CBC and differential    Comprehensive metabolic panel    Microalbumin / creatinine urine ratio       Musculoskeletal and Integument    Rosacea    Relevant Orders    Ambulatory Referral to Dermatology    Alopecia    Relevant Orders    Ambulatory Referral to Dermatology       Other    Morbid obesity ([address] )    Relevant Orders    Lipid Panel with Direct LDL reflex    TSH, 3rd generation with Free T4 reflex    Annual physical exam - Primary      Other Visit Diagnoses     Need for immunization against influenza        Relevant Orders    influenza vaccine, quadrivalent, recombinant, PF, 0 5 mL, for patients 18 yr+ (FLUBLOK)          Immunizations and preventive care screenings were discussed with patient today  Appropriate education was printed on patient's after visit summary  Counseling:  Dental Health: discussed importance of regular tooth brushing, flossing, and dental visits  Injury prevention: discussed safety/seat belts, safety helmets, smoke detectors, carbon dioxide detectors, and smoking near bedding or upholstery  · Exercise: the importance of regular exercise/physical activity was discussed  Recommend exercise 3-5 times per week for at least 30 minutes  BMI Counseling: Body mass index is 40 79 kg/m²   The BMI is above normal  Nutrition recommendations include encouraging healthy choices of fruits and vegetables, consuming healthier snacks, limiting drinks that contain sugar, moderation in carbohydrate intake, increasing intake of lean protein, reducing intake of saturated and trans fat and reducing intake of cholesterol  Exercise recommendations include exercising 3-5 times per week  No pharmacotherapy was ordered  Rationale for BMI follow-up plan is due to patient being overweight or obese  Depression Screening and Follow-up Plan: Patient was screened for depression during today's encounter  They screened negative with a PHQ-2 score of 0  Return in 6 months (on 4/6/2023)  Chief Complaint:     Chief Complaint   Patient presents with    Annual Exam     Annual PE    Earache     Right sided earache which onset 1 week ago   Hair/Scalp Problem     Pt has been experiencing hair loss, and would like to know if there is anything she can do about it  History of Present Illness:     Adult Annual Physical   Patient here for a comprehensive physical exam  The patient reports no problems  Diet and Physical Activity  · Diet/Nutrition: well balanced diet  · Exercise: no formal exercise  Depression Screening  PHQ-2/9 Depression Screening    Little interest or pleasure in doing things: 0 - not at all  Feeling down, depressed, or hopeless: 0 - not at all  PHQ-2 Score: 0  PHQ-2 Interpretation: Negative depression screen       General Health  · Sleep: sleeps poorly  · Hearing: normal - bilateral   · Vision: goes for regular eye exams and wears glasses  · Dental: regular dental visits  /GYN Health  · Patient is: postmenopausal  · Last menstrual period: NA  · Contraceptive method: postmenopausal      Review of Systems:     Review of Systems   Constitutional: Negative for appetite change, fatigue, fever and unexpected weight change  HENT: Negative for dental problem, ear pain, hearing loss, mouth sores, nosebleeds, rhinorrhea and trouble swallowing  Eyes: Negative for photophobia, pain, discharge and visual disturbance  Respiratory: Negative for cough, chest tightness, shortness of breath and wheezing      Cardiovascular: Negative for chest pain and palpitations  Gastrointestinal: Negative for abdominal pain, blood in stool, constipation, diarrhea, nausea, rectal pain and vomiting  Endocrine: Negative for cold intolerance, polydipsia, polyphagia and polyuria  Genitourinary: Negative for decreased urine volume, difficulty urinating, dysuria, enuresis, frequency, hematuria, urgency and vaginal bleeding  Musculoskeletal: Positive for back pain  Negative for arthralgias, gait problem, joint swelling, myalgias, neck pain and neck stiffness  Skin: Negative for color change and rash  Hair loss and rosacea   Allergic/Immunologic: Negative for environmental allergies, food allergies and immunocompromised state  Neurological: Negative for dizziness, seizures, speech difficulty, light-headedness and headaches  Hematological: Negative for adenopathy  Does not bruise/bleed easily  Psychiatric/Behavioral: Positive for sleep disturbance  Negative for behavioral problems, confusion, decreased concentration and self-injury  The patient is not nervous/anxious and is not hyperactive  Past Medical History:     Past Medical History:   Diagnosis Date    Atrial fibrillation ([facility] ) 2021    Hypertension     Impaired fasting glucose     Pain       Past Surgical History:     Past Surgical History:   Procedure Laterality Date    HYSTERECTOMY  12/23/2006    OOPHORECTOMY Right     unilateral    OTHER SURGICAL HISTORY      Excision of lesion Genitalia Bengin last assessed 11/01/16    [place] COLONOSCOPY FLX DX W/COLLJ SPEC WHEN PFRMD N/A 7/21/2016    Procedure: COLONOSCOPY;  Surgeon: Stephie Thompson MD;  [facility];   Service: Gastroenterology    TUBAL LIGATION        Social History:     Social History     Socioeconomic History    Marital status: Single     Spouse name: None    Number of children: None    Years of education: None    Highest education level: None   Occupational History    Occupation: Neuro ass    Tobacco Use  Smoking status: Never Smoker    Smokeless tobacco: Never Used    Tobacco comment: Former   Vaping Use    Vaping Use: Never used   Substance and Sexual Activity    Alcohol use: No    Drug use: No    Sexual activity: Not Currently   Other Topics Concern    None   Social History Narrative        No caffeine use    Seeing a dentist     Social Determinants of Health     Financial Resource Strain: Not on file   Food Insecurity: Not on file   Transportation Needs: Not on file   Physical Activity: Not on file   Stress: Not on file   Social Connections: Not on file   Intimate Partner Violence: Not on file   Housing Stability: Not on file      Family History:     Family History   Problem Relation Age of Onset    COPD Mother     Heart failure Mother     Hyperlipidemia Mother     Hypertension Mother     COPD Father         moderate    No Known Problems Sister     No Known Problems Maternal Grandmother     Breast cancer Paternal Grandmother 79    Cancer Maternal Aunt         Urinary bladder    No Known Problems Maternal Aunt       Current Medications:     Current Outpatient Medications   Medication Sig Dispense Refill    Eliquis 2 5 MG Take 2 5 mg by mouth 2 (two) times a day      fluticasone (FLONASE) 50 mcg/act nasal spray SPRAY 2 SPRAYS INTO EACH NOSTRIL EVERY DAY 48 mL 0    loratadine (CLARITIN) 10 mg tablet Take 1 tablet (10 mg total) by mouth daily 30 tablet 0    losartan (COZAAR) 100 MG tablet Take 100 mg by mouth daily      metoprolol succinate (TOPROL-XL) 100 mg 24 hr tablet TAKE 1 TABLET BY MOUTH EVERY DAY 90 tablet 1    METRONIDAZOLE, TOPICAL, 0 75 % LOTN Apply topically 2 (two) times a day 59 mL 5    omeprazole (PriLOSEC) 40 MG capsule TAKE 1 CAPSULE BY MOUTH EVERY DAY 90 capsule 1     No current facility-administered medications for this visit  Allergies:      Allergies   Allergen Reactions    Erythromycin     Sulfa Antibiotics     Tetracycline       Physical Exam:     BP 128/78 (BP Location: Left arm, Patient Position: Sitting, Cuff Size: Large)   Pulse 72   Temp 97 6 °F (36 4 °C) (Tympanic)   Ht 5' 2" (1 575 m)   Wt 101 kg (223 lb)   SpO2 98%   BMI 40 79 kg/m²     Physical Exam  Vitals and nursing note reviewed  Constitutional:       Appearance: Normal appearance  She is obese  HENT:      Head: Normocephalic and atraumatic  Right Ear: Ear canal and external ear normal  There is impacted cerumen  Left Ear: Ear canal and external ear normal  There is impacted cerumen  Nose: Nose normal       Mouth/Throat:      Mouth: Mucous membranes are moist       Pharynx: Oropharynx is clear  Eyes:      Extraocular Movements: Extraocular movements intact  Conjunctiva/sclera: Conjunctivae normal    Neck:      Thyroid: No thyromegaly  Vascular: No carotid bruit  Cardiovascular:      Rate and Rhythm: Normal rate and regular rhythm  Pulses: Normal pulses  Heart sounds: Normal heart sounds  Pulmonary:      Effort: Pulmonary effort is normal       Breath sounds: Normal breath sounds  Abdominal:      General: Abdomen is flat  Bowel sounds are normal       Palpations: Abdomen is soft  There is no hepatomegaly, splenomegaly or mass  Tenderness: There is no abdominal tenderness  There is no right CVA tenderness or left CVA tenderness  Musculoskeletal:         General: Normal range of motion  Cervical back: Normal range of motion and neck supple  Right lower leg: No edema  Left lower leg: No edema  Lymphadenopathy:      Cervical: No cervical adenopathy  Skin:     General: Skin is warm and dry  Neurological:      General: No focal deficit present  Mental Status: She is alert and oriented to person, place, and time  Psychiatric:         Mood and Affect: Mood normal          Behavior: Behavior normal          Thought Content:  Thought content normal          Judgment: Judgment normal           HAYLEY Torres [address] [place] [address] [address]

## 2022-12-29 ENCOUNTER — APPOINTMENT (OUTPATIENT)
Dept: LAB | Facility: CLINIC | Age: 62
End: 2022-12-29

## 2022-12-29 DIAGNOSIS — E66.01 MORBID OBESITY (HCC): ICD-10-CM

## 2022-12-29 DIAGNOSIS — R73.01 IMPAIRED FASTING GLUCOSE: ICD-10-CM

## 2022-12-29 DIAGNOSIS — I10 ESSENTIAL HYPERTENSION: ICD-10-CM

## 2022-12-29 LAB
ALBUMIN SERPL BCP-MCNC: 3.5 G/DL (ref 3.5–5)
ALP SERPL-CCNC: 106 U/L (ref 46–116)
ALT SERPL W P-5'-P-CCNC: 30 U/L (ref 12–78)
ANION GAP SERPL CALCULATED.3IONS-SCNC: 4 MMOL/L (ref 4–13)
AST SERPL W P-5'-P-CCNC: 20 U/L (ref 5–45)
BASOPHILS # BLD AUTO: 0.03 THOUSANDS/ÂΜL (ref 0–0.1)
BASOPHILS NFR BLD AUTO: 0 % (ref 0–1)
BILIRUB SERPL-MCNC: 0.44 MG/DL (ref 0.2–1)
BUN SERPL-MCNC: 16 MG/DL (ref 5–25)
CALCIUM SERPL-MCNC: 9.2 MG/DL (ref 8.3–10.1)
CHLORIDE SERPL-SCNC: 108 MMOL/L (ref 96–108)
CHOLEST SERPL-MCNC: 153 MG/DL
CO2 SERPL-SCNC: 26 MMOL/L (ref 21–32)
CREAT SERPL-MCNC: 1 MG/DL (ref 0.6–1.3)
CREAT UR-MCNC: 192 MG/DL
EOSINOPHIL # BLD AUTO: 0.3 THOUSAND/ÂΜL (ref 0–0.61)
EOSINOPHIL NFR BLD AUTO: 4 % (ref 0–6)
ERYTHROCYTE [DISTWIDTH] IN BLOOD BY AUTOMATED COUNT: 13.6 % (ref 11.6–15.1)
EST. AVERAGE GLUCOSE BLD GHB EST-MCNC: 120 MG/DL
GFR SERPL CREATININE-BSD FRML MDRD: 60 ML/MIN/1.73SQ M
GLUCOSE P FAST SERPL-MCNC: 92 MG/DL (ref 65–99)
HBA1C MFR BLD: 5.8 %
HCT VFR BLD AUTO: 43 % (ref 34.8–46.1)
HDLC SERPL-MCNC: 40 MG/DL
HGB BLD-MCNC: 13.6 G/DL (ref 11.5–15.4)
IMM GRANULOCYTES # BLD AUTO: 0.03 THOUSAND/UL (ref 0–0.2)
IMM GRANULOCYTES NFR BLD AUTO: 0 % (ref 0–2)
LDLC SERPL CALC-MCNC: 89 MG/DL (ref 0–100)
LYMPHOCYTES # BLD AUTO: 2.96 THOUSANDS/ÂΜL (ref 0.6–4.47)
LYMPHOCYTES NFR BLD AUTO: 36 % (ref 14–44)
MCH RBC QN AUTO: 27 PG (ref 26.8–34.3)
MCHC RBC AUTO-ENTMCNC: 31.6 G/DL (ref 31.4–37.4)
MCV RBC AUTO: 85 FL (ref 82–98)
MICROALBUMIN UR-MCNC: 7.5 MG/L (ref 0–20)
MICROALBUMIN/CREAT 24H UR: 4 MG/G CREATININE (ref 0–30)
MONOCYTES # BLD AUTO: 0.67 THOUSAND/ÂΜL (ref 0.17–1.22)
MONOCYTES NFR BLD AUTO: 8 % (ref 4–12)
NEUTROPHILS # BLD AUTO: 4.23 THOUSANDS/ÂΜL (ref 1.85–7.62)
NEUTS SEG NFR BLD AUTO: 52 % (ref 43–75)
NRBC BLD AUTO-RTO: 0 /100 WBCS
PLATELET # BLD AUTO: 244 THOUSANDS/UL (ref 149–390)
PMV BLD AUTO: 10.5 FL (ref 8.9–12.7)
POTASSIUM SERPL-SCNC: 4.1 MMOL/L (ref 3.5–5.3)
PROT SERPL-MCNC: 7 G/DL (ref 6.4–8.4)
RBC # BLD AUTO: 5.04 MILLION/UL (ref 3.81–5.12)
SODIUM SERPL-SCNC: 138 MMOL/L (ref 135–147)
TRIGL SERPL-MCNC: 122 MG/DL
TSH SERPL DL<=0.05 MIU/L-ACNC: 2.47 UIU/ML (ref 0.45–4.5)
WBC # BLD AUTO: 8.22 THOUSAND/UL (ref 4.31–10.16)

## 2023-01-28 DIAGNOSIS — K21.9 GASTROESOPHAGEAL REFLUX DISEASE: ICD-10-CM

## 2023-01-28 RX ORDER — OMEPRAZOLE 40 MG/1
CAPSULE, DELAYED RELEASE ORAL
Qty: 90 CAPSULE | Refills: 1 | Status: SHIPPED | OUTPATIENT
Start: 2023-01-28

## 2023-03-08 ENCOUNTER — OFFICE VISIT (OUTPATIENT)
Dept: DERMATOLOGY | Facility: CLINIC | Age: 63
End: 2023-03-08

## 2023-03-08 VITALS — HEIGHT: 65 IN | WEIGHT: 220 LBS | BODY MASS INDEX: 36.65 KG/M2

## 2023-03-08 DIAGNOSIS — L82.1 SEBORRHEIC KERATOSIS: ICD-10-CM

## 2023-03-08 DIAGNOSIS — L71.9 ROSACEA: Primary | ICD-10-CM

## 2023-03-08 DIAGNOSIS — Z13.89 SCREENING FOR SKIN CONDITION: ICD-10-CM

## 2023-03-08 DIAGNOSIS — L65.9 ALOPECIA: ICD-10-CM

## 2023-03-08 RX ORDER — RIVAROXABAN 20 MG/1
20 TABLET, FILM COATED ORAL DAILY
COMMUNITY
Start: 2023-02-18

## 2023-03-08 RX ORDER — AZELAIC ACID 0.15 G/G
1 GEL TOPICAL 2 TIMES DAILY
Qty: 50 G | Refills: 4 | Status: SHIPPED | OUTPATIENT
Start: 2023-03-08

## 2023-03-08 NOTE — PROGRESS NOTES
[facility] Note     Patient Name: Anna Foy  Encounter Date: March 8, 2023     Have you been cared for by a [place] [address] Dermatologist in the last 3 years and, if so, which description applies to you? NO  I am considered a "new" patient and must complete all patient intake questions  I am FEMALE/of child-bearing potential     REVIEW OF SYSTEMS:  Have you recently had or currently have any of the following? · Recent fever or chills? No  · Any non-healing wound? No  · Are you pregnant or planning to become pregnant? No  · Are you currently or planning to be nursing or breast feeding? No   PAST MEDICAL HISTORY:  Have you personally ever had or currently have any of the following? If "YES," then please provide more detail  · Skin cancer (such as Melanoma, Basal Cell Carcinoma, Squamous Cell Carcinoma? No  · Tuberculosis, HIV/AIDS, Hepatitis B or C: No  · Systemic Immunosuppression such as Diabetes, Biologic or Immunotherapy, Chemotherapy, Organ Transplantation, Bone Marrow Transplantation No  · Radiation Treatment No   FAMILY HISTORY:  Any "first degree relatives" (parent, brother, sister, or child) with the following? • Skin Cancer, Pancreatic or Other Cancer? No   PATIENT EXPERIENCE:    • Do you want the Dermatologist to perform a COMPLETE skin exam today including a clinical examination under the "bra and underwear" areas? NO  • If necessary, do we have your permission to call and leave a detailed message on your Preferred Phone number that includes your specific medical information?   Yes      Allergies   Allergen Reactions   • Erythromycin    • Sulfa Antibiotics    • Tetracycline       Current Outpatient Medications:   •  Biotin 5000 MCG CAPS, Take 1 capsule by mouth daily, Disp: , Rfl:   •  fluticasone (FLONASE) 50 mcg/act nasal spray, SPRAY 2 SPRAYS INTO EACH NOSTRIL EVERY DAY, Disp: 48 mL, Rfl: 0  •  loratadine (CLARITIN) 10 mg tablet, Take 1 tablet (10 mg total) by mouth daily, Disp: 30 tablet, Rfl: 0  •  losartan (COZAAR) 100 MG tablet, Take 100 mg by mouth daily, Disp: , Rfl:   •  metoprolol succinate (TOPROL-XL) 100 mg 24 hr tablet, TAKE 1 TABLET BY MOUTH EVERY DAY, Disp: 90 tablet, Rfl: 1  •  omeprazole (PriLOSEC) 40 MG capsule, TAKE 1 CAPSULE BY MOUTH EVERY DAY, Disp: 90 capsule, Rfl: 1  •  Xarelto 20 MG tablet, Take 20 mg by mouth daily, Disp: , Rfl:   •  Eliquis 2 5 MG, Take 2 5 mg by mouth 2 (two) times a day, Disp: , Rfl:   •  METRONIDAZOLE, TOPICAL, 0 75 % LOTN, Apply topically 2 (two) times a day (Patient not taking: Reported on 3/8/2023), Disp: 59 mL, Rfl: 5          • Whom besides the patient is providing clinical information about today's encounter?   o NO ADDITIONAL HISTORIAN (patient alone provided history)    Physical Exam and Assessment/Plan by Diagnosis:

## 2023-03-08 NOTE — PROGRESS NOTES
[address]  [address] [facility] [address]  [phone]  [phone]     MRN: [id number] : 1960  Encounter: [id number]  Patient Information: Sindi April  Chief complaint: rosacea and hair loss    History of present illness: 28-year-old female presents for overall skin concerns regarding rosacea and hair loss has been on MetroGel for years stopped it recently and did not really notice any worsening but did not have that much improvement with the MetroGel previous  In addition patient has noted hair loss especially in the last a month  Past Medical History:   Diagnosis Date   • Atrial fibrillation ([address] )    • Hypertension    • Impaired fasting glucose    • Pain      Past Surgical History:   Procedure Laterality Date   • HYSTERECTOMY  2006   • OOPHORECTOMY Right     unilateral   • OTHER SURGICAL HISTORY      Excision of lesion Genitalia Bengin last assessed 16   • [place] COLONOSCOPY FLX DX W/COLLJ SPEC WHEN PFRMD N/A 2016    Procedure: COLONOSCOPY;  Surgeon: Eugene Frye MD;  [facility];   Service: Gastroenterology   • TUBAL LIGATION       Social History   Social History     Substance and Sexual Activity   Alcohol Use No     Social History     Substance and Sexual Activity   Drug Use No     Social History     Tobacco Use   Smoking Status Never   Smokeless Tobacco Never   Tobacco Comments    Former     Family History   Problem Relation Age of Onset   • COPD Mother    • Heart failure Mother    • Hyperlipidemia Mother    • Hypertension Mother    • COPD Father         moderate   • No Known Problems Sister    • No Known Problems Maternal Grandmother    • Breast cancer Paternal Grandmother 79   • Cancer Maternal Aunt         Urinary bladder   • No Known Problems Maternal Aunt      Meds/Allergies   Allergies   Allergen Reactions   • Erythromycin    • Sulfa Antibiotics    • Tetracycline        Meds:  Prior to Admission medications    Medication Sig Start Date End Date Taking?  Authorizing Provider   Biotin 5000 MCG CAPS Take 1 capsule by mouth daily 12/18/22  Yes Historical Provider, MD   fluticasone (FLONASE) 50 mcg/act nasal spray SPRAY 2 SPRAYS INTO EACH NOSTRIL EVERY DAY 12/12/20  Yes Virgen Melendez, HAYLEY   loratadine (CLARITIN) 10 mg tablet Take 1 tablet (10 mg total) by mouth daily 12/21/18  Yes Virgen Later, HAYLEY   losartan (COZAAR) 100 MG tablet Take 100 mg by mouth daily 7/10/21  Yes Historical Provider, MD   metoprolol succinate (TOPROL-XL) 100 mg 24 hr tablet TAKE 1 TABLET BY MOUTH EVERY DAY 3/4/21  Yes Virgen Later, HAYLEY   omeprazole (PriLOSEC) 40 MG capsule TAKE 1 CAPSULE BY MOUTH EVERY DAY 1/28/23  Yes Virgen Later, HAYLEY   Xarelto 20 MG tablet Take 20 mg by mouth daily 2/18/23  Yes Historical Provider, MD   Eliquis 2 5 MG Take 2 5 mg by mouth 2 (two) times a day 7/14/21   Historical Provider, MD   METRONIDAZOLE, TOPICAL, 0 75 % LOTN Apply topically 2 (two) times a day  Patient not taking: Reported on 3/8/2023 7/5/22   Virgen Melendez PA-C       Subjective:     Review of Systems:    General: negative for - chills, fatigue, fever,  weight gain or weight loss  Psychological: negative for - anxiety, behavioral disorder, concentration difficulties, decreased libido, depression, irritability, memory difficulties, mood swings, sleep disturbances or suicidal ideation  ENT: negative for - hearing difficulties , nasal congestion, nasal discharge, oral lesions, sinus pain, sneezing, sore throat  Allergy and Immunology: negative for - hives, insect bite sensitivity,  Hematological and Lymphatic: negative for - bleeding problems, blood clots,bruising, swollen lymph nodes  Endocrine: negative for - hair pattern changes, hot flashes, malaise/lethargy, mood swings, palpitations, polydipsia/polyuria, skin changes, temperature intolerance or unexpected weight change  Respiratory: negative for - cough, hemoptysis, orthopnea, shortness of breath, or wheezing  Cardiovascular: negative for - chest pain, dyspnea on exertion, edema,  Gastrointestinal: negative for - abdominal pain, nausea/vomiting  Genito-Urinary: negative for - dysuria, incontinence, irregular/heavy menses or urinary frequency/urgency  Musculoskeletal: negative for - gait disturbance, joint pain, joint stiffness, joint swelling, muscle pain, muscular weakness  Dermatological:  As in HPI  Neurological: negative for confusion, dizziness, headaches, impaired coordination/balance, memory loss, numbness/tingling, seizures, speech problems, tremors or weakness       Objective:   Ht 5' 4 5" (1 638 m)   Wt 99 8 kg (220 lb)   BMI 37 18 kg/m²     Physical Exam:    General Appearance:    Alert, cooperative, no distress   Head:    Normocephalic, without obvious abnormality, atraumatic           Skin:   A full skin exam was performed including scalp, head scalp, eyes, ears, nose, lips, neck, chest, axilla, abdomen, back, buttocks, bilateral upper extremities, bilateral lower extremities, hands, feet, fingers, toes, fingernails, and toenails erythema papules pustules noted on the face quite diffuse in addition patient also has thinning of hair diffusely present to papules negative hair pull normal keratotic papules greasy stuck on appearance     Assessment:     1  Seborrheic keratosis        2  Rosacea  Ambulatory Referral to Dermatology      3  Alopecia  Ambulatory Referral to Dermatology      4  Screening for skin condition              Plan:   Seborrheic Keratosis  Patient reasurred these are normal growths we acquire with age no treatment needed  Rosacea at present time since she has failed MetroGel we will go ahead and try her on [employer] on a twice daily basis to see if this will help if no improvement needed to consider topical ivermectin or low-dose Doxycycline     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 related to this as a genetic condition we discussed the use of topical minoxidil 5 percent  If no improvement could also consider low-dose oral minoxidil we discussed with her primary care physician before considering  Screening for Dermatologic Disorders: Nothing else of concern noted on complete exam follow up in 1 year      Patricia Flores MD  3/8/2023,9:02 AM    Portions of the record may have been created with voice recognition software   Occasional wrong word or "sound a like" substitutions may have occurred due to the inherent limitations of voice recognition software   Read the chart carefully and recognize, using context, where substitutions have occurred

## 2023-03-16 ENCOUNTER — OFFICE VISIT (OUTPATIENT)
Dept: OBGYN CLINIC | Facility: CLINIC | Age: 63
End: 2023-03-16

## 2023-03-16 ENCOUNTER — APPOINTMENT (OUTPATIENT)
Dept: RADIOLOGY | Facility: CLINIC | Age: 63
End: 2023-03-16

## 2023-03-16 VITALS
DIASTOLIC BLOOD PRESSURE: 82 MMHG | BODY MASS INDEX: 40.15 KG/M2 | WEIGHT: 218.2 LBS | SYSTOLIC BLOOD PRESSURE: 121 MMHG | HEART RATE: 93 BPM | HEIGHT: 62 IN

## 2023-03-16 DIAGNOSIS — M17.12 PRIMARY OSTEOARTHRITIS OF LEFT KNEE: ICD-10-CM

## 2023-03-16 DIAGNOSIS — M17.11 PRIMARY OSTEOARTHRITIS OF RIGHT KNEE: ICD-10-CM

## 2023-03-16 DIAGNOSIS — G89.29 CHRONIC PAIN OF BOTH KNEES: ICD-10-CM

## 2023-03-16 DIAGNOSIS — M25.562 CHRONIC PAIN OF BOTH KNEES: Primary | ICD-10-CM

## 2023-03-16 DIAGNOSIS — M25.561 CHRONIC PAIN OF BOTH KNEES: ICD-10-CM

## 2023-03-16 DIAGNOSIS — M25.562 CHRONIC PAIN OF BOTH KNEES: ICD-10-CM

## 2023-03-16 DIAGNOSIS — M25.561 CHRONIC PAIN OF BOTH KNEES: Primary | ICD-10-CM

## 2023-03-16 DIAGNOSIS — G89.29 CHRONIC PAIN OF BOTH KNEES: Primary | ICD-10-CM

## 2023-03-16 NOTE — PROGRESS NOTES
Patient Name:  Low Michel  MRN:  [id number]    Assessment & Plan     1  Chronic pain of both knees  -     XR knee 4+ vw right injury; Future; Expected date: 03/16/2023  -     XR knee 4+ vw left injury; Future; Expected date: 03/16/2023  -     Ambulatory Referral to Physical Therapy; Future    2  Primary osteoarthritis of left knee  -     Ambulatory Referral to Physical Therapy; Future    3  Primary osteoarthritis of right knee  -     Ambulatory Referral to Physical Therapy; Future        60 y o  female with Bilateral knee osteoarthritis  X-rays reviewed in office today with patient  In depth discussion had with patient regarding continued nonoperative management of Bilateral  knee osteoarthritis including OTC oral analgesics, topical analgesics, formal outpatient physical therapy for strengthening of quads, hamstrings, hip abductors, ice application, corticosteroid, viscosupplementation injections  If nonoperative treatment performed with persistent symptoms, also discussed surgical intervention by means of total knee arthroplasty  Discussed surgical intervention, intraoperative findings, post op rehabilitation, and use of walker or cane for ambulation  Risks of surgical intervention discussed with patient including but not limited to infection, fracture, need for additional procedures, injury to surrounding structures, wound healing complications, DVT, PE  Patient verbalized understanding of the above and would like to proceed forward with physical therapy  A referral was placed for physical therapy  I will see the patient back in approximately 3 months for re-evaluation  Chief Complaint     Bilateral knee pain    History of the Present Illness     Low Michel is a 60 y o  female with Bilateral knee pain that has been present for 3 years with left knee pain greater than right  There was an injury on the left knee lifting coal into the coal stove  1 year following this the right knee began hurting  She had an MRI 3 years ago and was told she had a meniscus tear of the left knee  Following this she was told she has arthritis at [facility]  She denies any prior injections or surgeries to her knees  She denies any locking or catching  She has stiffness first thing in the morning  Days she is sitting at the desk she has an increase in pain  She notes weakness which causes her knees to give out on her on occasion  She has not attended physical therapy  She is an   She is pre-diabetic  She is not a smoker  She uses Tylenol and Aspercreme for pain management  Review of Systems     Review of Systems   Constitutional: Negative for appetite change and unexpected weight change  HENT: Negative for congestion and trouble swallowing  Eyes: Negative for visual disturbance  Respiratory: Negative for cough and shortness of breath  Cardiovascular: Negative for chest pain and palpitations  Gastrointestinal: Negative for nausea and vomiting  Endocrine: Negative for cold intolerance and heat intolerance  Musculoskeletal: Positive for arthralgias  Negative for joint swelling and myalgias  Skin: Negative for rash  Neurological: Negative for numbness  Physical Exam     /82   Pulse 93   Ht 5' 2" (1 575 m)   Wt 99 kg (218 lb 3 2 oz)   BMI 39 91 kg/m²     Right Knee  Range of motion from 0 to 130  There is no crepitus with range of motion  There is no effusion  There is tenderness over the medial joint line  There is 5/5 quadriceps strength  The patient is able to perform a straight leg raise  negative patellar grind test   Anterior drawer tests is negative  negative Lachman Test    Posterior drawer test is   negative   Varus stress testing reveals no instability at 0 and 30 degrees   Valgus stress testing reveals no instability at 0 and 30 degrees  Keith's testing is negative   The patient is neurovascular intact distally      Left Knee  Range of motion from 5 to 120  There is mild crepitus with range of motion  There is no effusion  There is tenderness over the medial joint line  There is 5/5 quadriceps strength  The patient is able to perform a straight leg raise  negative patellar grind test   Anterior drawer tests is negative  negative Lachman Test    Posterior drawer test is   negative   Varus stress testing reveals no instability at 0 and 30 degrees   Valgus stress testing reveals no instability at 0 and 30 degrees  Keith's testing is negative   The patient is neurovascular intact distally  Eyes:  Anicteric sclerae  Neck:  Supple  Lungs:  Normal respiratory effort  Cardiovascular:  Capillary refill is less than 2 seconds  Skin:  Intact without erythema  Neurologic:  Sensation grossly intact to light touch  Psychiatric:  Mood and affect are appropriate  Data Review     I have personally reviewed pertinent films in PACS, and my interpretation follows:    X-rays taken 3/16/23 of Right knee independently reviewed and demonstrate moderate degenerative changes at the medial joint space with osteophyte formation  X-rays taken 3/16/23 of Left knee independently reviewed and demonstrate  moderate to severe degenerative changes at the medial joint space, mild patellofemoral degenerative changes with osteophyte formation  Past Medical History:   Diagnosis Date   • Atrial fibrillation ([address] ) 2021   • Hypertension    • Impaired fasting glucose    • Pain        Past Surgical History:   Procedure Laterality Date   • HYSTERECTOMY  12/23/2006   • OOPHORECTOMY Right     unilateral   • OTHER SURGICAL HISTORY      Excision of lesion Genitalia Meron last assessed 11/01/16   • NE COLONOSCOPY FLX DX W/COLLJ SPEC WHEN PFRMD N/A 7/21/2016    Procedure: COLONOSCOPY;  Surgeon: Vince Denver, MD;  Location: [facility];   Service: Gastroenterology   • TUBAL LIGATION         Allergies   Allergen Reactions   • Erythromycin    • Sulfa Antibiotics    • Tetracycline        Current Outpatient Medications on File Prior to Visit   Medication Sig Dispense Refill   • Azelaic Acid 15 % cream Apply 1 application  topically 2 (two) times a day 50 g 4   • Biotin 5000 MCG CAPS Take 1 capsule by mouth daily     • fluticasone (FLONASE) 50 mcg/act nasal spray SPRAY 2 SPRAYS INTO EACH NOSTRIL EVERY DAY 48 mL 0   • loratadine (CLARITIN) 10 mg tablet Take 1 tablet (10 mg total) by mouth daily 30 tablet 0   • losartan (COZAAR) 100 MG tablet Take 100 mg by mouth daily     • metoprolol succinate (TOPROL-XL) 100 mg 24 hr tablet TAKE 1 TABLET BY MOUTH EVERY DAY 90 tablet 1   • omeprazole (PriLOSEC) 40 MG capsule TAKE 1 CAPSULE BY MOUTH EVERY DAY 90 capsule 1   • rivaroxaban (XARELTO) 20 mg tablet      • Xarelto 20 MG tablet Take 20 mg by mouth daily     • Eliquis 2 5 MG Take 2 5 mg by mouth 2 (two) times a day     • METRONIDAZOLE, TOPICAL, 0 75 % LOTN Apply topically 2 (two) times a day (Patient not taking: Reported on 3/8/2023) 59 mL 5     No current facility-administered medications on file prior to visit  Social History     Tobacco Use   • Smoking status: Never   • Smokeless tobacco: Never   • Tobacco comments:     Former   Vaping Use   • Vaping Use: Never used   Substance Use Topics   • Alcohol use: No   • Drug use: No       Family History   Problem Relation Age of Onset   • COPD Mother    • Heart failure Mother    • Hyperlipidemia Mother    • Hypertension Mother    • COPD Father         moderate   • No Known Problems Sister    • No Known Problems Maternal Grandmother    • Breast cancer Paternal Grandmother 79   • Cancer Maternal Aunt         Urinary bladder   • No Known Problems Maternal Aunt              Procedures Performed     Procedures  None performed         Danni Caro DO  Scribe Attestation    I,:  Kaylie Comment am acting as a scribe while in the presence of the attending physician :       I,:  Danni Caro DO personally performed the services described in this documentation    as scribed in my presence :

## 2023-04-14 PROBLEM — G25.81 RESTLESS LEG SYNDROME: Status: RESOLVED | Noted: 2017-01-18 | Resolved: 2023-04-14

## 2023-04-14 PROBLEM — M79.644 BILATERAL THUMB PAIN: Status: ACTIVE | Noted: 2023-04-14

## 2023-04-14 PROBLEM — M79.645 BILATERAL THUMB PAIN: Status: ACTIVE | Noted: 2023-04-14

## 2023-07-14 ENCOUNTER — EVALUATION (OUTPATIENT)
Dept: PHYSICAL THERAPY | Facility: CLINIC | Age: 63
End: 2023-07-14
Payer: COMMERCIAL

## 2023-07-14 DIAGNOSIS — M25.561 CHRONIC PAIN OF BOTH KNEES: ICD-10-CM

## 2023-07-14 DIAGNOSIS — M17.11 PRIMARY OSTEOARTHRITIS OF RIGHT KNEE: ICD-10-CM

## 2023-07-14 DIAGNOSIS — M25.562 CHRONIC PAIN OF BOTH KNEES: ICD-10-CM

## 2023-07-14 DIAGNOSIS — G89.29 CHRONIC PAIN OF BOTH KNEES: ICD-10-CM

## 2023-07-14 DIAGNOSIS — M17.12 PRIMARY OSTEOARTHRITIS OF LEFT KNEE: Primary | ICD-10-CM

## 2023-07-14 PROCEDURE — 97161 PT EVAL LOW COMPLEX 20 MIN: CPT | Performed by: PHYSICAL THERAPIST

## 2023-07-14 PROCEDURE — 97110 THERAPEUTIC EXERCISES: CPT | Performed by: PHYSICAL THERAPIST

## 2023-07-17 DIAGNOSIS — I10 ESSENTIAL HYPERTENSION: Primary | ICD-10-CM

## 2023-07-17 RX ORDER — LOSARTAN POTASSIUM 100 MG/1
100 TABLET ORAL DAILY
Qty: 90 TABLET | Refills: 1 | Status: SHIPPED | OUTPATIENT
Start: 2023-07-17

## 2023-07-22 DIAGNOSIS — K21.9 GASTROESOPHAGEAL REFLUX DISEASE: ICD-10-CM

## 2023-07-22 RX ORDER — OMEPRAZOLE 40 MG/1
CAPSULE, DELAYED RELEASE ORAL
Qty: 90 CAPSULE | Refills: 1 | Status: SHIPPED | OUTPATIENT
Start: 2023-07-22

## 2023-07-26 ENCOUNTER — OFFICE VISIT (OUTPATIENT)
Dept: PHYSICAL THERAPY | Facility: CLINIC | Age: 63
End: 2023-07-26
Payer: COMMERCIAL

## 2023-07-26 DIAGNOSIS — M17.12 PRIMARY OSTEOARTHRITIS OF LEFT KNEE: ICD-10-CM

## 2023-07-26 DIAGNOSIS — G89.29 CHRONIC PAIN OF BOTH KNEES: Primary | ICD-10-CM

## 2023-07-26 DIAGNOSIS — M25.561 CHRONIC PAIN OF BOTH KNEES: Primary | ICD-10-CM

## 2023-07-26 DIAGNOSIS — M25.562 CHRONIC PAIN OF BOTH KNEES: Primary | ICD-10-CM

## 2023-07-26 PROCEDURE — 97110 THERAPEUTIC EXERCISES: CPT | Performed by: PHYSICAL THERAPIST

## 2023-07-26 PROCEDURE — 97112 NEUROMUSCULAR REEDUCATION: CPT | Performed by: PHYSICAL THERAPIST

## 2023-07-26 NOTE — PROGRESS NOTES
Daily Note     Today's date: 2023  Patient name: Shana Hubbard  : 1960  MRN: [id number]  Referring provider: Margarito Parks DO  Dx:   Encounter Diagnosis     ICD-10-CM    1. Chronic pain of both knees  M25.561     M25.562     G89.29       2. Primary osteoarthritis of left knee  M17.12                      Subjective: patient reports that she feels looser post session -  She notes that she didn't realize how weak her left LE was until she did sls for pres today       Objective: See treatment diary below      Assessment: Tolerated treatment fair. Patient exhibited good technique with therapeutic exercises and would benefit from continued PT  Lacks tke left  - able to [facility] to -2 left with (+) pain reported. Plan: Continue per plan of care. Progress treatment as tolerated. Precautions: L < R knee pain - moderate OA     [email or web address]  Access Code: [email or web address]    POC expires Auth Status Unit limit Start date  Expiration date PT/OT + Visit Limit?    23 pending 3 3/16/23 3/16/24 BOMN                                     Visit/Unit Tracking  AUTH Status:  Date 7/15 7/26             Pending  Used 1 2              Remaining                        Manuals 7/15 7/26                        Manual knee ext bilaterally                                        Neuro Re-Ed                          Qs, add sets 3s x 20 3s x 20                                                                             Ther Ex             bike  10'            Heel slide 20x  HEP           slr 20x ea  20x ea           S/l abd 20x  20x            saq  3s x 20            hss w/ strap 20s x 3 bilat 20s x bilat           Prone QS  3s x 20           Prone quad stretch w/ strap  20s x 5            Standing HR, hip abd, hs curls  20x             Squats @ bar                          Sidestepping TB                                                    Ther Activity                                       Gait Training Modalities

## 2023-09-14 ENCOUNTER — EVALUATION (OUTPATIENT)
Dept: PHYSICAL THERAPY | Facility: CLINIC | Age: 63
End: 2023-09-14

## 2023-09-14 DIAGNOSIS — G89.29 CHRONIC PAIN OF BOTH KNEES: Primary | ICD-10-CM

## 2023-09-14 DIAGNOSIS — M25.561 CHRONIC PAIN OF BOTH KNEES: Primary | ICD-10-CM

## 2023-09-14 DIAGNOSIS — M25.562 CHRONIC PAIN OF BOTH KNEES: Primary | ICD-10-CM

## 2023-09-14 NOTE — PROGRESS NOTES
Patient arrived for appointment today having not been seen in PT since 7-26-23 due to difficult work schedule    After discussion - she prefers to not attend PT at this time due to work conflicts as well as high co-pay and deductibles that still need to be met    She was encouraged to f/u with Dr Cecil Schmidt to address her continued knee pain she was also encouraged to continue to participate in HEP for which she had been issued written copies , she was demonstrating competency     No charge for todays appointment

## 2023-09-21 ENCOUNTER — TELEPHONE (OUTPATIENT)
Dept: FAMILY MEDICINE CLINIC | Facility: CLINIC | Age: 63
End: 2023-09-21

## 2023-09-21 DIAGNOSIS — Z12.31 SCREENING MAMMOGRAM FOR BREAST CANCER: Primary | ICD-10-CM

## 2023-09-22 ENCOUNTER — HOSPITAL ENCOUNTER (OUTPATIENT)
Age: 63
Discharge: HOME | End: 2023-09-22
Payer: COMMERCIAL

## 2023-09-22 VITALS — HEIGHT: 64 IN | BODY MASS INDEX: 37.39 KG/M2 | WEIGHT: 219 LBS

## 2023-09-22 DIAGNOSIS — Z12.31 SCREENING MAMMOGRAM FOR BREAST CANCER: ICD-10-CM

## 2023-09-22 PROCEDURE — 77063 BREAST TOMOSYNTHESIS BI: CPT

## 2023-09-22 PROCEDURE — 77067 SCR MAMMO BI INCL CAD: CPT

## 2023-10-17 ENCOUNTER — OFFICE VISIT (OUTPATIENT)
Dept: FAMILY MEDICINE CLINIC | Facility: CLINIC | Age: 63
End: 2023-10-17
Payer: COMMERCIAL

## 2023-10-17 VITALS
WEIGHT: 220 LBS | SYSTOLIC BLOOD PRESSURE: 128 MMHG | TEMPERATURE: 97.1 F | HEIGHT: 64 IN | HEART RATE: 90 BPM | DIASTOLIC BLOOD PRESSURE: 78 MMHG | BODY MASS INDEX: 37.56 KG/M2

## 2023-10-17 DIAGNOSIS — R73.01 IMPAIRED FASTING GLUCOSE: ICD-10-CM

## 2023-10-17 DIAGNOSIS — E66.01 MORBID OBESITY (HCC): ICD-10-CM

## 2023-10-17 DIAGNOSIS — M25.561 CHRONIC PAIN OF BOTH KNEES: ICD-10-CM

## 2023-10-17 DIAGNOSIS — M25.562 CHRONIC PAIN OF BOTH KNEES: ICD-10-CM

## 2023-10-17 DIAGNOSIS — Z23 ENCOUNTER FOR IMMUNIZATION: ICD-10-CM

## 2023-10-17 DIAGNOSIS — Z00.00 ANNUAL PHYSICAL EXAM: Primary | ICD-10-CM

## 2023-10-17 DIAGNOSIS — G89.29 CHRONIC PAIN OF BOTH KNEES: ICD-10-CM

## 2023-10-17 DIAGNOSIS — I10 ESSENTIAL HYPERTENSION: ICD-10-CM

## 2023-10-17 PROBLEM — Z90.710 S/P LAPAROSCOPIC HYSTERECTOMY: Status: ACTIVE | Noted: 2023-10-17

## 2023-10-17 PROCEDURE — 90471 IMMUNIZATION ADMIN: CPT

## 2023-10-17 PROCEDURE — 99396 PREV VISIT EST AGE 40-64: CPT | Performed by: PHYSICIAN ASSISTANT

## 2023-10-17 PROCEDURE — 90686 IIV4 VACC NO PRSV 0.5 ML IM: CPT

## 2023-10-17 RX ORDER — VIT C/B6/B5/MAGNESIUM/HERB 173 50-5-6-5MG
1 CAPSULE ORAL DAILY
Start: 2023-10-17

## 2023-10-17 RX ORDER — LANOLIN ALCOHOL/MO/W.PET/CERES
1 CREAM (GRAM) TOPICAL 3 TIMES DAILY
Start: 2023-10-17

## 2023-10-17 NOTE — PATIENT INSTRUCTIONS
Wellness Visit for Adults   AMBULATORY CARE:   A wellness visit  is when you see your healthcare provider to get screened for health problems. Your healthcare provider will also give you advice on how to stay healthy. Write down your questions so you remember to ask them. Ask your healthcare provider how often you should have a wellness visit. What happens at a wellness visit:  Your healthcare provider will ask about your health, and your family history of health problems. This includes high blood pressure, heart disease, and cancer. He or she will ask if you have symptoms that concern you, if you smoke, and about your mood. You may also be asked about your intake of medicines, supplements, food, and alcohol. Any of the following may be done: Your weight  will be checked. Your height may also be checked so your body mass index (BMI) can be calculated. Your BMI shows if you are at a healthy weight. Your blood pressure  and heart rate will be checked. Your temperature may also be checked. Blood and urine tests  may be done. Blood tests may be done to check your cholesterol levels. Abnormal cholesterol levels increase your risk for heart disease and stroke. You may also need a blood or urine test to check for diabetes if you are at increased risk. Urine tests may be done to look for signs of an infection or kidney disease. A physical exam  includes checking your heartbeat and lungs with a stethoscope. Your healthcare provider may also check your skin to look for sun damage. Screening tests  may be recommended. A screening test is done to check for diseases that may not cause symptoms. The screening tests you may need depend on your age, gender, family history, and lifestyle habits. For example, colorectal screening may be recommended if you are 48years old or older. Screening tests you need if you are a woman:   A Pap smear  is used to screen for cervical cancer.  Pap smears are usually done every 3 to 5 years depending on your age. You may need them more often if you have had abnormal Pap smear test results in the past. Ask your healthcare provider how often you should have a Pap smear. A mammogram  is an x-ray of your breasts to screen for breast cancer. Experts recommend mammograms every 2 years starting at age 48 years. You may need a mammogram at age 52 years or younger if you have an increased risk for breast cancer. Talk to your healthcare provider about when you should start having mammograms and how often you need them. Vaccines you may need:   Get an influenza vaccine  every year. The influenza vaccine protects you from the flu. Several types of viruses cause the flu. The viruses change over time, so new vaccines are made each year. Get a tetanus-diphtheria (Td) booster vaccine  every 10 years. This vaccine protects you against tetanus and diphtheria. Tetanus is a severe infection that may cause painful muscle spasms and lockjaw. Diphtheria is a severe bacterial infection that causes a thick covering in the back of your mouth and throat. Get a human papillomavirus (HPV) vaccine  if you are female and aged 23 to 32 or male 23 to 24 and never received it. This vaccine protects you from HPV infection. HPV is the most common infection spread by sexual contact. HPV may also cause vaginal, penile, and anal cancers. Get a pneumococcal vaccine  if you are aged 72 years or older. The pneumococcal vaccine is an injection given to protect you from pneumococcal disease. Pneumococcal disease is an infection caused by pneumococcal bacteria. The infection may cause pneumonia, meningitis, or an ear infection. Get a shingles vaccine  if you are 60 or older, even if you have had shingles before. The shingles vaccine is an injection to protect you from the varicella-zoster virus. This is the same virus that causes chickenpox.  Shingles is a painful rash that develops in people who had chickenpox or have been exposed to the virus. How to eat healthy:  My Plate is a model for planning healthy meals. It shows the types and amounts of foods that should go on your plate. Fruits and vegetables make up about half of your plate, and grains and protein make up the other half. A serving of dairy is included on the side of your plate. The amount of calories and serving sizes you need depends on your age, gender, weight, and height. Examples of healthy foods are listed below:  Eat a variety of vegetables  such as dark green, red, and orange vegetables. You can also include canned vegetables low in sodium (salt) and frozen vegetables without added butter or sauces. Eat a variety of fresh fruits , canned fruit in 100% juice, frozen fruit, and dried fruit. Include whole grains. At least half of the grains you eat should be whole grains. Examples include whole-wheat bread, wheat pasta, brown rice, and whole-grain cereals such as oatmeal.    Eat a variety of protein foods such as seafood (fish and shellfish), lean meat, and poultry without skin (turkey and chicken). Examples of lean meats include pork leg, shoulder, or tenderloin, and beef round, sirloin, tenderloin, and extra lean ground beef. Other protein foods include eggs and egg substitutes, beans, peas, soy products, nuts, and seeds. Choose low-fat dairy products such as skim or 1% milk or low-fat yogurt, cheese, and cottage cheese. Limit unhealthy fats  such as butter, hard margarine, and shortening. Exercise:  Exercise at least 30 minutes per day on most days of the week. Some examples of exercise include walking, biking, dancing, and swimming. You can also fit in more physical activity by taking the stairs instead of the elevator or parking farther away from stores. Include muscle strengthening activities 2 days each week. Regular exercise provides many health benefits.  It helps you manage your weight, and decreases your risk for type 2 diabetes, heart disease, stroke, and high blood pressure. Exercise can also help improve your mood. Ask your healthcare provider about the best exercise plan for you. General health and safety guidelines:   Do not smoke. Nicotine and other chemicals in cigarettes and cigars can cause lung damage. Ask your healthcare provider for information if you currently smoke and need help to quit. E-cigarettes or smokeless tobacco still contain nicotine. Talk to your healthcare provider before you use these products. Limit alcohol. A drink of alcohol is 12 ounces of beer, 5 ounces of wine, or 1½ ounces of liquor. Lose weight, if needed. Being overweight increases your risk of certain health conditions. These include heart disease, high blood pressure, type 2 diabetes, and certain types of cancer. Protect your skin. Do not sunbathe or use tanning beds. Use sunscreen with a SPF 15 or higher. Apply sunscreen at least 15 minutes before you go outside. Reapply sunscreen every 2 hours. Wear protective clothing, hats, and sunglasses when you are outside. Drive safely. Always wear your seatbelt. Make sure everyone in your car wears a seatbelt. A seatbelt can save your life if you are in an accident. Do not use your cell phone when you are driving. This could distract you and cause an accident. Pull over if you need to make a call or send a text message. Practice safe sex. Use latex condoms if are sexually active and have more than one partner. Your healthcare provider may recommend screening tests for sexually transmitted infections (STIs). Wear helmets, lifejackets, and protective gear. Always wear a helmet when you ride a bike or motorcycle, go skiing, or play sports that could cause a head injury. Wear protective equipment when you play sports. Wear a lifejacket when you are on a boat or doing water sports.     © Copyright [facility] 2023 Information is for End User's use only and may not be sold, redistributed or otherwise used for commercial purposes. The above information is an  only. It is not intended as medical advice for individual conditions or treatments. Talk to your doctor, nurse or pharmacist before following any medical regimen to see if it is safe and effective for you. Cholesterol and Your Health   AMBULATORY CARE:   Cholesterol  is a waxy, fat-like substance. Your body uses cholesterol to make hormones and new cells, and to protect nerves. Cholesterol is made by your body. It also comes from certain foods you eat, such as meat and dairy products. Your healthcare provider can help you set goals for your cholesterol levels. Your provider can help you create a plan to meet your goals. Cholesterol level goals: Your cholesterol level goals depend on your risk for heart disease, your age, and your other health conditions. The following are general guidelines: Total cholesterol  includes low-density lipoprotein (LDL), high-density lipoprotein (HDL), and triglyceride levels. The total cholesterol level should be lower than 200 mg/dL and is best at about 150 mg/dL. LDL cholesterol  is called bad cholesterol  because it forms plaque in your arteries. As plaque builds up, your arteries become narrow, and less blood flows through. When plaque decreases blood flow to your heart, you may have chest pain. If plaque completely blocks an artery that brings blood to your heart, you may have a heart attack. Plaque can break off and form blood clots. Blood clots may block arteries in your brain and cause a stroke. The level should be less than 130 mg/dL and is best at about 100 mg/dL. HDL cholesterol  is called good cholesterol  because it helps remove LDL cholesterol from your arteries. It does this by attaching to LDL cholesterol and carrying it to your liver. Your liver breaks down LDL cholesterol so your body can get rid of it.  High levels of HDL cholesterol can help prevent a heart attack and stroke. Low levels of HDL cholesterol can increase your risk for heart disease, heart attack, and stroke. The level should be at least 40 mg/dL in males or at least 50 mg/dL in females. Triglycerides  are a type of fat that store energy from foods you eat. High levels of triglycerides also cause plaque buildup. This can increase your risk for a heart attack or stroke. If your triglyceride level is high, your LDL cholesterol level may also be high. The level should be less than 150 mg/dL. Any of the following can increase your risk for high cholesterol:   Smoking or drinking large amounts of alcohol    Having overweight or obesity, or not getting enough exercise    A medical condition such as hypertension (high blood pressure) or diabetes    A family history of high cholesterol    Age older than 72    What you need to know about having your cholesterol levels checked: Adults 21to 39years of age should have their cholesterol levels checked every 4 to 6 years. Adults 45 years or older should have their cholesterol checked every 1 to 2 years. You may need your cholesterol checked more often, or at a younger age, if you have risk factors for heart disease. You may also need to have your cholesterol checked more often if you have other health conditions, such as diabetes. Blood tests are used to check cholesterol levels. Blood tests measure your levels of triglycerides, LDL cholesterol, and HDL cholesterol. How healthy fats affect your cholesterol levels:  Healthy fats, also called unsaturated fats, help lower LDL cholesterol and triglyceride levels. Healthy fats include the following:  Monounsaturated fats  are found in foods such as olive oil, canola oil, avocado, nuts, and olives. Polyunsaturated fats,  such as omega 3 fats, are found in fish, such as salmon, trout, and tuna. They can also be found in plant foods such as flaxseed, walnuts, and soybeans.     How unhealthy fats affect your cholesterol levels: Unhealthy fats increase LDL cholesterol and triglyceride levels. They are found in foods high in cholesterol, saturated fat, and trans fat:  Cholesterol  is found in eggs, dairy, and meat. Saturated fat  is found in butter, cheese, ice cream, whole milk, and coconut oil. Saturated fat is also found in meat, such as sausage, hot dogs, and bologna. Trans fat  is found in liquid oils and is used in fried and baked foods. Foods that contain trans fats include chips, crackers, muffins, sweet rolls, microwave popcorn, and cookies. Treatment  for high cholesterol will also decrease your risk of heart disease, heart attack, and stroke. Treatment may include any of the following:  Lifestyle changes  may include food, exercise, weight loss, and quitting smoking. You may also need to decrease the amount of alcohol you drink. Your healthcare provider will want you to start with lifestyle changes. Other treatment may be added if lifestyle changes are not enough. Your healthcare provider may recommend you work with a team to manage hyperlipidemia. The team may include medical experts such as a dietitian, an exercise or physical therapist, and a behavior therapist. Your family members may be included in helping you create lifestyle changes. Medicines  may be given to lower your LDL cholesterol, triglyceride levels, or total cholesterol level. You may need medicines to lower your cholesterol if any of the following is true:    You have a history of stroke, TIA, unstable angina, or a heart attack. Your LDL cholesterol level is 190 mg/dL or higher. You are age 36 to 76 years, have diabetes or heart disease risk factors, and your LDL cholesterol is 70 mg/dL or higher. Supplements  include fish oil, red yeast rice, and garlic. Fish oil may help lower your triglyceride and LDL cholesterol levels. It may also increase your HDL cholesterol level.  Red yeast rice may help decrease your total cholesterol level and LDL cholesterol level. Garlic may help lower your total cholesterol level. Do not take any supplements without talking to your healthcare provider. Food changes you can make to lower your cholesterol levels:  A dietitian can help you create a healthy eating plan. Your dietitian can show you how to read food labels and choose foods low in saturated fat, trans fats, and cholesterol. Decrease the total amount of fat you eat. Choose lean meats, fat-free or 1% fat milk, and low-fat dairy products, such as yogurt and cheese. Try to limit or avoid red meats. Limit or do not eat fried foods or baked goods, such as cookies. Replace unhealthy fats with healthy fats. Cook foods in olive oil or canola oil. Choose soft margarines that are low in saturated fat and trans fat. Seeds, nuts, and avocados are other examples of healthy fats. Eat foods with omega-3 fats. Examples include salmon, tuna, mackerel, walnuts, and flaxseed. Eat fish 2 times per week. Pregnant women should not eat fish that have high levels of mercury, such as shark, swordfish, and jeremy mackerel. Increase the amount of high-fiber foods you eat. High-fiber foods can help lower your LDL cholesterol. Aim to get between 20 and 30 grams of fiber each day. Fruits and vegetables are high in fiber. Eat at least 5 servings each day. Other high-fiber foods are whole-grain or whole-wheat breads, pastas, or cereals, and brown rice. Eat 3 ounces of whole-grain foods each day. Increase fiber slowly. You may have abdominal discomfort, bloating, and gas if you add fiber to your diet too quickly. Eat healthy protein foods. Examples include low-fat dairy products, skinless chicken and turkey, fish, and nuts. Limit foods and drinks that are high in sugar. Your dietitian or healthcare provider can help you create daily limits for high-sugar foods and drinks. The limit may be lower if you have diabetes or another health condition.  Limits can also help you lose weight if needed. Lifestyle changes you can make to lower your cholesterol levels:   Maintain a healthy weight. Ask your healthcare provider what a healthy weight is for you. Ask your provider to help you create a weight loss plan if needed. Weight loss can decrease your total cholesterol and triglyceride levels. Weight loss may also help keep your blood pressure at a healthy level. Be physically active throughout the day. Physical activity, such as exercise, can help lower your total cholesterol level and maintain a healthy weight. Physical activity can also help increase your HDL cholesterol level. Work with your healthcare provider to create an program that is right for you. Get at least 30 to 40 minutes of moderate physical activity most days of the week. Examples of exercise include brisk walking, swimming, or biking. Also include strength training at least 2 times each week. Your healthcare providers can help you create a physical activity plan. Do not smoke. Nicotine and other chemicals in cigarettes and cigars can raise your cholesterol levels. Ask your healthcare provider for information if you currently smoke and need help to quit. E-cigarettes or smokeless tobacco still contain nicotine. Talk to your healthcare provider before you use these products. Limit or do not drink alcohol. Alcohol can increase your triglyceride levels. Ask your healthcare provider before you drink alcohol. Ask how much is okay for you to drink in 24 hours or 1 week. Follow up with your doctor as directed:  Write down your questions so you remember to ask them during your visits. © Copyright [employer] 2023 Information is for End User's use only and may not be sold, redistributed or otherwise used for commercial purposes. The above information is an  only. It is not intended as medical advice for individual conditions or treatments.  Talk to your doctor, nurse or pharmacist before following any medical regimen to see if it is safe and effective for you.

## 2023-10-17 NOTE — PROGRESS NOTES
[address]    NAME: Meera Estrada  AGE: 58 y.o. SEX: female  : 1960     DATE: 10/17/2023     Assessment and Plan:     Problem List Items Addressed This Visit        Endocrine    Impaired fasting glucose    Relevant Orders    CBC and differential    Comprehensive metabolic panel    HEMOGLOBIN A1C W/ EAG ESTIMATION       Cardiovascular and Mediastinum    Essential hypertension       Other    Morbid obesity (HCC)    Relevant Orders    Lipid Panel with Direct LDL reflex    TSH, 3rd generation    Annual physical exam - Primary    Chronic pain of both knees    Relevant Medications    glucosamine-chondroitin 500-400 MG tablet    Turmeric 500 MG CAPS    Other Relevant Orders    CBC and differential   Other Visit Diagnoses     Encounter for immunization        Relevant Orders    influenza vaccine, quadrivalent, 0.5 mL, preservative-free, for adult and pediatric patients 6 mos+ (AFLURIA, FLUARIX, FLULAVAL, FLUZONE) (Completed)          Immunizations and preventive care screenings were discussed with patient today. Appropriate education was printed on patient's after visit summary. Counseling:  Dental Health: discussed importance of regular tooth brushing, flossing, and dental visits. Injury prevention: discussed safety/seat belts, safety helmets, smoke detectors, carbon dioxide detectors, and smoking near bedding or upholstery. Exercise: the importance of regular exercise/physical activity was discussed. Recommend exercise 3-5 times per week for at least 30 minutes. Return in about 6 months (around 2024) for Recheck. Chief Complaint:     Chief Complaint   Patient presents with   • Physical Exam      History of Present Illness:     Adult Annual Physical   Patient here for a comprehensive physical exam. The patient reports problems - knee pain .     Diet and Physical Activity  Diet/Nutrition: limited junk food and consuming 3-5 servings of fruits/vegetables daily. Exercise: no formal exercise. Depression Screening  PHQ-2/9 Depression Screening         General Health  Sleep: sleeps poorly. Hearing: normal - bilateral.  Vision: goes for regular eye exams and wears glasses. Dental: regular dental visits. /GYN Health  Patient is: postmenopausal  Last menstrual period: postmenopausal  Contraceptive method:  postmenopausal .    Advanced Care Planning  Do you have an advanced directive? no  Do you have a durable medical power of ? no     Review of Systems:     Review of Systems   Constitutional:  Negative for chills, diaphoresis and fatigue. HENT:  Negative for congestion, postnasal drip, sore throat and trouble swallowing. Eyes:  Negative for pain and visual disturbance. Respiratory:  Negative for cough, chest tightness and shortness of breath. Cardiovascular:  Negative for chest pain, palpitations and leg swelling. Gastrointestinal:  Negative for abdominal pain, constipation, diarrhea and nausea. Endocrine: Negative for polydipsia, polyphagia and polyuria. Genitourinary:  Negative for difficulty urinating and dysuria. Musculoskeletal:  Positive for arthralgias. Negative for neck pain. Neurological:  Negative for dizziness, syncope, light-headedness and headaches. Psychiatric/Behavioral:  Negative for behavioral problems, decreased concentration, dysphoric mood, self-injury and suicidal ideas. The patient is not nervous/anxious.        Past Medical History:     Past Medical History:   Diagnosis Date   • Atrial fibrillation ([address]) 2021   • Hypertension    • Impaired fasting glucose    • Pain       Past Surgical History:     Past Surgical History:   Procedure Laterality Date   • HYSTERECTOMY  12/23/2006   • OOPHORECTOMY Right     unilateral   • OTHER SURGICAL HISTORY      Excision of lesion Genitalia Meron last assessed 11/01/16   • [place] COLONOSCOPY FLX DX W/COLLJ SPEC WHEN PFRMD N/A 7/21/2016    Procedure: COLONOSCOPY;  Surgeon: Edson Davila MD;  Location: [facility]; Service: Gastroenterology   • TUBAL LIGATION        Social History:     Social History     Socioeconomic History   • Marital status: Single     Spouse name: None   • Number of children: None   • Years of education: None   • Highest education level: None   Occupational History   • Occupation: Neuro ass    Tobacco Use   • Smoking status: Never   • Smokeless tobacco: Never   • Tobacco comments:     Former   Vaping Use   • Vaping Use: Never used   Substance and Sexual Activity   • Alcohol use: No   • Drug use: No   • Sexual activity: Not Currently   Other Topics Concern   • None   Social History Narrative        No caffeine use    Seeing a dentist     Social Determinants of Health     Financial Resource Strain: Not on file   Food Insecurity: Not on file   Transportation Needs: Not on file   Physical Activity: Not on file   Stress: Not on file   Social Connections: Not on file   Intimate Partner Violence: Not on file   Housing Stability: Not on file      Family History:     Family History   Problem Relation Age of Onset   • COPD Mother    • Heart failure Mother    • Hyperlipidemia Mother    • Hypertension Mother    • COPD Father         moderate   • No Known Problems Sister    • No Known Problems Maternal Grandmother    • Breast cancer Paternal Grandmother 79   • Cancer Maternal Aunt         Urinary bladder   • No Known Problems Maternal Aunt       Current Medications:     Current Outpatient Medications   Medication Sig Dispense Refill   • Azelaic Acid 15 % cream Apply 1 application.  topically 2 (two) times a day 50 g 4   • fluticasone (FLONASE) 50 mcg/act nasal spray SPRAY 2 SPRAYS INTO EACH NOSTRIL EVERY DAY 48 mL 0   • glucosamine-chondroitin 500-400 MG tablet Take 1 tablet by mouth 3 (three) times a day     • losartan (COZAAR) 100 MG tablet Take 1 tablet (100 mg total) by mouth daily 90 tablet 1   • metoprolol succinate (TOPROL-XL) 100 mg 24 hr tablet TAKE 1 TABLET BY MOUTH EVERY DAY 90 tablet 1   • omeprazole (PriLOSEC) 40 MG capsule TAKE 1 CAPSULE BY MOUTH EVERY DAY 90 capsule 1   • Turmeric 500 MG CAPS Take 1 capsule (500 mg total) by mouth in the morning     • Xarelto 20 MG tablet Take 20 mg by mouth daily       No current facility-administered medications for this visit. Allergies: Allergies   Allergen Reactions   • Erythromycin    • Sulfa Antibiotics    • Tetracycline       Physical Exam:     /78 (BP Location: Left arm, Patient Position: Sitting, Cuff Size: Standard)   Pulse 90   Temp (!) 97.1 °F (36.2 °C) (Tympanic)   Ht 5' 4" (1.626 m)   Wt 99.8 kg (220 lb)   BMI 37.76 kg/m²     Physical Exam  Vitals and nursing note reviewed. Constitutional:       Appearance: Normal appearance. She is obese. HENT:      Head: Normocephalic and atraumatic. Right Ear: Tympanic membrane, ear canal and external ear normal.      Left Ear: Tympanic membrane, ear canal and external ear normal.      Nose: Nose normal.      Mouth/Throat:      Mouth: Mucous membranes are moist.      Pharynx: Oropharynx is clear. Eyes:      Extraocular Movements: Extraocular movements intact. Conjunctiva/sclera: Conjunctivae normal.   Neck:      Thyroid: No thyromegaly. Vascular: No carotid bruit. Cardiovascular:      Rate and Rhythm: Normal rate and regular rhythm. Pulses: Normal pulses. Heart sounds: Normal heart sounds. Pulmonary:      Effort: Pulmonary effort is normal.      Breath sounds: Normal breath sounds. Abdominal:      General: Abdomen is flat. Bowel sounds are normal.      Palpations: Abdomen is soft. There is no hepatomegaly, splenomegaly or mass. Tenderness: There is no abdominal tenderness. There is no right CVA tenderness or left CVA tenderness. Musculoskeletal:         General: Normal range of motion.       Cervical back: Normal range of motion and neck supple. Right lower leg: No edema. Left lower leg: No edema. Lymphadenopathy:      Cervical: No cervical adenopathy. Skin:     General: Skin is warm and dry. Neurological:      General: No focal deficit present. Mental Status: She is alert and oriented to person, place, and time. Psychiatric:         Mood and Affect: Mood normal.         Behavior: Behavior normal.         Thought Content:  Thought content normal.         Judgment: Judgment normal.          Parisa Kam PA-C  [address] [place] [address]

## 2024-01-13 DIAGNOSIS — I10 ESSENTIAL HYPERTENSION: ICD-10-CM

## 2024-01-15 RX ORDER — LOSARTAN POTASSIUM 100 MG/1
100 TABLET ORAL DAILY
Qty: 90 TABLET | Refills: 1 | Status: SHIPPED | OUTPATIENT
Start: 2024-01-15

## 2024-01-25 DIAGNOSIS — K21.9 GASTROESOPHAGEAL REFLUX DISEASE: ICD-10-CM

## 2024-01-25 RX ORDER — OMEPRAZOLE 40 MG/1
CAPSULE, DELAYED RELEASE ORAL
Qty: 90 CAPSULE | Refills: 1 | Status: SHIPPED | OUTPATIENT
Start: 2024-01-25

## 2024-03-05 ENCOUNTER — OFFICE VISIT (OUTPATIENT)
Dept: FAMILY MEDICINE CLINIC | Facility: CLINIC | Age: 64
End: 2024-03-05
Payer: COMMERCIAL

## 2024-03-05 VITALS
HEIGHT: 64 IN | SYSTOLIC BLOOD PRESSURE: 124 MMHG | OXYGEN SATURATION: 96 % | WEIGHT: 222 LBS | TEMPERATURE: 97.7 F | DIASTOLIC BLOOD PRESSURE: 84 MMHG | HEART RATE: 76 BPM | BODY MASS INDEX: 37.9 KG/M2

## 2024-03-05 DIAGNOSIS — K21.9 GASTROESOPHAGEAL REFLUX DISEASE, UNSPECIFIED WHETHER ESOPHAGITIS PRESENT: Primary | ICD-10-CM

## 2024-03-05 DIAGNOSIS — R42 VERTIGO: ICD-10-CM

## 2024-03-05 DIAGNOSIS — R10.13 EPIGASTRIC PAIN: ICD-10-CM

## 2024-03-05 DIAGNOSIS — I48.19 PERSISTENT ATRIAL FIBRILLATION (HCC): ICD-10-CM

## 2024-03-05 PROCEDURE — 99214 OFFICE O/P EST MOD 30 MIN: CPT | Performed by: PHYSICIAN ASSISTANT

## 2024-03-05 RX ORDER — TRIAMCINOLONE ACETONIDE 5 MG/G
1 CREAM TOPICAL 3 TIMES DAILY
COMMUNITY
Start: 2023-10-25 | End: 2024-10-24

## 2024-03-05 NOTE — PROGRESS NOTES
Assessment/Plan:          Diagnoses and all orders for this visit:    Gastroesophageal reflux disease, unspecified whether esophagitis present  -     Ambulatory Referral to Gastroenterology; Future    Epigastric pain  -     Ambulatory Referral to Gastroenterology; Future    Vertigo  Comments:  symptoms improving, observe    Persistent atrial fibrillation (HCC)  Comments:  continue Xarelto    Other orders  -     triamcinolone (KENALOG) 0.5 % cream; Apply 1 application. topically 3 (three) times a day            Subjective:      Patient ID: Sarah Calhoun is a 63 y.o. female.    Dizziness  This is a new problem. The current episode started in the past 7 days. The problem occurs every several days. The problem has been gradually improving. Associated symptoms include abdominal pain and vertigo. Pertinent negatives include no anorexia, change in bowel habit, chest pain, chills, congestion, coughing, diaphoresis, fever, headaches, nausea, neck pain, numbness, sore throat, swollen glands, visual change or vomiting. The symptoms are aggravated by walking. She has tried rest for the symptoms. The treatment provided mild relief.   Abdominal Pain  This is a new problem. The current episode started 1 to 4 weeks ago. The onset quality is gradual. The problem occurs daily. The problem has been unchanged. The pain is located in the epigastric region. The pain is mild. The quality of the pain is a sensation of fullness and dull. The abdominal pain does not radiate. Pertinent negatives include no anorexia, belching, constipation, diarrhea, dysuria, fever, frequency, headaches, nausea or vomiting. Nothing aggravates the pain. The pain is relieved by Nothing. She has tried proton pump inhibitors for the symptoms. The treatment provided no relief.       The following portions of the patient's history were reviewed and updated as appropriate:   She has a past medical history of Atrial fibrillation (HCC) (2021), Hypertension, Impaired  fasting glucose, and Pain.,  does not have any pertinent problems on file.,   has a past surgical history that includes Tubal ligation; pr colonoscopy flx dx w/collj spec when pfrmd (N/A, 7/21/2016); Other surgical history; Oophorectomy (Right); and Hysterectomy (12/23/2006).,  family history includes Breast cancer (age of onset: 70) in her paternal grandmother; COPD in her father and mother; Cancer in her maternal aunt; Heart failure in her mother; Hyperlipidemia in her mother; Hypertension in her mother; No Known Problems in her maternal aunt, maternal grandmother, and sister.,   reports that she has never smoked. She has never used smokeless tobacco. She reports that she does not drink alcohol and does not use drugs.,  is allergic to erythromycin, sulfa antibiotics, and tetracycline..  Current Outpatient Medications   Medication Sig Dispense Refill   • Azelaic Acid 15 % cream Apply 1 application. topically 2 (two) times a day 50 g 4   • fluticasone (FLONASE) 50 mcg/act nasal spray SPRAY 2 SPRAYS INTO EACH NOSTRIL EVERY DAY 48 mL 0   • glucosamine-chondroitin 500-400 MG tablet Take 1 tablet by mouth 3 (three) times a day     • losartan (COZAAR) 100 MG tablet TAKE 1 TABLET BY MOUTH EVERY DAY 90 tablet 1   • metoprolol succinate (TOPROL-XL) 100 mg 24 hr tablet TAKE 1 TABLET BY MOUTH EVERY DAY 90 tablet 1   • omeprazole (PriLOSEC) 40 MG capsule TAKE 1 CAPSULE BY MOUTH EVERY DAY 90 capsule 1   • triamcinolone (KENALOG) 0.5 % cream Apply 1 application. topically 3 (three) times a day     • Turmeric 500 MG CAPS Take 1 capsule (500 mg total) by mouth in the morning     • Xarelto 20 MG tablet Take 20 mg by mouth daily       No current facility-administered medications for this visit.       Review of Systems   Constitutional:  Negative for chills, diaphoresis and fever.   HENT:  Positive for ear pain and postnasal drip. Negative for congestion and sore throat.    Eyes:  Negative for pain and visual disturbance.  "  Respiratory:  Negative for cough and chest tightness.    Cardiovascular:  Negative for chest pain.   Gastrointestinal:  Positive for abdominal pain. Negative for anorexia, change in bowel habit, constipation, diarrhea, nausea and vomiting.   Genitourinary:  Negative for dysuria and frequency.   Musculoskeletal:  Negative for neck pain.   Neurological:  Positive for dizziness and vertigo. Negative for numbness and headaches.         Objective:  Vitals:    03/05/24 1537   BP: 124/84   BP Location: Left arm   Patient Position: Sitting   Cuff Size: Large   Pulse: 76   Temp: 97.7 °F (36.5 °C)   TempSrc: Tympanic   SpO2: 96%   Weight: 101 kg (222 lb)   Height: 5' 4\" (1.626 m)     Body mass index is 38.11 kg/m².     Physical Exam  Vitals and nursing note reviewed.   Constitutional:       Appearance: She is well-developed.   HENT:      Head: Normocephalic.      Mouth/Throat:      Mouth: Mucous membranes are moist.   Eyes:      General: Lids are normal. No scleral icterus.     Extraocular Movements: Extraocular movements intact.      Right eye: No nystagmus.      Left eye: Nystagmus present.      Conjunctiva/sclera: Conjunctivae normal.   Cardiovascular:      Rate and Rhythm: Normal rate and regular rhythm.      Heart sounds: Normal heart sounds.   Pulmonary:      Effort: Pulmonary effort is normal.      Breath sounds: Normal breath sounds.   Abdominal:      General: Bowel sounds are normal. There is no distension or abdominal bruit. There are no signs of injury.      Palpations: Abdomen is soft. There is no hepatomegaly, splenomegaly, mass or pulsatile mass.      Tenderness: There is abdominal tenderness in the epigastric area. There is no right CVA tenderness, left CVA tenderness, guarding or rebound.   Skin:     General: Skin is dry.   Neurological:      General: No focal deficit present.      Mental Status: She is alert and oriented to person, place, and time.   Psychiatric:         Mood and Affect: Mood normal.        "  Behavior: Behavior normal.

## 2024-03-18 ENCOUNTER — OFFICE VISIT (OUTPATIENT)
Age: 64
End: 2024-03-18
Payer: COMMERCIAL

## 2024-03-18 VITALS
OXYGEN SATURATION: 95 % | BODY MASS INDEX: 37.56 KG/M2 | HEART RATE: 96 BPM | HEIGHT: 64 IN | WEIGHT: 220 LBS | SYSTOLIC BLOOD PRESSURE: 130 MMHG | DIASTOLIC BLOOD PRESSURE: 70 MMHG

## 2024-03-18 DIAGNOSIS — K21.9 GASTROESOPHAGEAL REFLUX DISEASE, UNSPECIFIED WHETHER ESOPHAGITIS PRESENT: Primary | ICD-10-CM

## 2024-03-18 DIAGNOSIS — R10.13 EPIGASTRIC PAIN: ICD-10-CM

## 2024-03-18 PROCEDURE — 99214 OFFICE O/P EST MOD 30 MIN: CPT | Performed by: PHYSICIAN ASSISTANT

## 2024-03-18 NOTE — PROGRESS NOTES
[facility] - Outpatient Follow-up Note  Sarah Calhoun 63 y.o. female MRN: [id number]  Encounter: [id number]          ASSESSMENT AND PLAN:      1. Gastroesophageal reflux disease  2. Epigastric pain    Patient presents with a long history of GERD x 8  years.  She is on Omeprazole daily.  She reports the recent development of epigastric discomfort after lifting boxes which is subsiding now - suspect a musculoskeletal etiology for the epigastric discomfort.    Will plan for EGD to investigate for Mckeon's esophagus, ulcers, gastritis, etc.  She is interested in trying to wean off the Omeprazole if the EGD is negative/no Mckeon's esophagus (we reviewed to try decreasing to every other day for several weeks first).  GERD modifications.      Note: She is on Xarelto for PAF and will hold 2 days prior to the procedure.  ______________________________________________________________________    SUBJECTIVE:  Patient is a pleasant 63 year old female with a PMH of PAF on Xarelto, HTN who presents to the office for a GI evaluation.  Patient has a long history of GERD for over 8 years.  She is on Omeprazole daily.  She reports the recent development of epigastric discomfort after lifting boxes which is subsiding now - suspect a musculoskeletal etiology for the epigastric discomfort. The pain is not associated with eating.  No nausea or vomiting.  Appetite is good.  No melena.  No abdominal bulge. No family history of esophageal, stomach, or colon cancer.  No frequent NSAIDs.  She is interested in trying to come off her PPI.  She had a colonoscopy in October of 2021 and a 12mm adenoma was removed - repeat recommended in 3 years (10/2024).      REVIEW OF SYSTEMS IS OTHERWISE NEGATIVE.      Historical Information   Past Medical History:   Diagnosis Date    Atrial fibrillation (HCC) 2021    Hypertension     Impaired fasting glucose     Pain      Past Surgical History:   Procedure Laterality Date     "HYSTERECTOMY  12/23/2006    OOPHORECTOMY Right     unilateral    OTHER SURGICAL HISTORY      Excision of lesion Genitalia Meron last assessed 11/01/16    OR COLONOSCOPY FLX DX W/COLLJ SPEC WHEN PFRMD N/A 7/21/2016    Procedure: COLONOSCOPY;  Surgeon: Forrest Blandon MD;  Location: [facility];  Service: Gastroenterology    TUBAL LIGATION       Social History   Social History     Substance and Sexual Activity   Alcohol Use No     Social History     Substance and Sexual Activity   Drug Use No     Social History     Tobacco Use   Smoking Status Never   Smokeless Tobacco Never   Tobacco Comments    Former     Family History   Problem Relation Age of Onset    COPD Mother     Heart failure Mother     Hyperlipidemia Mother     Hypertension Mother     COPD Father         moderate    No Known Problems Sister     No Known Problems Maternal Grandmother     Breast cancer Paternal Grandmother 70    Cancer Maternal Aunt         Urinary bladder    No Known Problems Maternal Aunt        Meds/Allergies       Current Outpatient Medications:     Azelaic Acid 15 % cream    fluticasone (FLONASE) 50 mcg/act nasal spray    glucosamine-chondroitin 500-400 MG tablet    losartan (COZAAR) 100 MG tablet    metoprolol succinate (TOPROL-XL) 100 mg 24 hr tablet    omeprazole (PriLOSEC) 40 MG capsule    triamcinolone (KENALOG) 0.5 % cream    Turmeric 500 MG CAPS    Xarelto 20 MG tablet    Allergies   Allergen Reactions    Erythromycin     Sulfa Antibiotics     Tetracycline            Objective     Blood pressure 130/70, pulse 96, height 5' 4\" (1.626 m), weight 99.8 kg (220 lb), SpO2 95%. Body mass index is 37.76 kg/m².      PHYSICAL EXAM:      General Appearance:   Alert, cooperative, no distress   HEENT:   Normocephalic, atraumatic, anicteric    Neck:  Supple, symmetrical, trachea midline   Lungs:   Clear to auscultation bilaterally; no rales, rhonchi or wheezing; respirations unlabored    Heart::   Regular rate and rhythm; no murmur, rub, or " gallop.   Abdomen:   Soft, non-tender, non-distended; normal bowel sounds; no masses, no organomegaly    Genitalia:   Deferred    Rectal:   Deferred    Extremities:  No cyanosis, clubbing or edema    Pulses:  2+ and symmetric    Skin:  No jaundice, rashes, or lesions    Lymph nodes:  No palpable cervical lymphadenopathy        Lab Results:   No visits with results within 1 Day(s) from this visit.   Latest known visit with results is:   Appointment on 12/29/2022   Component Date Value    WBC 12/29/2022 8.22     RBC 12/29/2022 5.04     Hemoglobin 12/29/2022 13.6     Hematocrit 12/29/2022 43.0     MCV 12/29/2022 85     MCH 12/29/2022 27.0     MCHC 12/29/2022 31.6     RDW 12/29/2022 13.6     MPV 12/29/2022 10.5     Platelets 12/29/2022 244     nRBC 12/29/2022 0     Neutrophils Relative 12/29/2022 52     Immature Grans % 12/29/2022 0     Lymphocytes Relative 12/29/2022 36     Monocytes Relative 12/29/2022 8     Eosinophils Relative 12/29/2022 4     Basophils Relative 12/29/2022 0     Neutrophils Absolute 12/29/2022 4.23     Absolute Immature Grans 12/29/2022 0.03     Absolute Lymphocytes 12/29/2022 2.96     Absolute Monocytes 12/29/2022 0.67     Eosinophils Absolute 12/29/2022 0.30     Basophils Absolute 12/29/2022 0.03     Sodium 12/29/2022 138     Potassium 12/29/2022 4.1     Chloride 12/29/2022 108     CO2 12/29/2022 26     ANION GAP 12/29/2022 4     BUN 12/29/2022 16     Creatinine 12/29/2022 1.00     Glucose, Fasting 12/29/2022 92     Calcium 12/29/2022 9.2     AST 12/29/2022 20     ALT 12/29/2022 30     Alkaline Phosphatase 12/29/2022 106     Total Protein 12/29/2022 7.0     Albumin 12/29/2022 3.5     Total Bilirubin 12/29/2022 0.44     eGFR 12/29/2022 60     Cholesterol 12/29/2022 153     Triglycerides 12/29/2022 122     HDL, Direct 12/29/2022 40 (L)     LDL Calculated 12/29/2022 89     TSH 3RD GENERATON 12/29/2022 2.470     Hemoglobin A1C 12/29/2022 5.8 (H)     EAG 12/29/2022 120          Radiology Results:   No  results found.

## 2024-03-18 NOTE — H&P (VIEW-ONLY)
[facility] - Outpatient Follow-up Note  Sarah Calhoun 63 y.o. female MRN: [id number]  Encounter: [id number]          ASSESSMENT AND PLAN:      1. Gastroesophageal reflux disease  2. Epigastric pain    Patient presents with a long history of GERD x 8  years.  She is on Omeprazole daily.  She reports the recent development of epigastric discomfort after lifting boxes which is subsiding now - suspect a musculoskeletal etiology for the epigastric discomfort.    Will plan for EGD to investigate for Mckeon's esophagus, ulcers, gastritis, etc.  She is interested in trying to wean off the Omeprazole if the EGD is negative/no Mckeon's esophagus (we reviewed to try decreasing to every other day for several weeks first).  GERD modifications.      Note: She is on Xarelto for PAF and will hold 2 days prior to the procedure.  ______________________________________________________________________    SUBJECTIVE:  Patient is a pleasant 63 year old female with a PMH of PAF on Xarelto, HTN who presents to the office for a GI evaluation.  Patient has a long history of GERD for over 8 years.  She is on Omeprazole daily.  She reports the recent development of epigastric discomfort after lifting boxes which is subsiding now - suspect a musculoskeletal etiology for the epigastric discomfort. The pain is not associated with eating.  No nausea or vomiting.  Appetite is good.  No melena.  No abdominal bulge. No family history of esophageal, stomach, or colon cancer.  No frequent NSAIDs.  She is interested in trying to come off her PPI.  She had a colonoscopy in October of 2021 and a 12mm adenoma was removed - repeat recommended in 3 years (10/2024).      REVIEW OF SYSTEMS IS OTHERWISE NEGATIVE.      Historical Information   Past Medical History:   Diagnosis Date    Atrial fibrillation (HCC) 2021    Hypertension     Impaired fasting glucose     Pain      Past Surgical History:   Procedure Laterality Date     "HYSTERECTOMY  12/23/2006    OOPHORECTOMY Right     unilateral    OTHER SURGICAL HISTORY      Excision of lesion Genitalia Meron last assessed 11/01/16    [place] COLONOSCOPY FLX DX W/COLLJ SPEC WHEN PFRMD N/A 7/21/2016    Procedure: COLONOSCOPY;  Surgeon: Forrest Blandon MD;  Location: [facility];  Service: Gastroenterology    TUBAL LIGATION       Social History   Social History     Substance and Sexual Activity   Alcohol Use No     Social History     Substance and Sexual Activity   Drug Use No     Social History     Tobacco Use   Smoking Status Never   Smokeless Tobacco Never   Tobacco Comments    Former     Family History   Problem Relation Age of Onset    COPD Mother     Heart failure Mother     Hyperlipidemia Mother     Hypertension Mother     COPD Father         moderate    No Known Problems Sister     No Known Problems Maternal Grandmother     Breast cancer Paternal Grandmother 70    Cancer Maternal Aunt         Urinary bladder    No Known Problems Maternal Aunt        Meds/Allergies       Current Outpatient Medications:     Azelaic Acid 15 % cream    fluticasone (FLONASE) 50 mcg/act nasal spray    glucosamine-chondroitin 500-400 MG tablet    losartan (COZAAR) 100 MG tablet    metoprolol succinate (TOPROL-XL) 100 mg 24 hr tablet    omeprazole (PriLOSEC) 40 MG capsule    triamcinolone (KENALOG) 0.5 % cream    Turmeric 500 MG CAPS    Xarelto 20 MG tablet    Allergies   Allergen Reactions    Erythromycin     Sulfa Antibiotics     Tetracycline            Objective     Blood pressure 130/70, pulse 96, height 5' 4\" (1.626 m), weight 99.8 kg (220 lb), SpO2 95%. Body mass index is 37.76 kg/m².      PHYSICAL EXAM:      General Appearance:   Alert, cooperative, no distress   HEENT:   Normocephalic, atraumatic, anicteric    Neck:  Supple, symmetrical, trachea midline   Lungs:   Clear to auscultation bilaterally; no rales, rhonchi or wheezing; respirations unlabored    Heart::   Regular rate and rhythm; no murmur, rub, or " gallop.   Abdomen:   Soft, non-tender, non-distended; normal bowel sounds; no masses, no organomegaly    Genitalia:   Deferred    Rectal:   Deferred    Extremities:  No cyanosis, clubbing or edema    Pulses:  2+ and symmetric    Skin:  No jaundice, rashes, or lesions    Lymph nodes:  No palpable cervical lymphadenopathy        Lab Results:   No visits with results within 1 Day(s) from this visit.   Latest known visit with results is:   Appointment on 12/29/2022   Component Date Value    WBC 12/29/2022 8.22     RBC 12/29/2022 5.04     Hemoglobin 12/29/2022 13.6     Hematocrit 12/29/2022 43.0     MCV 12/29/2022 85     MCH 12/29/2022 27.0     MCHC 12/29/2022 31.6     RDW 12/29/2022 13.6     MPV 12/29/2022 10.5     Platelets 12/29/2022 244     nRBC 12/29/2022 0     Neutrophils Relative 12/29/2022 52     Immature Grans % 12/29/2022 0     Lymphocytes Relative 12/29/2022 36     Monocytes Relative 12/29/2022 8     Eosinophils Relative 12/29/2022 4     Basophils Relative 12/29/2022 0     Neutrophils Absolute 12/29/2022 4.23     Absolute Immature Grans 12/29/2022 0.03     Absolute Lymphocytes 12/29/2022 2.96     Absolute Monocytes 12/29/2022 0.67     Eosinophils Absolute 12/29/2022 0.30     Basophils Absolute 12/29/2022 0.03     Sodium 12/29/2022 138     Potassium 12/29/2022 4.1     Chloride 12/29/2022 108     CO2 12/29/2022 26     ANION GAP 12/29/2022 4     BUN 12/29/2022 16     Creatinine 12/29/2022 1.00     Glucose, Fasting 12/29/2022 92     Calcium 12/29/2022 9.2     AST 12/29/2022 20     ALT 12/29/2022 30     Alkaline Phosphatase 12/29/2022 106     Total Protein 12/29/2022 7.0     Albumin 12/29/2022 3.5     Total Bilirubin 12/29/2022 0.44     eGFR 12/29/2022 60     Cholesterol 12/29/2022 153     Triglycerides 12/29/2022 122     HDL, Direct 12/29/2022 40 (L)     LDL Calculated 12/29/2022 89     TSH 3RD GENERATON 12/29/2022 2.470     Hemoglobin A1C 12/29/2022 5.8 (H)     EAG 12/29/2022 120          Radiology Results:   No  results found.

## 2024-03-22 ENCOUNTER — TELEPHONE (OUTPATIENT)
Dept: PREADMISSION TESTING | Facility: HOSPITAL | Age: 64
End: 2024-03-22

## 2024-03-28 ENCOUNTER — HOSPITAL ENCOUNTER (OUTPATIENT)
Dept: GASTROENTEROLOGY | Facility: HOSPITAL | Age: 64
Setting detail: OUTPATIENT SURGERY
End: 2024-03-28
Payer: COMMERCIAL

## 2024-03-28 ENCOUNTER — ANESTHESIA (OUTPATIENT)
Dept: GASTROENTEROLOGY | Facility: HOSPITAL | Age: 64
End: 2024-03-28

## 2024-03-28 ENCOUNTER — ANESTHESIA EVENT (OUTPATIENT)
Dept: GASTROENTEROLOGY | Facility: HOSPITAL | Age: 64
End: 2024-03-28

## 2024-03-28 VITALS
HEIGHT: 64 IN | DIASTOLIC BLOOD PRESSURE: 81 MMHG | TEMPERATURE: 97 F | WEIGHT: 221.78 LBS | HEART RATE: 68 BPM | RESPIRATION RATE: 20 BRPM | SYSTOLIC BLOOD PRESSURE: 126 MMHG | BODY MASS INDEX: 37.86 KG/M2 | OXYGEN SATURATION: 99 %

## 2024-03-28 DIAGNOSIS — K21.9 GASTROESOPHAGEAL REFLUX DISEASE, UNSPECIFIED WHETHER ESOPHAGITIS PRESENT: ICD-10-CM

## 2024-03-28 DIAGNOSIS — R10.13 EPIGASTRIC PAIN: ICD-10-CM

## 2024-03-28 PROCEDURE — 88305 TISSUE EXAM BY PATHOLOGIST: CPT | Performed by: PATHOLOGY

## 2024-03-28 PROCEDURE — 43239 EGD BIOPSY SINGLE/MULTIPLE: CPT | Performed by: INTERNAL MEDICINE

## 2024-03-28 PROCEDURE — 88342 IMHCHEM/IMCYTCHM 1ST ANTB: CPT | Performed by: PATHOLOGY

## 2024-03-28 RX ORDER — LIDOCAINE HYDROCHLORIDE 20 MG/ML
INJECTION, SOLUTION EPIDURAL; INFILTRATION; INTRACAUDAL; PERINEURAL AS NEEDED
Status: DISCONTINUED | OUTPATIENT
Start: 2024-03-28 | End: 2024-03-28

## 2024-03-28 RX ORDER — PROPOFOL 10 MG/ML
INJECTION, EMULSION INTRAVENOUS AS NEEDED
Status: DISCONTINUED | OUTPATIENT
Start: 2024-03-28 | End: 2024-03-28

## 2024-03-28 RX ORDER — SODIUM CHLORIDE, SODIUM LACTATE, POTASSIUM CHLORIDE, CALCIUM CHLORIDE 600; 310; 30; 20 MG/100ML; MG/100ML; MG/100ML; MG/100ML
INJECTION, SOLUTION INTRAVENOUS CONTINUOUS PRN
Status: DISCONTINUED | OUTPATIENT
Start: 2024-03-28 | End: 2024-03-28

## 2024-03-28 RX ADMIN — PROPOFOL 125 MG: 10 INJECTION, EMULSION INTRAVENOUS at 07:45

## 2024-03-28 RX ADMIN — LIDOCAINE HYDROCHLORIDE 100 MG: 20 INJECTION, SOLUTION EPIDURAL; INFILTRATION; INTRACAUDAL at 07:43

## 2024-03-28 RX ADMIN — PROPOFOL 75 MG: 10 INJECTION, EMULSION INTRAVENOUS at 07:44

## 2024-03-28 RX ADMIN — PROPOFOL 30 MG: 10 INJECTION, EMULSION INTRAVENOUS at 07:48

## 2024-03-28 RX ADMIN — SODIUM CHLORIDE, SODIUM LACTATE, POTASSIUM CHLORIDE, AND CALCIUM CHLORIDE: .6; .31; .03; .02 INJECTION, SOLUTION INTRAVENOUS at 07:26

## 2024-03-28 NOTE — ANESTHESIA POSTPROCEDURE EVALUATION
Post-Op Assessment Note    CV Status:  Stable  Pain Score: 0    Pain management: adequate       Mental Status:  Sleepy and arousable   Hydration Status:  Euvolemic   PONV Controlled:  Controlled   Airway Patency:  Patent     Post Op Vitals Reviewed: Yes    No anethesia notable event occurred.    Staff: HARDEEP               BP 99/57 (03/28/24 0755)    Temp (!) 97 °F (36.1 °C) (03/28/24 0755)    Pulse 72 (03/28/24 0755)   Resp 15 (03/28/24 0755)    SpO2 94 % (03/28/24 0755)

## 2024-03-28 NOTE — ANESTHESIA PREPROCEDURE EVALUATION
Procedure:  EGD    Relevant Problems   ANESTHESIA (within normal limits)      CARDIO   (+) Atrial fibrillation (HCC) (pAF, patient reports she can feel when she goes into afib + watch to verify, 1 episode over past 6mo)   (+) Essential hypertension   (-) Chest pain      ENDO (within normal limits)      GI/HEPATIC  NPO confirmed  BMI 38.1   (+) Acid reflux disease      /RENAL (within normal limits)      HEMATOLOGY (within normal limits)      PULMONARY (within normal limits)   (-) URI (upper respiratory infection)      +metoprolol today, xarelto 3/25    Allergies   Allergen Reactions    Erythromycin     Sulfa Antibiotics     Tetracycline      Social History     Tobacco Use    Smoking status: Never    Smokeless tobacco: Never    Tobacco comments:     Former   Vaping Use    Vaping status: Never Used   Substance Use Topics    Alcohol use: No    Drug use: No     Current Outpatient Medications   Medication Instructions    Azelaic Acid 15 % cream 1 application., Topical, 2 times daily    fluticasone (FLONASE) 50 mcg/act nasal spray SPRAY 2 SPRAYS INTO EACH NOSTRIL EVERY DAY    losartan (COZAAR) 100 mg, Oral, Daily    metoprolol succinate (TOPROL-XL) 100 mg 24 hr tablet TAKE 1 TABLET BY MOUTH EVERY DAY    omeprazole (PriLOSEC) 40 MG capsule TAKE 1 CAPSULE BY MOUTH EVERY DAY    triamcinolone (KENALOG) 0.5 % cream 1 application., Apply externally, 3 times daily    Xarelto 20 mg, Oral, Daily     Lab Results   Component Value Date    WBC 8.22 12/29/2022    HGB 13.6 12/29/2022    HCT 43.0 12/29/2022     12/29/2022    SODIUM 138 12/29/2022    K 4.1 12/29/2022     12/29/2022    CO2 26 12/29/2022    BUN 16 12/29/2022    CREATININE 1.00 12/29/2022    GLUC 86 01/05/2019    HGBA1C 5.8 (H) 12/29/2022    AST 20 12/29/2022    ALT 30 12/29/2022    ALKPHOS 106 12/29/2022    TBILI 0.44 12/29/2022    ALB 3.5 12/29/2022     Vitals:    03/28/24 0702   BP: 126/74   Pulse: 85   Resp: 16   Temp: (!) 97.3 °F (36.3 °C)   SpO2: 97%        Physical Exam    Airway    Mallampati score: II  TM Distance: >3 FB  Neck ROM: full     Dental   Comment: Denies loose teeth     Cardiovascular  Cardiovascular exam normal    Pulmonary  Pulmonary exam normal     Other Findings  Portions of exam deferred due to low yield and/or unknown COVID statuspost-pubertal.      Anesthesia Plan  ASA Score- 3     Anesthesia Type- IV sedation with anesthesia with ASA Monitors.         Additional Monitors:     Airway Plan:     Comment: O2 mask, natural airway, EtCO2 monitor. Risks discussed including awareness, aspiration, drug reactions and conversion to GA..       Plan Factors-Exercise tolerance (METS): >4 METS.    Chart reviewed.   Existing labs reviewed. Patient summary reviewed.    Patient is not a current smoker.              Induction- intravenous.    Postoperative Plan-     Informed Consent- Anesthetic plan and risks discussed with patient.  I personally reviewed this patient with the CRNA. Discussed and agreed on the Anesthesia Plan with the CRNA..

## 2024-03-28 NOTE — INTERVAL H&P NOTE
H&P reviewed. After examining the patient I find no changes in the patients condition since the H&P had been written.    Vitals:    03/28/24 0702   BP: 126/74   Pulse: 85   Resp: 16   Temp: (!) 97.3 °F (36.3 °C)   SpO2: 97%

## 2024-04-02 PROCEDURE — 88305 TISSUE EXAM BY PATHOLOGIST: CPT | Performed by: PATHOLOGY

## 2024-04-02 PROCEDURE — 88342 IMHCHEM/IMCYTCHM 1ST ANTB: CPT | Performed by: PATHOLOGY

## 2024-04-09 ENCOUNTER — TELEPHONE (OUTPATIENT)
Age: 64
End: 2024-04-09

## 2024-04-09 NOTE — TELEPHONE ENCOUNTER
Patients GI provider:  Dr. Blandon    Number to return call: [phone]    Reason for call: Pt calling to speak to someone in regards to her EGD results.     Scheduled procedure/appointment date if applicable: Apt/procedure N/A

## 2024-04-10 NOTE — TELEPHONE ENCOUNTER
Please let the patient know all biopsies were benign, and if she is still having symptoms please follow-up with Tiffanie in the office

## 2024-04-11 DIAGNOSIS — R10.13 EPIGASTRIC PAIN: Primary | ICD-10-CM

## 2024-04-11 NOTE — TELEPHONE ENCOUNTER
Pt called in. Pt is aware of Dr. Blandon's message. Pt is still having same symptoms. Pt would like to discuss besides coming into the office again, what would be the next steps to assist.

## 2024-04-11 NOTE — TELEPHONE ENCOUNTER
Spoke to patient.  She continues with some reflux, epigastric discomfort sometimes after eating and at night.  She is on Omeprazole daily.  Reviewed biopsies - negative for h pylori, increased eosinophils on esophageal biopsies suggestive more likely to be from reflux esophagitis v EoE.  She denies dysphagia. No unintentional weight loss. She wants to avoid increasing the PPI and hopes to try to wean off in the future.  She does drink soda and iced tea daily.  Reviewed GERD modifications/avoidance of GERD trigger foods- recommended to stop the soda and iced tea consumption.  Will also check an abdominal ultrasound.

## 2024-07-11 DIAGNOSIS — I10 ESSENTIAL HYPERTENSION: ICD-10-CM

## 2024-07-11 RX ORDER — LOSARTAN POTASSIUM 100 MG/1
100 TABLET ORAL DAILY
Qty: 30 TABLET | Refills: 0 | Status: SHIPPED | OUTPATIENT
Start: 2024-07-11

## 2024-07-11 NOTE — TELEPHONE ENCOUNTER
Reason for call:   [x] Refill   [] Prior Auth  [] Other:     Office:   [x] PCP/Provider - ayo vargas  [] Specialty/Provider -     Medication:   Does the patient have enough for 3 days?   [] Yes   [x] No - Send as HP to POD

## 2024-07-22 DIAGNOSIS — K21.9 GASTROESOPHAGEAL REFLUX DISEASE: ICD-10-CM

## 2024-07-22 RX ORDER — OMEPRAZOLE 40 MG/1
CAPSULE, DELAYED RELEASE ORAL
Qty: 90 CAPSULE | Refills: 1 | Status: SHIPPED | OUTPATIENT
Start: 2024-07-22

## 2024-07-29 ENCOUNTER — TELEPHONE (OUTPATIENT)
Age: 64
End: 2024-07-29

## 2024-07-29 NOTE — TELEPHONE ENCOUNTER
Pt's dogs got into a fight with a ground hog on Saturday. Pt  the animals but in the process got a scratch on her foot. She is not sure where it came from but would like to know your recommendations as far as a rabies vaccine. The ground hog was sent out to get tested but she won't have results until Thursday and she is unsure how long she should wait for the vaccine.

## 2024-07-30 ENCOUNTER — HOSPITAL ENCOUNTER (EMERGENCY)
Facility: HOSPITAL | Age: 64
Discharge: HOME | End: 2024-07-30
Attending: EMERGENCY MEDICINE
Payer: COMMERCIAL

## 2024-07-30 VITALS
DIASTOLIC BLOOD PRESSURE: 86 MMHG | BODY MASS INDEX: 36.88 KG/M2 | RESPIRATION RATE: 20 BRPM | WEIGHT: 221.34 LBS | OXYGEN SATURATION: 98 % | HEART RATE: 101 BPM | HEIGHT: 65 IN | SYSTOLIC BLOOD PRESSURE: 165 MMHG | TEMPERATURE: 98.7 F

## 2024-07-30 DIAGNOSIS — S91.359A: Primary | ICD-10-CM

## 2024-07-30 PROCEDURE — 99284 EMERGENCY DEPT VISIT MOD MDM: CPT | Performed by: EMERGENCY MEDICINE

## 2024-07-30 PROCEDURE — 90471 IMMUNIZATION ADMIN: CPT

## 2024-07-30 PROCEDURE — 99282 EMERGENCY DEPT VISIT SF MDM: CPT

## 2024-07-30 PROCEDURE — 90715 TDAP VACCINE 7 YRS/> IM: CPT | Performed by: EMERGENCY MEDICINE

## 2024-07-30 RX ORDER — METRONIDAZOLE 500 MG/1
500 TABLET ORAL ONCE
Status: COMPLETED | OUTPATIENT
Start: 2024-07-30 | End: 2024-07-30

## 2024-07-30 RX ORDER — CEFUROXIME AXETIL 250 MG/1
500 TABLET ORAL ONCE
Status: COMPLETED | OUTPATIENT
Start: 2024-07-30 | End: 2024-07-30

## 2024-07-30 RX ORDER — CEFUROXIME AXETIL 250 MG/1
500 TABLET ORAL EVERY 12 HOURS SCHEDULED
Status: DISCONTINUED | OUTPATIENT
Start: 2024-07-30 | End: 2024-07-30

## 2024-07-30 RX ORDER — CEFUROXIME AXETIL 500 MG/1
500 TABLET ORAL EVERY 12 HOURS SCHEDULED
Qty: 14 TABLET | Refills: 0 | Status: SHIPPED | OUTPATIENT
Start: 2024-07-30 | End: 2024-08-06

## 2024-07-30 RX ORDER — METRONIDAZOLE 500 MG/1
500 TABLET ORAL EVERY 8 HOURS SCHEDULED
Qty: 21 TABLET | Refills: 0 | Status: SHIPPED | OUTPATIENT
Start: 2024-07-30 | End: 2024-08-06

## 2024-07-30 RX ADMIN — TETANUS TOXOID, REDUCED DIPHTHERIA TOXOID AND ACELLULAR PERTUSSIS VACCINE, ADSORBED 0.5 ML: 5; 2.5; 8; 8; 2.5 SUSPENSION INTRAMUSCULAR at 19:31

## 2024-07-30 RX ADMIN — METRONIDAZOLE 500 MG: 500 TABLET ORAL at 19:31

## 2024-07-30 RX ADMIN — CEFUROXIME AXETIL 500 MG: 250 TABLET ORAL at 19:31

## 2024-07-30 NOTE — TELEPHONE ENCOUNTER
Pt call also left a message yesterday regarding if she need a shot pt dog got into a fight with a ground hog she  the animals pt has a scratch on her foot please advise.

## 2024-07-30 NOTE — ED PROVIDER NOTES
History  Chief Complaint   Patient presents with    Medical Problem     Patient presents to ER from home requesting eval of need for rabies shot - her two dogs had a groundhog and they were fighting over it and she went to go break it up. In the process she received an injury to her foot and break in skin but she is unsure what caused it and states it was bleeding a good amount. Game commission came to house and picked up groundhog and will be testing it for rabies but this report wont come back until Thursday.      65 y/o female presents to the ED for possible ground hog bite. She states that her 2 dogs got a ground hog and when she went to break up the fight she thinks she may have gotten bit on her R foot. She states that she noticed an abrasion to the area. She reports that the groundhog got killed in the fight and now the game commissioner has it. She states that it is going for testing. However, she will not find out if it has rabies until Thursday. She states that her tetanus is not UTD. Denies any complaints or injury.       History provided by:  Patient      Prior to Admission Medications   Prescriptions Last Dose Informant Patient Reported? Taking?   Azelaic Acid 15 % cream  Self No No   Sig: Apply 1 application. topically 2 (two) times a day   Xarelto 20 MG tablet  Self Yes No   Sig: Take 20 mg by mouth daily   fluticasone (FLONASE) 50 mcg/act nasal spray  Self No No   Sig: SPRAY 2 SPRAYS INTO EACH NOSTRIL EVERY DAY   losartan (COZAAR) 100 MG tablet   No No   Sig: Take 1 tablet (100 mg total) by mouth daily   metoprolol succinate (TOPROL-XL) 100 mg 24 hr tablet  Self No No   Sig: TAKE 1 TABLET BY MOUTH EVERY DAY   omeprazole (PriLOSEC) 40 MG capsule   No No   Sig: TAKE 1 CAPSULE BY MOUTH EVERY DAY   triamcinolone (KENALOG) 0.5 % cream  Self Yes No   Sig: Apply 1 application. topically 3 (three) times a day      Facility-Administered Medications: None       Past Medical History:   Diagnosis Date    Atrial  fibrillation (HCC) 2021    Hypertension     Impaired fasting glucose     Pain        Past Surgical History:   Procedure Laterality Date    HYSTERECTOMY  12/23/2006    OOPHORECTOMY Right     unilateral    OTHER SURGICAL HISTORY      Excision of lesion Genitalia Meron last assessed 11/01/16    [place] COLONOSCOPY FLX DX W/COLLJ SPEC WHEN PFRMD N/A 7/21/2016    Procedure: COLONOSCOPY;  Surgeon: Forrest Blandon MD;  Location[facility];  Service: Gastroenterology    TUBAL LIGATION         Family History   Problem Relation Age of Onset    COPD Mother     Heart failure Mother     Hyperlipidemia Mother     Hypertension Mother     COPD Father         moderate    No Known Problems Sister     No Known Problems Maternal Grandmother     Breast cancer Paternal Grandmother 70    Cancer Maternal Aunt         Urinary bladder    No Known Problems Maternal Aunt      I have reviewed and agree with the history as documented.    E-Cigarette/Vaping    E-Cigarette Use Never User      E-Cigarette/Vaping Substances    Nicotine No     THC No     CBD No     Flavoring No     Other No     Unknown No      Social History     Tobacco Use    Smoking status: Never    Smokeless tobacco: Never    Tobacco comments:     Former   Vaping Use    Vaping status: Never Used   Substance Use Topics    Alcohol use: No    Drug use: No       Review of Systems   Constitutional:  Negative for chills and fever.   HENT:  Negative for congestion, ear pain and sore throat.    Eyes:  Negative for pain and visual disturbance.   Respiratory:  Negative for cough, shortness of breath and wheezing.    Cardiovascular:  Negative for chest pain and leg swelling.   Gastrointestinal:  Negative for abdominal pain, diarrhea, nausea and vomiting.   Genitourinary:  Negative for dysuria, frequency, hematuria and urgency.   Musculoskeletal:  Negative for neck pain and neck stiffness.   Skin:  Positive for wound. Negative for rash.   Neurological:  Negative for weakness, numbness and  headaches.   Psychiatric/Behavioral:  Negative for agitation and confusion.    All other systems reviewed and are negative.      Physical Exam  Physical Exam  Vitals and nursing note reviewed.   Constitutional:       Appearance: She is well-developed.   HENT:      Head: Normocephalic and atraumatic.   Eyes:      Pupils: Pupils are equal, round, and reactive to light.   Cardiovascular:      Rate and Rhythm: Normal rate and regular rhythm.   Pulmonary:      Effort: Pulmonary effort is normal.      Breath sounds: Normal breath sounds.   Abdominal:      General: Bowel sounds are normal.      Palpations: Abdomen is soft.   Musculoskeletal:         General: Normal range of motion.      Cervical back: Normal range of motion and neck supple.   Skin:     General: Skin is warm and dry.      Comments: Small abrasion to the right foot    Neurological:      General: No focal deficit present.      Mental Status: She is alert and oriented to person, place, and time.      Comments: No focal deficits         Vital Signs  ED Triage Vitals   Temperature Pulse Respirations Blood Pressure SpO2   07/30/24 1856 07/30/24 1854 07/30/24 1854 07/30/24 1854 07/30/24 1854   98.7 °F (37.1 °C) 101 20 165/86 98 %      Temp Source Heart Rate Source Patient Position - Orthostatic VS BP Location FiO2 (%)   07/30/24 1856 07/30/24 1854 07/30/24 1854 07/30/24 1854 --   Oral Monitor Sitting Left arm       Pain Score       --                  Vitals:    07/30/24 1854   BP: 165/86   Pulse: 101   Patient Position - Orthostatic VS: Sitting         Visual Acuity      ED Medications  Medications   tetanus-diphtheria-acellular pertussis (BOOSTRIX) IM injection 0.5 mL (0.5 mL Intramuscular Given 7/30/24 1931)   metroNIDAZOLE (FLAGYL) tablet 500 mg (500 mg Oral Given 7/30/24 1931)   cefuroxime (CEFTIN) tablet 500 mg (500 mg Oral Given 7/30/24 1931)       Diagnostic Studies  Results Reviewed       None                   No orders to display               Procedures  Procedures         ED Course                 Identification of Seniors at Risk      Flowsheet Row Most Recent Value   (ISAR) Identification of Seniors at Risk    Before the illness or injury that brought you to the Emergency, did you need someone to help you on a regular basis? 0 Filed at: 07/30/2024 1856   In the last 24 hours, have you needed more help than usual? 0 Filed at: 07/30/2024 1856   Have you been hospitalized for one or more nights during the past 6 months? 0 Filed at: 07/30/2024 1856   In general, do you see well? 1 Filed at: 07/30/2024 1856   In general, do you have serious problems with your memory? 0 Filed at: 07/30/2024 1856   Do you take more than three different medications every day? 0 Filed at: 07/30/2024 1856   ISAR Score 1 Filed at: 07/30/2024 1856                        SBIRT 22yo+      Flowsheet Row Most Recent Value   Initial Alcohol Screen: US AUDIT-C     1. How often do you have a drink containing alcohol? 0 Filed at: 07/30/2024 1857   2. How many drinks containing alcohol do you have on a typical day you are drinking?  0 Filed at: 07/30/2024 1857   3b. FEMALE Any Age, or MALE 65+: How often do you have 4 or more drinks on one occassion? 0 Filed at: 07/30/2024 1857   Audit-C Score 0 Filed at: 07/30/2024 1857   FANNY: How many times in the past year have you...    Used an illegal drug or used a prescription medication for non-medical reasons? Never Filed at: 07/30/2024 1857                      Medical Decision Making  65 y/o female with abrasion to the right foot - unsure if this is an animal bite. Animal is currently dead and in game commissioners custody. They will contact her with results on Thursday. Will give tetanus, abx, and instruct her to return for rabies series if animal tests positive. Patient states that she cannot tolerate augmentin- will give ceftin and flagyl     Risk  Prescription drug management.                 Disposition  Final diagnoses:   Animal  bite of foot     Time reflects when diagnosis was documented in both MDM as applicable and the Disposition within this note       Time User Action Codes Description Comment    7/30/2024  7:09 PM Angela Thomas Add [S91.359A] Animal bite of foot           ED Disposition       ED Disposition   Discharge    Condition   Stable    Date/Time   Tue Jul 30, 2024 1909    Comment   Sarah Calhoun discharge to home/self care.                   Follow-up Information       Follow up With Specialties Details Why Contact Info Additional Information    Jenna Knapp PA-C Family Medicine, Physician Assistant Call in 1 day for follow up within 2-3 days [address] [facility] [address]  [facility] [address]  [phone]       [facility] Emergency Medicine Go to  immediately for any new or worsening symptoms [address] [facility] [id number] [phone] [facility], [address], [place], [address]            Discharge Medication List as of 7/30/2024  7:14 PM        START taking these medications    Details   cefuroxime (CEFTIN) 500 mg tablet Take 1 tablet (500 mg total) by mouth every 12 (twelve) hours for 7 days, Starting Tue 7/30/2024, Until Tue 8/6/2024, Normal      metroNIDAZOLE (FLAGYL) 500 mg tablet Take 1 tablet (500 mg total) by mouth every 8 (eight) hours for 7 days, Starting Tue 7/30/2024, Until Tue 8/6/2024, Normal           CONTINUE these medications which have NOT CHANGED    Details   Azelaic Acid 15 % cream Apply 1 application. topically 2 (two) times a day, Starting Wed 3/8/2023, Normal      fluticasone (FLONASE) 50 mcg/act nasal spray SPRAY 2 SPRAYS INTO EACH NOSTRIL EVERY DAY, Normal      losartan (COZAAR) 100 MG tablet Take 1 tablet (100 mg total) by mouth daily, Starting Thu 7/11/2024, Normal      metoprolol succinate (TOPROL-XL) 100 mg 24 hr tablet TAKE 1 TABLET BY MOUTH EVERY DAY, Normal       omeprazole (PriLOSEC) 40 MG capsule TAKE 1 CAPSULE BY MOUTH EVERY DAY, Normal      triamcinolone (KENALOG) 0.5 % cream Apply 1 application. topically 3 (three) times a day, Starting Wed 10/25/2023, Until Thu 10/24/2024, Historical Med      Xarelto 20 MG tablet Take 20 mg by mouth daily, Starting Sat 2/18/2023, Historical Med             No discharge procedures on file.    PDMP Review       None            ED Provider  Electronically Signed by             Angela Thomas DO  07/30/24 8551

## 2024-07-31 ENCOUNTER — HOSPITAL ENCOUNTER (EMERGENCY)
Facility: HOSPITAL | Age: 64
Discharge: HOME | End: 2024-07-31
Attending: EMERGENCY MEDICINE
Payer: COMMERCIAL

## 2024-07-31 VITALS
TEMPERATURE: 97.9 F | DIASTOLIC BLOOD PRESSURE: 94 MMHG | HEART RATE: 101 BPM | RESPIRATION RATE: 18 BRPM | WEIGHT: 221 LBS | SYSTOLIC BLOOD PRESSURE: 148 MMHG | OXYGEN SATURATION: 98 % | BODY MASS INDEX: 37.35 KG/M2

## 2024-07-31 DIAGNOSIS — Z20.3 NEED FOR POST EXPOSURE PROPHYLAXIS FOR RABIES: Primary | ICD-10-CM

## 2024-07-31 PROCEDURE — 99284 EMERGENCY DEPT VISIT MOD MDM: CPT | Performed by: EMERGENCY MEDICINE

## 2024-07-31 PROCEDURE — 90375 RABIES IG IM/SC: CPT | Performed by: EMERGENCY MEDICINE

## 2024-07-31 PROCEDURE — 90675 RABIES VACCINE IM: CPT | Performed by: EMERGENCY MEDICINE

## 2024-07-31 RX ADMIN — RABIES VIRUS STRAIN PM-1503-3M ANTIGEN (PROPIOLACTONE INACTIVATED) AND WATER 1 ML: KIT at 19:47

## 2024-07-31 RX ADMIN — RABIES IMMUNE GLOBULIN (HUMAN) 2100 UNITS: 300 INJECTION, SOLUTION INFILTRATION; INTRAMUSCULAR at 19:47

## 2024-07-31 NOTE — ED PROVIDER NOTES
History  Chief Complaint   Patient presents with    Follow Up Rabies     Needs to start rabies series for ground hog exposure      Patient is a 64-year-old female with past medical history of hypertension, atrial fibrillation on Xarelto, GERD, presents to the emergency department for rabies vaccination series.  Patient states that on Saturday, her dog was going after a groundhog and had the groundhog in his mouth when she tried to separate the two.  She states she did notice that she had a scratch on her right foot, dorsal aspect and when she looked down, she saw that the groundhog blood was all over her foot.  She denies being bitten by the groundhog to her knowledge.  The groundhog was given to the game [employer] and it was tested for rabies however she was called today stating that the rabies test was inconclusive and it was recommended she start the rabies vaccination series.  Patient was seen yesterday for the same and was started on prophylactic antibiotics and her tetanus was updated but decision was made to await call from game commissioner about the rabies test prior to initiating the vaccination series.  She reports that the small wound on her right foot is healing well but at that time it was more open.  She again states she is unsure how she got that scratch on her foot.  She denies any fevers, chills, foot pain, foot redness or swelling.  Rest of review of systems is negative.      History provided by:  Patient   used: No        Prior to Admission Medications   Prescriptions Last Dose Informant Patient Reported? Taking?   Azelaic Acid 15 % cream  Self No No   Sig: Apply 1 application. topically 2 (two) times a day   Xarelto 20 MG tablet  Self Yes No   Sig: Take 20 mg by mouth daily   cefuroxime (CEFTIN) 500 mg tablet   No No   Sig: Take 1 tablet (500 mg total) by mouth every 12 (twelve) hours for 7 days   fluticasone (FLONASE) 50 mcg/act nasal spray  Self No No   Sig: SPRAY 2  SPRAYS INTO EACH NOSTRIL EVERY DAY   losartan (COZAAR) 100 MG tablet   No No   Sig: Take 1 tablet (100 mg total) by mouth daily   metoprolol succinate (TOPROL-XL) 100 mg 24 hr tablet  Self No No   Sig: TAKE 1 TABLET BY MOUTH EVERY DAY   metroNIDAZOLE (FLAGYL) 500 mg tablet   No No   Sig: Take 1 tablet (500 mg total) by mouth every 8 (eight) hours for 7 days   omeprazole (PriLOSEC) 40 MG capsule   No No   Sig: TAKE 1 CAPSULE BY MOUTH EVERY DAY   triamcinolone (KENALOG) 0.5 % cream  Self Yes No   Sig: Apply 1 application. topically 3 (three) times a day      Facility-Administered Medications: None       Past Medical History:   Diagnosis Date    Atrial fibrillation (HCC) 2021    Hypertension     Impaired fasting glucose     Pain        Past Surgical History:   Procedure Laterality Date    HYSTERECTOMY  12/23/2006    OOPHORECTOMY Right     unilateral    OTHER SURGICAL HISTORY      Excision of lesion Genitalia Bengin last assessed 11/01/16    [place] COLONOSCOPY FLX DX W/COLLJ SPEC WHEN PFRMD N/A 7/21/2016    Procedure: COLONOSCOPY;  Surgeon: Forrest Blandon MD;  Location: [facility];  Service: Gastroenterology    TUBAL LIGATION         Family History   Problem Relation Age of Onset    COPD Mother     Heart failure Mother     Hyperlipidemia Mother     Hypertension Mother     COPD Father         moderate    No Known Problems Sister     No Known Problems Maternal Grandmother     Breast cancer Paternal Grandmother 70    Cancer Maternal Aunt         Urinary bladder    No Known Problems Maternal Aunt      I have reviewed and agree with the history as documented.    E-Cigarette/Vaping    E-Cigarette Use Never User      E-Cigarette/Vaping Substances    Nicotine No     THC No     CBD No     Flavoring No     Other No     Unknown No      Social History     Tobacco Use    Smoking status: Never    Smokeless tobacco: Never    Tobacco comments:     Former   Vaping Use    Vaping status: Never Used   Substance Use Topics    Alcohol use: No     Drug use: No       Review of Systems   Constitutional:  Negative for chills and fever.   HENT:  Negative for congestion, ear pain, rhinorrhea and sore throat.    Respiratory:  Negative for cough, chest tightness, shortness of breath and wheezing.    Cardiovascular:  Negative for chest pain and palpitations.   Gastrointestinal:  Negative for abdominal pain, constipation, diarrhea, nausea and vomiting.   Genitourinary:  Negative for dysuria, flank pain, frequency and hematuria.   Musculoskeletal:  Negative for back pain and neck pain.   Skin:  Negative for color change, pallor and rash.   Allergic/Immunologic: Negative for immunocompromised state.   Neurological:  Negative for dizziness, weakness, light-headedness, numbness and headaches.   Hematological:  Negative for adenopathy. Bruises/bleeds easily.   Psychiatric/Behavioral:  Negative for confusion and decreased concentration.    All other systems reviewed and are negative.      Physical Exam  Physical Exam  Vitals and nursing note reviewed.   Constitutional:       General: She is not in acute distress.     Appearance: Normal appearance. She is well-developed. She is not ill-appearing, toxic-appearing or diaphoretic.   HENT:      Head: Normocephalic and atraumatic.      Right Ear: External ear normal.      Left Ear: External ear normal.      Mouth/Throat:      Mouth: Mucous membranes are moist.      Pharynx: Oropharynx is clear.   Eyes:      Extraocular Movements: Extraocular movements intact.      Conjunctiva/sclera: Conjunctivae normal.   Neck:      Vascular: No JVD.   Cardiovascular:      Rate and Rhythm: Normal rate and regular rhythm.      Pulses: Normal pulses.      Heart sounds: Normal heart sounds. No murmur heard.     No friction rub. No gallop.   Pulmonary:      Effort: Pulmonary effort is normal. No respiratory distress.      Breath sounds: Normal breath sounds. No wheezing, rhonchi or rales.   Abdominal:      General: There is no distension.       Palpations: Abdomen is soft.      Tenderness: There is no abdominal tenderness. There is no guarding or rebound.   Musculoskeletal:         General: No swelling or tenderness. Normal range of motion.      Cervical back: Normal range of motion and neck supple. No rigidity.   Skin:     General: Skin is warm and dry.      Coloration: Skin is not pale.      Findings: No erythema or rash.      Comments: Superficial 1 cm vertical scratch over the dorsal aspect of the right foot.  No surrounding erythema or warmth.   Neurological:      General: No focal deficit present.      Mental Status: She is alert and oriented to person, place, and time.      Sensory: No sensory deficit.      Motor: No weakness.   Psychiatric:         Mood and Affect: Mood normal.         Behavior: Behavior normal.         Vital Signs  ED Triage Vitals [07/31/24 1809]   Temperature Pulse Respirations Blood Pressure SpO2   97.9 °F (36.6 °C) 101 18 148/94 98 %      Temp src Heart Rate Source Patient Position - Orthostatic VS BP Location FiO2 (%)   -- Monitor -- -- --      Pain Score       --         Vitals:    07/31/24 1809   BP: 148/94   Pulse: 101   Resp: 18   Temp: 97.9 °F (36.6 °C)   SpO2: 98%   Weight: 100 kg (221 lb)          Visual Acuity      ED Medications  Medications   rabies vaccine, human diploid IM injection 1 mL (has no administration in time range)   rabies immune globulin, human (HyperRAB) injection 2,100 Units (has no administration in time range)       Diagnostic Studies  Results Reviewed       None                   No orders to display              Procedures  Procedures         ED Course                                 SBIRT 22yo+      Flowsheet Row Most Recent Value   Initial Alcohol Screen: US AUDIT-C     1. How often do you have a drink containing alcohol? 0 Filed at: 07/31/2024 1809   2. How many drinks containing alcohol do you have on a typical day you are drinking?  0 Filed at: 07/31/2024 1809   3b. FEMALE Any Age, or MALE  65+: How often do you have 4 or more drinks on one occassion? 0 Filed at: 07/31/2024 1809   Audit-C Score 0 Filed at: 07/31/2024 1809   FANNY: How many times in the past year have you...    Used an illegal drug or used a prescription medication for non-medical reasons? Never Filed at: 07/31/2024 1809                      Medical Decision Making  64-year-old female presents to the ED for initiation of rabies vaccination series and immunoglobulin.  She was exposed to groundhog blood and did have an open wound at the time.  The groundhog was tested for rabies but results were inconclusive per game commissioner.  Although the exposure is likely very low risk, patient feels more comfortable starting the vaccination series.  Will give immunoglobulin today and start first rabies vaccine.  Advised patient that she needs to return on day 3, day 7, day 14 for repeat rabies vaccination series.  Advised her to continue the antibiotics until completed.  Discussed ED return parameters.    Risk  Prescription drug management.                 Disposition  Final diagnoses:   Need for post exposure prophylaxis for rabies     Time reflects when diagnosis was documented in both MDM as applicable and the Disposition within this note       Time User Action Codes Description Comment    7/31/2024  7:01 PM Kristan Larson Add [Z20.3] Need for post exposure prophylaxis for rabies           ED Disposition       ED Disposition   Discharge    Condition   Stable    Date/Time   Wed Jul 31, 2024 1901    Comment   aSrah Calhoun discharge to home/self care.                   Follow-up Information       Follow up With Specialties Details Why Contact Info Additional Information    [facility] Emergency Medicine Go to  on day 3 (8/3), day 7 (8/7) and day 14 (8/14) for repeat rabies vaccinations [address] [facility] [id number]  [phone] [facility],  [address], [place], [address]            Patient's Medications   Discharge Prescriptions    No medications on file       No discharge procedures on file.    PDMP Review       None            ED Provider  Electronically Signed by             Kristan Larson DO  07/31/24 6720

## 2024-08-03 ENCOUNTER — HOSPITAL ENCOUNTER (EMERGENCY)
Facility: HOSPITAL | Age: 64
Discharge: HOME | End: 2024-08-03
Attending: EMERGENCY MEDICINE
Payer: COMMERCIAL

## 2024-08-03 VITALS
WEIGHT: 218.26 LBS | TEMPERATURE: 97.5 F | RESPIRATION RATE: 20 BRPM | OXYGEN SATURATION: 96 % | HEART RATE: 99 BPM | BODY MASS INDEX: 36.88 KG/M2 | DIASTOLIC BLOOD PRESSURE: 82 MMHG | SYSTOLIC BLOOD PRESSURE: 148 MMHG

## 2024-08-03 DIAGNOSIS — Z23 NEED FOR RABIES VACCINATION: Primary | ICD-10-CM

## 2024-08-03 PROCEDURE — 99282 EMERGENCY DEPT VISIT SF MDM: CPT | Performed by: EMERGENCY MEDICINE

## 2024-08-03 PROCEDURE — 90675 RABIES VACCINE IM: CPT | Performed by: EMERGENCY MEDICINE

## 2024-08-03 PROCEDURE — 90471 IMMUNIZATION ADMIN: CPT

## 2024-08-03 RX ADMIN — Medication 1 ML: at 08:45

## 2024-08-03 NOTE — DISCHARGE INSTRUCTIONS
Continue vaccination schedule as instructed before.    A  personal message from Dr. Vasu Jordan,  Thank you so much for allowing me to care for you today.    I pride myself in the care and attention I give all my patients.  I hope you were a witness to this tonight.   If for any reason your condition does not improve or worsens, or you have a question that was not answered during your visit you can feel free to text me on my personal phone #  # [phone].   I will answer to your message and continue your care past your emergency room visit.     Please understand that although you are being discharged because your condition has been deemed stable and able to be managed on an outpatient setting. However your condition may worsen as part of the natural progression of the illness/condition, if this occurs please come back to the emergency department for a repeat evaluation.

## 2024-08-04 DIAGNOSIS — I10 ESSENTIAL HYPERTENSION: ICD-10-CM

## 2024-08-04 NOTE — ED PROVIDER NOTES
"History  Chief Complaint   Patient presents with    Follow Up Rabies     Patient \"arrived to ED for 2nd rabies vaccine\"     Sarah Calhoun is 64 y.o. female . Patient presents with:  Follow Up Rabies: Patient \"arrived to ED for 2nd rabies vaccine\"  .  Patient clinically well.  Does not have any other complaints.  Review of systems: No fever no chills no nausea no vomiting.  Feeling well today.  Physical exam: Patient awake and alert no significant distress.  No respiratory distress.  Ambulating without difficulty.  Neurologically intact.  Patient clinical presentation is benign.  Meaning patient's vital signs are normal and stable ED Triage Vitals [08/03/24 0823]  Temperature: 97.5 °F (36.4 °C)  Pulse: 99  Respirations: 20  Blood Pressure: 148/82  SpO2: 96 %  Temp Source: Temporal  Heart Rate Source: Monitor  Patient Position - Orthostatic VS: Sitting  BP Location: Left arm  FiO2 (%): n/a  Pain Score: n/a.  Patient in no distress.  Chief complaint, vital signs, physical examination does not suggest an acute medical emergency at this time.   Patient will receive rabies vaccination # 2, then discharged home.        History provided by:  Patient   used: No        Prior to Admission Medications   Prescriptions Last Dose Informant Patient Reported? Taking?   Azelaic Acid 15 % cream  Self No No   Sig: Apply 1 application. topically 2 (two) times a day   Xarelto 20 MG tablet  Self Yes No   Sig: Take 20 mg by mouth daily   cefuroxime (CEFTIN) 500 mg tablet   No No   Sig: Take 1 tablet (500 mg total) by mouth every 12 (twelve) hours for 7 days   fluticasone (FLONASE) 50 mcg/act nasal spray  Self No No   Sig: SPRAY 2 SPRAYS INTO EACH NOSTRIL EVERY DAY   losartan (COZAAR) 100 MG tablet   No No   Sig: Take 1 tablet (100 mg total) by mouth daily   metoprolol succinate (TOPROL-XL) 100 mg 24 hr tablet  Self No No   Sig: TAKE 1 TABLET BY MOUTH EVERY DAY   metroNIDAZOLE (FLAGYL) 500 mg tablet   No No   Sig: " Take 1 tablet (500 mg total) by mouth every 8 (eight) hours for 7 days   omeprazole (PriLOSEC) 40 MG capsule   No No   Sig: TAKE 1 CAPSULE BY MOUTH EVERY DAY   triamcinolone (KENALOG) 0.5 % cream  Self Yes No   Sig: Apply 1 application. topically 3 (three) times a day      Facility-Administered Medications: None       Past Medical History:   Diagnosis Date    Atrial fibrillation (HCC) 2021    Hypertension     Impaired fasting glucose     Pain        Past Surgical History:   Procedure Laterality Date    HYSTERECTOMY  12/23/2006    OOPHORECTOMY Right     unilateral    OTHER SURGICAL HISTORY      Excision of lesion Genitalia Bengin last assessed 11/01/16    [place] COLONOSCOPY FLX DX W/COLLJ SPEC WHEN PFRMD N/A 7/21/2016    Procedure: COLONOSCOPY;  Surgeon: Forrest Blandon MD;  Location: [facility];  Service: Gastroenterology    TUBAL LIGATION         Family History   Problem Relation Age of Onset    COPD Mother     Heart failure Mother     Hyperlipidemia Mother     Hypertension Mother     COPD Father         moderate    No Known Problems Sister     No Known Problems Maternal Grandmother     Breast cancer Paternal Grandmother 70    Cancer Maternal Aunt         Urinary bladder    No Known Problems Maternal Aunt      I have reviewed and agree with the history as documented.    E-Cigarette/Vaping    E-Cigarette Use Never User      E-Cigarette/Vaping Substances    Nicotine No     THC No     CBD No     Flavoring No     Other No     Unknown No      Social History     Tobacco Use    Smoking status: Never    Smokeless tobacco: Never    Tobacco comments:     Former   Vaping Use    Vaping status: Never Used   Substance Use Topics    Alcohol use: No    Drug use: No       Review of Systems    Physical Exam  Physical Exam    Vital Signs  ED Triage Vitals [08/03/24 0823]   Temperature Pulse Respirations Blood Pressure SpO2   97.5 °F (36.4 °C) 99 20 148/82 96 %      Temp Source Heart Rate Source Patient Position - Orthostatic VS BP  Location FiO2 (%)   Temporal Monitor Sitting Left arm --      Pain Score       --           Vitals:    08/03/24 0823   BP: 148/82   Pulse: 99   Patient Position - Orthostatic VS: Sitting         Visual Acuity      ED Medications  Medications   rabies vaccine, human diploid IM injection 1 mL (1 mL Intramuscular Given 8/3/24 0845)       Diagnostic Studies  Results Reviewed       None                   No orders to display              Procedures  Procedures         ED Course                                 SBIRT 20yo+      Flowsheet Row Most Recent Value   Initial Alcohol Screen: US AUDIT-C     1. How often do you have a drink containing alcohol? 0 Filed at: 08/03/2024 0830   2. How many drinks containing alcohol do you have on a typical day you are drinking?  0 Filed at: 08/03/2024 0830   3a. Male UNDER 65: How often do you have five or more drinks on one occasion? 0 Filed at: 08/03/2024 0830   3b. FEMALE Any Age, or MALE 65+: How often do you have 4 or more drinks on one occassion? 0 Filed at: 08/03/2024 0830   Audit-C Score 0 Filed at: 08/03/2024 0830   FANNY: How many times in the past year have you...    Used an illegal drug or used a prescription medication for non-medical reasons? Never Filed at: 08/03/2024 0830                      Medical Decision Making  Problems Addressed:  Need for rabies vaccination: acute illness or injury    Risk  Prescription drug management.                 Disposition  Final diagnoses:   Need for rabies vaccination     Time reflects when diagnosis was documented in both MDM as applicable and the Disposition within this note       Time User Action Codes Description Comment    8/3/2024  8:33 AM Vasu Jordan Add [Z23] Need for rabies vaccination           ED Disposition       ED Disposition   Discharge    Condition   Stable    Date/Time   Sat Aug 3, 2024 0849    Comment   Sarah Calhoun discharge to home/self care.                   Follow-up Information    None         Discharge  Medication List as of 8/3/2024  8:34 AM        CONTINUE these medications which have NOT CHANGED    Details   Azelaic Acid 15 % cream Apply 1 application. topically 2 (two) times a day, Starting Wed 3/8/2023, Normal      cefuroxime (CEFTIN) 500 mg tablet Take 1 tablet (500 mg total) by mouth every 12 (twelve) hours for 7 days, Starting Tue 7/30/2024, Until Tue 8/6/2024, Normal      fluticasone (FLONASE) 50 mcg/act nasal spray SPRAY 2 SPRAYS INTO EACH NOSTRIL EVERY DAY, Normal      losartan (COZAAR) 100 MG tablet Take 1 tablet (100 mg total) by mouth daily, Starting Thu 7/11/2024, Normal      metoprolol succinate (TOPROL-XL) 100 mg 24 hr tablet TAKE 1 TABLET BY MOUTH EVERY DAY, Normal      metroNIDAZOLE (FLAGYL) 500 mg tablet Take 1 tablet (500 mg total) by mouth every 8 (eight) hours for 7 days, Starting Tue 7/30/2024, Until Tue 8/6/2024, Normal      omeprazole (PriLOSEC) 40 MG capsule TAKE 1 CAPSULE BY MOUTH EVERY DAY, Normal      triamcinolone (KENALOG) 0.5 % cream Apply 1 application. topically 3 (three) times a day, Starting Wed 10/25/2023, Until Thu 10/24/2024, Historical Med      Xarelto 20 MG tablet Take 20 mg by mouth daily, Starting Sat 2/18/2023, Historical Med             No discharge procedures on file.    PDMP Review       None            ED Provider  Electronically Signed by             Vasu Jordan MD  08/04/24 0952       Vasu Jordan MD  08/04/24 0952

## 2024-08-05 RX ORDER — LOSARTAN POTASSIUM 100 MG/1
100 TABLET ORAL DAILY
Qty: 90 TABLET | Refills: 1 | Status: SHIPPED | OUTPATIENT
Start: 2024-08-05

## 2024-08-07 ENCOUNTER — HOSPITAL ENCOUNTER (EMERGENCY)
Facility: HOSPITAL | Age: 64
Discharge: HOME | End: 2024-08-07
Attending: EMERGENCY MEDICINE
Payer: COMMERCIAL

## 2024-08-07 VITALS
OXYGEN SATURATION: 97 % | SYSTOLIC BLOOD PRESSURE: 148 MMHG | RESPIRATION RATE: 15 BRPM | HEART RATE: 90 BPM | TEMPERATURE: 98.5 F | DIASTOLIC BLOOD PRESSURE: 73 MMHG

## 2024-08-07 DIAGNOSIS — Z20.3 RABIES EXPOSURE: Primary | ICD-10-CM

## 2024-08-07 PROCEDURE — 90471 IMMUNIZATION ADMIN: CPT

## 2024-08-07 PROCEDURE — 90675 RABIES VACCINE IM: CPT | Performed by: EMERGENCY MEDICINE

## 2024-08-07 PROCEDURE — 99282 EMERGENCY DEPT VISIT SF MDM: CPT | Performed by: EMERGENCY MEDICINE

## 2024-08-07 RX ADMIN — RABIES VIRUS STRAIN PM-1503-3M ANTIGEN (PROPIOLACTONE INACTIVATED) AND WATER 1 ML: KIT at 08:48

## 2024-08-07 NOTE — ED PROVIDER NOTES
History  Chief Complaint   Patient presents with    Follow Up Rabies   Patient is a 64-year-old female presents for rabies vaccine.  HPI apparently exposure to groundhog.    Prior to Admission Medications   Prescriptions Last Dose Informant Patient Reported? Taking?   Azelaic Acid 15 % cream  Self No No   Sig: Apply 1 application. topically 2 (two) times a day   Xarelto 20 MG tablet  Self Yes No   Sig: Take 20 mg by mouth daily   cefuroxime (CEFTIN) 500 mg tablet   No No   Sig: Take 1 tablet (500 mg total) by mouth every 12 (twelve) hours for 7 days   fluticasone (FLONASE) 50 mcg/act nasal spray  Self No No   Sig: SPRAY 2 SPRAYS INTO EACH NOSTRIL EVERY DAY   losartan (COZAAR) 100 MG tablet   No No   Sig: TAKE 1 TABLET BY MOUTH EVERY DAY   metoprolol succinate (TOPROL-XL) 100 mg 24 hr tablet  Self No No   Sig: TAKE 1 TABLET BY MOUTH EVERY DAY   metroNIDAZOLE (FLAGYL) 500 mg tablet   No No   Sig: Take 1 tablet (500 mg total) by mouth every 8 (eight) hours for 7 days   omeprazole (PriLOSEC) 40 MG capsule   No No   Sig: TAKE 1 CAPSULE BY MOUTH EVERY DAY   triamcinolone (KENALOG) 0.5 % cream  Self Yes No   Sig: Apply 1 application. topically 3 (three) times a day      Facility-Administered Medications: None       Past Medical History:   Diagnosis Date    Atrial fibrillation (HCC) 2021    Hypertension     Impaired fasting glucose     Pain        Past Surgical History:   Procedure Laterality Date    HYSTERECTOMY  12/23/2006    OOPHORECTOMY Right     unilateral    OTHER SURGICAL HISTORY      Excision of lesion Genitalia Bengin last assessed 11/01/16    [place] COLONOSCOPY FLX DX W/COLLJ SPEC WHEN PFRMD N/A 7/21/2016    Procedure: COLONOSCOPY;  Surgeon: Forrest Blandon MD;  Location: [facility];  Service: Gastroenterology    TUBAL LIGATION         Family History   Problem Relation Age of Onset    COPD Mother     Heart failure Mother     Hyperlipidemia Mother     Hypertension Mother     COPD Father         moderate    No Known  Problems Sister     No Known Problems Maternal Grandmother     Breast cancer Paternal Grandmother 70    Cancer Maternal Aunt         Urinary bladder    No Known Problems Maternal Aunt      I have reviewed and agree with the history as documented.    E-Cigarette/Vaping    E-Cigarette Use Never User      E-Cigarette/Vaping Substances    Nicotine No     THC No     CBD No     Flavoring No     Other No     Unknown No      Social History     Tobacco Use    Smoking status: Never    Smokeless tobacco: Never    Tobacco comments:     Former   Vaping Use    Vaping status: Never Used   Substance Use Topics    Alcohol use: No    Drug use: No       Review of Systems    Physical Exam  Physical Exam  Constitutional:       Appearance: Normal appearance.   Neurological:      Mental Status: She is alert.         Vital Signs  ED Triage Vitals   Temperature Pulse Respirations Blood Pressure SpO2   08/07/24 0841 08/07/24 0841 08/07/24 0849 08/07/24 0841 08/07/24 0841   98.5 °F (36.9 °C) 90 15 148/73 97 %      Temp src Heart Rate Source Patient Position - Orthostatic VS BP Location FiO2 (%)   -- 08/07/24 0841 08/07/24 0841 08/07/24 0841 --    Monitor Sitting Left arm       Pain Score       --                  Vitals:    08/07/24 0841   BP: 148/73   Pulse: 90   Patient Position - Orthostatic VS: Sitting         Visual Acuity      ED Medications  Medications   rabies vaccine, human diploid IM injection 1 mL (1 mL Intramuscular Given 8/7/24 0848)       Diagnostic Studies  Results Reviewed       None                   No orders to display              Procedures  Procedures         ED Course                                               Medical Decision Making  Risk  Prescription drug management.                 Disposition  Final diagnoses:   Rabies exposure     Time reflects when diagnosis was documented in both MDM as applicable and the Disposition within this note       Time User Action Codes Description Comment    8/7/2024  8:37 AM  Favini, Peter Add [Z20.3] Rabies exposure           ED Disposition       ED Disposition   Discharge    Condition   Stable    Date/Time   Wed Aug 7, 2024  8:37 AM    Comment   Sarah Calhoun discharge to home/self care.                   Follow-up Information    None         Discharge Medication List as of 8/7/2024  8:38 AM        CONTINUE these medications which have NOT CHANGED    Details   Azelaic Acid 15 % cream Apply 1 application. topically 2 (two) times a day, Starting Wed 3/8/2023, Normal      fluticasone (FLONASE) 50 mcg/act nasal spray SPRAY 2 SPRAYS INTO EACH NOSTRIL EVERY DAY, Normal      losartan (COZAAR) 100 MG tablet TAKE 1 TABLET BY MOUTH EVERY DAY, Starting Mon 8/5/2024, Normal      metoprolol succinate (TOPROL-XL) 100 mg 24 hr tablet TAKE 1 TABLET BY MOUTH EVERY DAY, Normal      omeprazole (PriLOSEC) 40 MG capsule TAKE 1 CAPSULE BY MOUTH EVERY DAY, Normal      triamcinolone (KENALOG) 0.5 % cream Apply 1 application. topically 3 (three) times a day, Starting Wed 10/25/2023, Until Thu 10/24/2024, Historical Med      Xarelto 20 MG tablet Take 20 mg by mouth daily, Starting Sat 2/18/2023, Historical Med           STOP taking these medications       cefuroxime (CEFTIN) 500 mg tablet Comments:   Reason for Stopping:         metroNIDAZOLE (FLAGYL) 500 mg tablet Comments:   Reason for Stopping:               No discharge procedures on file.    PDMP Review       None            ED Provider  Electronically Signed by             Orlando Feliciano MD  08/07/24 0947

## 2024-08-13 ENCOUNTER — APPOINTMENT (OUTPATIENT)
Dept: LAB | Facility: HOSPITAL | Age: 64
End: 2024-08-13

## 2024-08-13 ENCOUNTER — OFFICE VISIT (OUTPATIENT)
Dept: FAMILY MEDICINE CLINIC | Facility: CLINIC | Age: 64
End: 2024-08-13
Payer: COMMERCIAL

## 2024-08-13 VITALS
HEART RATE: 88 BPM | RESPIRATION RATE: 18 BRPM | BODY MASS INDEX: 36.46 KG/M2 | HEIGHT: 65 IN | DIASTOLIC BLOOD PRESSURE: 70 MMHG | OXYGEN SATURATION: 98 % | WEIGHT: 218.8 LBS | SYSTOLIC BLOOD PRESSURE: 134 MMHG

## 2024-08-13 DIAGNOSIS — R19.7 DIARRHEA, UNSPECIFIED TYPE: ICD-10-CM

## 2024-08-13 DIAGNOSIS — R19.7 DIARRHEA, UNSPECIFIED TYPE: Primary | ICD-10-CM

## 2024-08-13 DIAGNOSIS — R73.01 IMPAIRED FASTING GLUCOSE: ICD-10-CM

## 2024-08-13 DIAGNOSIS — E66.01 MORBID OBESITY (HCC): ICD-10-CM

## 2024-08-13 PROCEDURE — 99213 OFFICE O/P EST LOW 20 MIN: CPT | Performed by: NURSE PRACTITIONER

## 2024-08-13 NOTE — PROGRESS NOTES
Ambulatory Visit  Name: Sarah Calhoun      : 1960      MRN: [id number]  Encounter Provider: MATTI Jasso  Encounter Date: 2024   Encounter department: [facility] [address] [place] [address]    Assessment & Plan   1. Diarrhea, unspecified type  Comments:  Likely from recent antibiotic use.  Advised bland diet, Imodium as needed.  Will obtain stool samples.  Advised to increase hydration.  Orders:  -     Clostridium difficile toxin by PCR with EIA; Future  -     Stool culture; Future  -     H. pylori antigen, stool; Future       History of Present Illness     Patient presents for concerns of diarrhea. This started one week ago. It has been morning and night, but not all day. She feels a cramp and has to go. The stool is semi formed. No pain, no blood. She recently had rabies vaccine and on 2 antibiotics for dog bite. She finished the antibiotics on Thursday.         Review of Systems   Constitutional:  Positive for appetite change and fatigue. Negative for chills, diaphoresis and fever.   HENT:  Negative for ear pain and sore throat.    Eyes:  Negative for pain and visual disturbance.   Respiratory:  Negative for cough and shortness of breath.    Cardiovascular:  Negative for chest pain and palpitations.   Gastrointestinal:  Positive for diarrhea. Negative for abdominal pain, blood in stool, constipation, nausea and vomiting.   Genitourinary:  Negative for dysuria and hematuria.   Musculoskeletal:  Negative for arthralgias and back pain.   Skin:  Negative for color change and rash.   Neurological:  Negative for dizziness, seizures, syncope, light-headedness and headaches.   Psychiatric/Behavioral:  Positive for sleep disturbance.    All other systems reviewed and are negative.    Current Outpatient Medications on File Prior to Visit   Medication Sig Dispense Refill    Azelaic Acid 15 % cream Apply 1 application. topically 2 (two) times a day 50 g 4    fluticasone (FLONASE) 50  "mcg/act nasal spray SPRAY 2 SPRAYS INTO EACH NOSTRIL EVERY DAY 48 mL 0    losartan (COZAAR) 100 MG tablet TAKE 1 TABLET BY MOUTH EVERY DAY 90 tablet 1    metoprolol succinate (TOPROL-XL) 100 mg 24 hr tablet TAKE 1 TABLET BY MOUTH EVERY DAY 90 tablet 1    omeprazole (PriLOSEC) 40 MG capsule TAKE 1 CAPSULE BY MOUTH EVERY DAY 90 capsule 1    triamcinolone (KENALOG) 0.5 % cream Apply 1 application. topically 3 (three) times a day      Xarelto 20 MG tablet Take 20 mg by mouth daily       No current facility-administered medications on file prior to visit.      Objective     /70 (BP Location: Left arm, Patient Position: Sitting)   Pulse 88   Resp 18   Ht 5' 4.5\" (1.638 m)   Wt 99.2 kg (218 lb 12.8 oz)   SpO2 98%   BMI 36.98 kg/m²     Physical Exam  Vitals and nursing note reviewed.   Constitutional:       General: She is not in acute distress.     Appearance: She is well-developed.   HENT:      Head: Normocephalic and atraumatic.   Eyes:      Conjunctiva/sclera: Conjunctivae normal.   Cardiovascular:      Rate and Rhythm: Normal rate and regular rhythm.      Heart sounds: No murmur heard.  Pulmonary:      Effort: Pulmonary effort is normal. No respiratory distress.      Breath sounds: Normal breath sounds.   Abdominal:      Palpations: Abdomen is soft.      Tenderness: There is no abdominal tenderness.   Musculoskeletal:         General: No swelling.      Cervical back: Neck supple.   Skin:     General: Skin is warm and dry.      Capillary Refill: Capillary refill takes less than 2 seconds.   Neurological:      Mental Status: She is alert.   Psychiatric:         Mood and Affect: Mood normal.       Administrative Statements   I have spent a total time of 15 minutes in caring for this patient on the day of the visit/encounter including Documenting in the medical record, Reviewing / ordering tests, medicine, procedures  , and Obtaining or reviewing history  .        "

## 2024-08-14 ENCOUNTER — HOSPITAL ENCOUNTER (EMERGENCY)
Facility: HOSPITAL | Age: 64
Discharge: HOME | End: 2024-08-14
Attending: EMERGENCY MEDICINE
Payer: COMMERCIAL

## 2024-08-14 ENCOUNTER — APPOINTMENT (OUTPATIENT)
Dept: LAB | Facility: HOSPITAL | Age: 64
End: 2024-08-14
Payer: COMMERCIAL

## 2024-08-14 VITALS
DIASTOLIC BLOOD PRESSURE: 77 MMHG | SYSTOLIC BLOOD PRESSURE: 138 MMHG | WEIGHT: 218 LBS | RESPIRATION RATE: 17 BRPM | OXYGEN SATURATION: 100 % | HEART RATE: 90 BPM | BODY MASS INDEX: 36.84 KG/M2 | TEMPERATURE: 97.8 F

## 2024-08-14 DIAGNOSIS — Z23 ENCOUNTER FOR REPEAT ADMINISTRATION OF RABIES VACCINATION: Primary | ICD-10-CM

## 2024-08-14 DIAGNOSIS — R19.7 DIARRHEA, UNSPECIFIED TYPE: Primary | ICD-10-CM

## 2024-08-14 LAB
C COLI+JEJUNI TUF STL QL NAA+PROBE: NEGATIVE
C DIFF TOX A+B STL QL IA: NEGATIVE
C DIFF TOX GENS STL QL NAA+PROBE: POSITIVE
EC STX1+STX2 GENES STL QL NAA+PROBE: NEGATIVE
SALMONELLA SP SPAO STL QL NAA+PROBE: NEGATIVE
SHIGELLA SP+EIEC IPAH STL QL NAA+PROBE: NEGATIVE

## 2024-08-14 PROCEDURE — 90471 IMMUNIZATION ADMIN: CPT

## 2024-08-14 PROCEDURE — 90675 RABIES VACCINE IM: CPT | Performed by: EMERGENCY MEDICINE

## 2024-08-14 PROCEDURE — 87338 HPYLORI STOOL AG IA: CPT

## 2024-08-14 PROCEDURE — 87505 NFCT AGENT DETECTION GI: CPT

## 2024-08-14 PROCEDURE — 99282 EMERGENCY DEPT VISIT SF MDM: CPT | Performed by: EMERGENCY MEDICINE

## 2024-08-14 RX ADMIN — RABIES VIRUS STRAIN PM-1503-3M ANTIGEN (PROPIOLACTONE INACTIVATED) AND WATER 1 ML: KIT at 07:47

## 2024-08-14 NOTE — ED PROVIDER NOTES
History  Chief Complaint   Patient presents with    Follow Up Rabies     Here for last rabies vaccine. No issues to report since last.      63 y/o female presents to the ED for 4th rabies vaccine. She denies any issues with her prior vaccines. No other complaints.       History provided by:  Patient      Prior to Admission Medications   Prescriptions Last Dose Informant Patient Reported? Taking?   Azelaic Acid 15 % cream  Self No No   Sig: Apply 1 application. topically 2 (two) times a day   Xarelto 20 MG tablet  Self Yes No   Sig: Take 20 mg by mouth daily   fluticasone (FLONASE) 50 mcg/act nasal spray  Self No No   Sig: SPRAY 2 SPRAYS INTO EACH NOSTRIL EVERY DAY   losartan (COZAAR) 100 MG tablet   No No   Sig: TAKE 1 TABLET BY MOUTH EVERY DAY   metoprolol succinate (TOPROL-XL) 100 mg 24 hr tablet  Self No No   Sig: TAKE 1 TABLET BY MOUTH EVERY DAY   omeprazole (PriLOSEC) 40 MG capsule   No No   Sig: TAKE 1 CAPSULE BY MOUTH EVERY DAY   triamcinolone (KENALOG) 0.5 % cream  Self Yes No   Sig: Apply 1 application. topically 3 (three) times a day      Facility-Administered Medications: None       Past Medical History:   Diagnosis Date    Atrial fibrillation (HCC) 2021    Hypertension     Impaired fasting glucose     Pain        Past Surgical History:   Procedure Laterality Date    HYSTERECTOMY  12/23/2006    OOPHORECTOMY Right     unilateral    OTHER SURGICAL HISTORY      Excision of lesion Genitalia Bengin last assessed 11/01/16    [place] COLONOSCOPY FLX DX W/COLLJ SPEC WHEN PFRMD N/A 7/21/2016    Procedure: COLONOSCOPY;  Surgeon: Forrest Blandon MD;  [facility]: [facility];  Service: Gastroenterology    TUBAL LIGATION         Family History   Problem Relation Age of Onset    COPD Mother     Heart failure Mother     Hyperlipidemia Mother     Hypertension Mother     COPD Father         moderate    No Known Problems Sister     No Known Problems Maternal Grandmother     Breast cancer Paternal Grandmother 70    Cancer  Maternal Aunt         Urinary bladder    No Known Problems Maternal Aunt      I have reviewed and agree with the history as documented.    E-Cigarette/Vaping    E-Cigarette Use Never User      E-Cigarette/Vaping Substances    Nicotine No     THC No     CBD No     Flavoring No     Other No     Unknown No      Social History     Tobacco Use    Smoking status: Never    Smokeless tobacco: Never    Tobacco comments:     Former   Vaping Use    Vaping status: Never Used   Substance Use Topics    Alcohol use: No    Drug use: No       Review of Systems   Constitutional:  Negative for chills and fever.   HENT:  Negative for congestion, ear pain and sore throat.    Eyes:  Negative for pain and visual disturbance.   Respiratory:  Negative for cough, shortness of breath and wheezing.    Cardiovascular:  Negative for chest pain and leg swelling.   Gastrointestinal:  Negative for abdominal pain, diarrhea, nausea and vomiting.   Genitourinary:  Negative for dysuria, frequency, hematuria and urgency.   Musculoskeletal:  Negative for neck pain and neck stiffness.   Skin:  Negative for rash and wound.   Neurological:  Negative for weakness, numbness and headaches.   Psychiatric/Behavioral:  Negative for agitation and confusion.    All other systems reviewed and are negative.      Physical Exam  Physical Exam  Vitals and nursing note reviewed.   Constitutional:       Appearance: She is well-developed.   HENT:      Head: Normocephalic and atraumatic.   Eyes:      Pupils: Pupils are equal, round, and reactive to light.   Cardiovascular:      Rate and Rhythm: Normal rate and regular rhythm.   Pulmonary:      Effort: Pulmonary effort is normal.      Breath sounds: Normal breath sounds.   Abdominal:      General: Bowel sounds are normal.      Palpations: Abdomen is soft.   Musculoskeletal:         General: Normal range of motion.      Cervical back: Normal range of motion and neck supple.   Skin:     General: Skin is warm and dry.    Neurological:      General: No focal deficit present.      Mental Status: She is alert and oriented to person, place, and time.      Comments: No focal deficits         Vital Signs  ED Triage Vitals [08/14/24 0739]   Temperature Pulse Respirations Blood Pressure SpO2   97.8 °F (36.6 °C) 90 17 138/77 100 %      Temp Source Heart Rate Source Patient Position - Orthostatic VS BP Location FiO2 (%)   Oral Monitor Sitting Right arm --      Pain Score       No Pain           Vitals:    08/14/24 0739   BP: 138/77   Pulse: 90   Patient Position - Orthostatic VS: Sitting         Visual Acuity      ED Medications  Medications   rabies vaccine, human diploid IM injection 1 mL (1 mL Intramuscular Given 8/14/24 0747)       Diagnostic Studies  Results Reviewed       None                   No orders to display              Procedures  Procedures         ED Course                                               Medical Decision Making  63 y/o female presents for rabies vaccine- will administer and d/c.     Risk  Prescription drug management.                 Disposition  Final diagnoses:   Encounter for repeat administration of rabies vaccination     Time reflects when diagnosis was documented in both MDM as applicable and the Disposition within this note       Time User Action Codes Description Comment    8/14/2024  7:42 AM Angela Thomas Add [Z23] Encounter for repeat administration of rabies vaccination           ED Disposition       ED Disposition   Discharge    Condition   Stable    Date/Time   Wed Aug 14, 2024  7:40 AM    Comment   Sarah Calhoun discharge to home/self care.                   Follow-up Information       Follow up With Specialties Details Why Contact Info Additional Information    Jenna Knapp PA-C Family Medicine, Physician Assistant Call in 1 day for follow up within 2-3 days [address] [facility] [address]  [place] [address] [phone]       [facility] Emergency [facility]  Emergency Medicine Go to  immediately for any new or worsening symptoms [address] [facility] [id number] [phone] [facility] Emergency Department, [address] [place], [place], [address]            Discharge Medication List as of 8/14/2024  7:44 AM        CONTINUE these medications which have NOT CHANGED    Details   Azelaic Acid 15 % cream Apply 1 application. topically 2 (two) times a day, Starting Wed 3/8/2023, Normal      fluticasone (FLONASE) 50 mcg/act nasal spray SPRAY 2 SPRAYS INTO EACH NOSTRIL EVERY DAY, Normal      losartan (COZAAR) 100 MG tablet TAKE 1 TABLET BY MOUTH EVERY DAY, Starting Mon 8/5/2024, Normal      metoprolol succinate (TOPROL-XL) 100 mg 24 hr tablet TAKE 1 TABLET BY MOUTH EVERY DAY, Normal      omeprazole (PriLOSEC) 40 MG capsule TAKE 1 CAPSULE BY MOUTH EVERY DAY, Normal      triamcinolone (KENALOG) 0.5 % cream Apply 1 application. topically 3 (three) times a day, Starting Wed 10/25/2023, Until Thu 10/24/2024, Historical Med      Xarelto 20 MG tablet Take 20 mg by mouth daily, Starting Sat 2/18/2023, Historical Med             No discharge procedures on file.    PDMP Review       None            ED Provider  Electronically Signed by             Angela Thomas DO  08/14/24 0814

## 2024-08-15 ENCOUNTER — TELEPHONE (OUTPATIENT)
Age: 64
End: 2024-08-15

## 2024-08-15 DIAGNOSIS — A04.72 C. DIFFICILE DIARRHEA: Primary | ICD-10-CM

## 2024-08-15 LAB — H PYLORI AG STL QL IA: NEGATIVE

## 2024-08-15 RX ORDER — VANCOMYCIN HYDROCHLORIDE 125 MG/1
125 CAPSULE ORAL 4 TIMES DAILY
Qty: 40 CAPSULE | Refills: 0 | Status: SHIPPED | OUTPATIENT
Start: 2024-08-15 | End: 2024-08-25

## 2024-08-15 NOTE — TELEPHONE ENCOUNTER
Pt called to state she rec'd notification from the pharmacy that there's a medication waiting for her.  States she wanted to see then if her lab results are available.  Relayed the following to pt:     Brandon Roeara,  Unfortunately, you do have C. difficile with this is a bacteria that is causing your diarrhea.  We do treat this with 10 days of vancomycin.  I sent this to your pharmacy for you.  I would advise starting on a probiotic daily.  I would advise using your own bathroom if you can and of course great handwashing and disinfecting so you do not spread this to others.  Please let me know if you have any questions.  Daja Donohue     Pt verbalized understanding and states she will  the medication.  Pt states she just found out too that her mom tested positive for Covid and she was visiting her last night.  Advised pt to  some home covid tests since she's going to the pharmacy anyway.  Advised to not test for covid unless she develops some s/s.  Pt agreeable with plan.

## 2024-08-27 ENCOUNTER — TELEPHONE (OUTPATIENT)
Age: 64
End: 2024-08-27

## 2024-09-24 ENCOUNTER — TELEPHONE (OUTPATIENT)
Age: 64
End: 2024-09-24

## 2024-09-24 NOTE — TELEPHONE ENCOUNTER
09/24/24  Screened by: Diane Alexander MA    Referring Provider Jenna Knapp PA-C    Pre- Screening:     There is no height or weight on file to calculate BMI.36.84  Has patient been referred for a routine screening Colonoscopy? yes  Is the patient between 45-75 years old? yes      Previous Colonoscopy yes   If yes:    Date: 3 yrs ago    Facility:     Reason:         Does the patient want to see a Gastroenterologist prior to their procedure OR are they having any GI symptoms? no    Has the patient been hospitalized or had abdominal surgery in the past 6 months? no    Does the patient use supplemental oxygen? no    Does the patient take Coumadin, Lovenox, Plavix, Elliquis, Xarelto, or other blood thinning medication? no    Has the patient had a stroke, cardiac event, or stent placed in the past year? no    SCHEDULING STAFF: If patient answers NO to above questions, then schedule procedure. If patient answers YES to above questions, then schedule office appointment.     If patient is between 45yrs - 49yrs, please advise patient that we will have to confirm benefits & coverage with their insurance company for a routine screening colonoscopy.

## 2024-10-07 ENCOUNTER — PREP FOR PROCEDURE (OUTPATIENT)
Age: 64
End: 2024-10-07

## 2024-10-07 ENCOUNTER — OFFICE VISIT (OUTPATIENT)
Age: 64
End: 2024-10-07
Payer: COMMERCIAL

## 2024-10-07 VITALS
OXYGEN SATURATION: 96 % | HEART RATE: 80 BPM | DIASTOLIC BLOOD PRESSURE: 70 MMHG | BODY MASS INDEX: 36.32 KG/M2 | WEIGHT: 218 LBS | HEIGHT: 65 IN | SYSTOLIC BLOOD PRESSURE: 118 MMHG

## 2024-10-07 DIAGNOSIS — Z86.0100 HISTORY OF COLON POLYPS: Primary | ICD-10-CM

## 2024-10-07 PROCEDURE — 99214 OFFICE O/P EST MOD 30 MIN: CPT | Performed by: PHYSICIAN ASSISTANT

## 2024-10-07 NOTE — PROGRESS NOTES
[facility] - Outpatient Follow-up Note  Sarah Calhoun 64 y.o. female MRN: [id number]  Encounter: [id number]          ASSESSMENT AND PLAN:      1. History of colon polyps    Patient presents to schedule a colonoscopy. Last colonoscopy was in October of 2021 and a 12mm adenoma was removed.    Will plan for colonoscopy to investigate.    Note: She has a hx of PAF on Xarelto and will hold 2 days prior to the procedure.  ______________________________________________________________________    SUBJECTIVE:  Patient is a pleasant 64 year old female with a PMH of PAF on Xarelto, HTN, GERD who presents to the office for follow-up to schedule her colonoscopy.  Patient's last colonoscopy was in October 2021 and a 12 mm adenoma was removed.  She denies any family history of colon cancer.  She did have C. difficile colitis over the summer.  She reports her bowel movements have improved now.  No rectal bleeding.  No abdominal pain.  She is on omeprazole 40 mg by mouth daily for her chronic GERD.  She is worried about long-term side effects and we will begin trying to taper down.  She was given a prescription for 20 mg of omeprazole daily at the office visit.    I discussed informed consent with the patient for the colonoscopy. The risks/benefits of the procedure were discussed with the patient. Risks included, but not limited to, infection, bleeding, perforation were discussed. Patient was agreeable.       REVIEW OF SYSTEMS IS OTHERWISE NEGATIVE.      Historical Information   Past Medical History:   Diagnosis Date    Atrial fibrillation (HCC) 2021    Hypertension     Impaired fasting glucose     Pain      Past Surgical History:   Procedure Laterality Date    HYSTERECTOMY  12/23/2006    OOPHORECTOMY Right     unilateral    OTHER SURGICAL HISTORY      Excision of lesion Genitalia Meron last assessed 11/01/16    [place] COLONOSCOPY FLX DX W/COLLJ SPEC WHEN PFRMD N/A 7/21/2016    Procedure: COLONOSCOPY;   "Surgeon: Forrest Blandon MD;  Location: [facility];  Service: Gastroenterology    TUBAL LIGATION       Social History   Social History     Substance and Sexual Activity   Alcohol Use No     Social History     Substance and Sexual Activity   Drug Use No     Social History     Tobacco Use   Smoking Status Never   Smokeless Tobacco Never   Tobacco Comments    Former     Family History   Problem Relation Age of Onset    COPD Mother     Heart failure Mother     Hyperlipidemia Mother     Hypertension Mother     COPD Father         moderate    No Known Problems Sister     No Known Problems Maternal Grandmother     Breast cancer Paternal Grandmother 70    Cancer Maternal Aunt         Urinary bladder    No Known Problems Maternal Aunt        Meds/Allergies       Current Outpatient Medications:     fluticasone (FLONASE) 50 mcg/act nasal spray    losartan (COZAAR) 100 MG tablet    metoprolol succinate (TOPROL-XL) 100 mg 24 hr tablet    omeprazole (PriLOSEC) 20 mg delayed release capsule    omeprazole (PriLOSEC) 40 MG capsule    Xarelto 20 MG tablet    Azelaic Acid 15 % cream    triamcinolone (KENALOG) 0.5 % cream    Allergies   Allergen Reactions    Erythromycin     Sulfa Antibiotics     Tetracycline            Objective     Blood pressure 118/70, pulse 80, height 5' 4.5\" (1.638 m), weight 98.9 kg (218 lb), SpO2 96%. Body mass index is 36.84 kg/m².      PHYSICAL EXAM:      General Appearance:   Alert, cooperative, no distress   HEENT:   Normocephalic, atraumatic, anicteric.     Neck:  Supple, symmetrical, trachea midline   Lungs:   Clear to auscultation bilaterally; no rales, rhonchi or wheezing; respirations unlabored    Heart::   Regular rate and rhythm; no murmur, rub, or gallop.   Abdomen:   Soft, non-tender, non-distended; normal bowel sounds; no masses, no organomegaly    Genitalia:   Deferred    Rectal:   Deferred    Extremities:  No cyanosis, clubbing   Pulses:  2+ and symmetric    Skin:  No jaundice, rashes, or " lesions    Lymph nodes:  No palpable cervical lymphadenopathy        Lab Results:   No visits with results within 1 Day(s) from this visit.   Latest known visit with results is:   Appointment on 08/14/2024   Component Date Value    Salmonella sp PCR 08/14/2024 Negative     Shigella sp/Enteroinvasi* 08/14/2024 Negative     Campylobacter sp (jejuni* 08/14/2024 Negative     Shiga toxin 1/Shiga toxi* 08/14/2024 Negative          Radiology Results:   No results found.

## 2024-10-07 NOTE — PATIENT INSTRUCTIONS
Scheduled date of colonoscopy (as of today): 10/28/24  Physician performing colonoscopy: Jamia  Location of colonoscopy: [facility]  Bowel prep reviewed with patient: Miralax  Instructions reviewed with patient by: Shawna CANNON  Clearances:

## 2024-10-07 NOTE — H&P (VIEW-ONLY)
[facility] - Outpatient Follow-up Note  Sarah Calhoun 64 y.o. female MRN: [id number]  Encounter: [id number]          ASSESSMENT AND PLAN:      1. History of colon polyps    Patient presents to schedule a colonoscopy. Last colonoscopy was in October of 2021 and a 12mm adenoma was removed.    Will plan for colonoscopy to investigate.    Note: She has a hx of PAF on Xarelto and will hold 2 days prior to the procedure.  ______________________________________________________________________    SUBJECTIVE:  Patient is a pleasant 64 year old female with a PMH of PAF on Xarelto, HTN, GERD who presents to the office for follow-up to schedule her colonoscopy.  Patient's last colonoscopy was in October 2021 and a 12 mm adenoma was removed.  She denies any family history of colon cancer.  She did have C. difficile colitis over the summer.  She reports her bowel movements have improved now.  No rectal bleeding.  No abdominal pain.  She is on omeprazole 40 mg by mouth daily for her chronic GERD.  She is worried about long-term side effects and we will begin trying to taper down.  She was given a prescription for 20 mg of omeprazole daily at the office visit.    I discussed informed consent with the patient for the colonoscopy. The risks/benefits of the procedure were discussed with the patient. Risks included, but not limited to, infection, bleeding, perforation were discussed. Patient was agreeable.       REVIEW OF SYSTEMS IS OTHERWISE NEGATIVE.      Historical Information   Past Medical History:   Diagnosis Date    Atrial fibrillation (HCC) 2021    Hypertension     Impaired fasting glucose     Pain      Past Surgical History:   Procedure Laterality Date    HYSTERECTOMY  12/23/2006    OOPHORECTOMY Right     unilateral    OTHER SURGICAL HISTORY      Excision of lesion Genitalia Meron last assessed 11/01/16    [place] COLONOSCOPY FLX DX W/COLLJ SPEC WHEN PFRMD N/A 7/21/2016    Procedure: COLONOSCOPY;   "Surgeon: Forrest Blandon MD;  Location: [facility];  Service: Gastroenterology    TUBAL LIGATION       Social History   Social History     Substance and Sexual Activity   Alcohol Use No     Social History     Substance and Sexual Activity   Drug Use No     Social History     Tobacco Use   Smoking Status Never   Smokeless Tobacco Never   Tobacco Comments    Former     Family History   Problem Relation Age of Onset    COPD Mother     Heart failure Mother     Hyperlipidemia Mother     Hypertension Mother     COPD Father         moderate    No Known Problems Sister     No Known Problems Maternal Grandmother     Breast cancer Paternal Grandmother 70    Cancer Maternal Aunt         Urinary bladder    No Known Problems Maternal Aunt        Meds/Allergies       Current Outpatient Medications:     fluticasone (FLONASE) 50 mcg/act nasal spray    losartan (COZAAR) 100 MG tablet    metoprolol succinate (TOPROL-XL) 100 mg 24 hr tablet    omeprazole (PriLOSEC) 20 mg delayed release capsule    omeprazole (PriLOSEC) 40 MG capsule    Xarelto 20 MG tablet    Azelaic Acid 15 % cream    triamcinolone (KENALOG) 0.5 % cream    Allergies   Allergen Reactions    Erythromycin     Sulfa Antibiotics     Tetracycline            Objective     Blood pressure 118/70, pulse 80, height 5' 4.5\" (1.638 m), weight 98.9 kg (218 lb), SpO2 96%. Body mass index is 36.84 kg/m².      PHYSICAL EXAM:      General Appearance:   Alert, cooperative, no distress   HEENT:   Normocephalic, atraumatic, anicteric.     Neck:  Supple, symmetrical, trachea midline   Lungs:   Clear to auscultation bilaterally; no rales, rhonchi or wheezing; respirations unlabored    Heart::   Regular rate and rhythm; no murmur, rub, or gallop.   Abdomen:   Soft, non-tender, non-distended; normal bowel sounds; no masses, no organomegaly    Genitalia:   Deferred    Rectal:   Deferred    Extremities:  No cyanosis, clubbing   Pulses:  2+ and symmetric    Skin:  No jaundice, rashes, or " lesions    Lymph nodes:  No palpable cervical lymphadenopathy        Lab Results:   No visits with results within 1 Day(s) from this visit.   Latest known visit with results is:   Appointment on 08/14/2024   Component Date Value    Salmonella sp PCR 08/14/2024 Negative     Shigella sp/Enteroinvasi* 08/14/2024 Negative     Campylobacter sp (jejuni* 08/14/2024 Negative     Shiga toxin 1/Shiga toxi* 08/14/2024 Negative          Radiology Results:   No results found.

## 2024-10-18 ENCOUNTER — OFFICE VISIT (OUTPATIENT)
Dept: FAMILY MEDICINE CLINIC | Facility: CLINIC | Age: 64
End: 2024-10-18
Payer: COMMERCIAL

## 2024-10-18 VITALS
BODY MASS INDEX: 36.82 KG/M2 | DIASTOLIC BLOOD PRESSURE: 72 MMHG | TEMPERATURE: 99.7 F | WEIGHT: 221 LBS | SYSTOLIC BLOOD PRESSURE: 124 MMHG | OXYGEN SATURATION: 97 % | HEART RATE: 89 BPM | HEIGHT: 65 IN

## 2024-10-18 DIAGNOSIS — I48.91 ATRIAL FIBRILLATION, UNSPECIFIED TYPE (HCC): ICD-10-CM

## 2024-10-18 DIAGNOSIS — M79.642 PAIN IN BOTH HANDS: Primary | ICD-10-CM

## 2024-10-18 DIAGNOSIS — R73.01 IMPAIRED FASTING GLUCOSE: ICD-10-CM

## 2024-10-18 DIAGNOSIS — M79.641 PAIN IN BOTH HANDS: Primary | ICD-10-CM

## 2024-10-18 DIAGNOSIS — Z23 ENCOUNTER FOR IMMUNIZATION: ICD-10-CM

## 2024-10-18 PROCEDURE — 90662 IIV NO PRSV INCREASED AG IM: CPT

## 2024-10-18 PROCEDURE — 99214 OFFICE O/P EST MOD 30 MIN: CPT

## 2024-10-18 PROCEDURE — 90471 IMMUNIZATION ADMIN: CPT

## 2024-10-18 RX ORDER — RIVAROXABAN 20 MG/1
20 TABLET, FILM COATED ORAL DAILY
Qty: 90 TABLET | Refills: 3 | Status: SHIPPED | OUTPATIENT
Start: 2024-10-18

## 2024-10-18 NOTE — ASSESSMENT & PLAN NOTE
-Follows with cardiology but next appt is not until January  -Needed refill of Xarelto   -PT is stable and asymptomatic at todays visit   Orders:    Xarelto 20 MG tablet; Take 1 tablet (20 mg total) by mouth daily

## 2024-10-18 NOTE — PROGRESS NOTES
Ambulatory Visit  Name: Sarah Calhoun      : 1960      MRN: [id number]  Encounter Provider: Kelsie Sanz PA-C  Encounter Date: 10/18/2024   Encounter department: [facility] [address]    Assessment & Plan  Pain in both hands  -B/L hand pain along flexor line of thumb  -X rays were placed in 2024, she is going to have these done  -Recommended thumb spica splint for night time   -Included Carpal tunnel exercises in AVS        Impaired fasting glucose  -Was supposed to get labs done over summer for impaired fasting glucose  -Has physical scheduled for  and would like to review blood work at this time     Orders:    Hemoglobin A1C With EAG; Future    Comprehensive metabolic panel; Future    BMI 36.0-36.9,adult  -Was supposed to get labs done over summer for impaired fasting glucose  -Has physical scheduled for  and would like to review blood work at this time     Orders:    TSH, 3rd generation with Free T4 reflex; Future    Lipid Panel with Direct LDL reflex    Atrial fibrillation, unspecified type (HCC)  -Follows with cardiology but next appt is not until January  -Needed refill of Xarelto   -PT is stable and asymptomatic at todays visit   Orders:    Xarelto 20 MG tablet; Take 1 tablet (20 mg total) by mouth daily    Encounter for immunization    Orders:    influenza vaccine, high-dose, PF 0.5 mL (Fluzone High Dose)       History of Present Illness       Patient is presenting to the office for evaluation of b/l thumb/wrist pain since April. She states it has been steadily worsening since then. She states there is some burning pain. The pain is intermittent and comes with movement. Occasionally she feels the pain radiating up her forearms. She notes there is some tenderness along her thumb. She denies any swelling or weakness.     She has tried carpal tunnel stretching at home with some relief. She denies any at home treatment for her pain. 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 She denies numbness and tingling in the rest of her fingers. She denies any injuries to the hand or wrist.       Review of Systems   Constitutional:  Negative for activity change, appetite change, fatigue and fever.   HENT:  Negative for congestion, ear pain, rhinorrhea and sore throat.    Eyes:  Negative for pain.   Respiratory:  Negative for cough and shortness of breath.    Cardiovascular:  Negative for chest pain and leg swelling.   Gastrointestinal:  Negative for abdominal distention, abdominal pain, constipation, diarrhea, nausea and vomiting.   Genitourinary:  Negative for dysuria, frequency and urgency.   Musculoskeletal:  Negative for gait problem.        B/l wrist and thumb pain   Skin:  Negative for rash.   Neurological:  Negative for dizziness, light-headedness and headaches.           Objective     /72   Pulse 89   Temp 99.7 °F (37.6 °C)   Ht 5' 4.5\" (1.638 m)   Wt 100 kg (221 lb)   SpO2 97%   BMI 37.35 kg/m²     Physical Exam  Vitals reviewed.   Constitutional:       General: She is not in acute distress.     Appearance: Normal appearance.   HENT:      Head: Normocephalic and atraumatic.      Right Ear: External ear normal.      Left Ear: External ear normal.      Nose: Nose normal.      Mouth/Throat:      Mouth: Mucous membranes are moist.   Eyes:      Extraocular Movements: Extraocular movements intact.      Conjunctiva/sclera: Conjunctivae normal.      Pupils: Pupils are equal, round, and reactive to light.   Cardiovascular:      Rate and Rhythm: Normal rate and regular rhythm.      Heart sounds: Normal heart sounds.   Pulmonary:      Effort: Pulmonary effort is normal.      Breath sounds: Normal breath sounds.   Abdominal:      General: Bowel sounds are normal. There is no distension.      Palpations: Abdomen is soft.      Tenderness: There is no abdominal tenderness.   Musculoskeletal:      Right hand: Tenderness present.      " Left hand: Tenderness present.        Arms:       Cervical back: Neck supple.      Right lower leg: No edema.      Left lower leg: No edema.      Comments: Right sided + Finkelstein, Negative Tinnels   Lymphadenopathy:      Cervical: No cervical adenopathy.   Skin:     General: Skin is warm.      Capillary Refill: Capillary refill takes less than 2 seconds.      Findings: No rash.   Neurological:      Mental Status: She is alert. Mental status is at baseline.           Kelsie Sanz PA-C  [facility]  10/18/2024 8:53 AM

## 2024-10-18 NOTE — ASSESSMENT & PLAN NOTE
-Was supposed to get labs done over summer for impaired fasting glucose  -Has physical scheduled for 11/20 and would like to review blood work at this time     Orders:    Hemoglobin A1C With EAG; Future    Comprehensive metabolic panel; Future

## 2024-10-18 NOTE — PATIENT INSTRUCTIONS
"Patient Education     Routine physical for adults   The Basics   Written by the doctors and editors at [facility]   What is a physical? -- A physical is a routine visit, or \"check-up,\" with your doctor. You might also hear it called a \"wellness visit\" or \"preventive visit.\"  During each visit, the doctor will:   Ask about your physical and mental health   Ask about your habits, behaviors, and lifestyle   Do an exam   Give you vaccines if needed   Talk to you about any medicines you take   Give advice about your health   Answer your questions  Getting regular check-ups is an important part of taking care of your health. It can help your doctor find and treat any problems you have. But it's also important for preventing health problems.  A routine physical is different from a \"sick visit.\" A sick visit is when you see a doctor because of a health concern or problem. Since physicals are scheduled ahead of time, you can think about what you want to ask the doctor.  How often should I get a physical? -- It depends on your age and health. In general, for people age 21 years and older:   If you are younger than 50 years, you might be able to get a physical every 3 years.   If you are 50 years or older, your doctor might recommend a physical every year.  If you have an ongoing health condition, like diabetes or high blood pressure, your doctor will probably want to see you more often.  What happens during a physical? -- In general, each visit will include:   Physical exam - The doctor or nurse will check your height, weight, heart rate, and blood pressure. They will also look at your eyes and ears. They will ask about how you are feeling and whether you have any symptoms that bother you.   Medicines - It's a good idea to bring a list of all the medicines you take to each doctor visit. Your doctor will talk to you about your medicines and answer any questions. Tell them if you are having any side effects that bother you. You " "should also tell them if you are having trouble paying for any of your medicines.   Habits and behaviors - This includes:   Your diet   Your exercise habits   Whether you smoke, drink alcohol, or use drugs   Whether you are sexually active   Whether you feel safe at home  Your doctor will talk to you about things you can do to improve your health and lower your risk of health problems. They will also offer help and support. For example, if you want to quit smoking, they can give you advice and might prescribe medicines. If you want to improve your diet or get more physical activity, they can help you with this, too.   Lab tests, if needed - The tests you get will depend on your age and situation. For example, your doctor might want to check your:   Cholesterol   Blood sugar   Iron level   Vaccines - The recommended vaccines will depend on your age, health, and what vaccines you already had. Vaccines are very important because they can prevent certain serious or deadly infections.   Discussion of screening - \"Screening\" means checking for diseases or other health problems before they cause symptoms. Your doctor can recommend screening based on your age, risk, and preferences. This might include tests to check for:   Cancer, such as breast, prostate, cervical, ovarian, colorectal, prostate, lung, or skin cancer   Sexually transmitted infections, such as chlamydia and gonorrhea   Mental health conditions like depression and anxiety  Your doctor will talk to you about the different types of screening tests. They can help you decide which screenings to have. They can also explain what the results might mean.   Answering questions - The physical is a good time to ask the doctor or nurse questions about your health. If needed, they can refer you to other doctors or specialists, too.  Adults older than 65 years often need other care, too. As you get older, your doctor will talk to you about:   How to prevent falling at " home   Hearing or vision tests   Memory testing   How to take your medicines safely   Making sure that you have the help and support you need at home  All topics are updated as new evidence becomes available and our peer review process is complete.  This topic retrieved from [employer] on: May 02, 2024.  Topic 450858 Version 1.0  Release: 32.4.3 - C32.122  © 2024 UpToDate, Inc. and/or its affiliates. All rights reserved.  Consumer Information Use and Disclaimer   Disclaimer: This generalized information is a limited summary of diagnosis, treatment, and/or medication information. It is not meant to be comprehensive and should be used as a tool to help the user understand and/or assess potential diagnostic and treatment options. It does NOT include all information about conditions, treatments, medications, side effects, or risks that may apply to a specific patient. It is not intended to be medical advice or a substitute for the medical advice, diagnosis, or treatment of a health care provider based on the health care provider's examination and assessment of a patient's specific and unique circumstances. Patients must speak with a health care provider for complete information about their health, medical questions, and treatment options, including any risks or benefits regarding use of medications. This information does not endorse any treatments or medications as safe, effective, or approved for treating a specific patient. UpToDate, Inc. and its affiliates disclaim any warranty or liability relating to this information or the use thereof.The use of this information is governed by the Terms of Use, available at https://www.woltersCitiLogicsuwer.com/en/know/clinical-effectiveness-terms. 2024© UpToDate, Inc. and its affiliates and/or licensors. All rights reserved.  Copyright   © 2024 UpToDate, Inc. and/or its affiliates. All rights reserved.

## 2024-10-22 ENCOUNTER — PREP FOR PROCEDURE (OUTPATIENT)
Age: 64
End: 2024-10-22

## 2024-10-28 ENCOUNTER — ANESTHESIA EVENT (OUTPATIENT)
Dept: GASTROENTEROLOGY | Facility: HOSPITAL | Age: 64
End: 2024-10-28
Payer: COMMERCIAL

## 2024-10-28 ENCOUNTER — HOSPITAL ENCOUNTER (OUTPATIENT)
Dept: GASTROENTEROLOGY | Facility: HOSPITAL | Age: 64
Setting detail: OUTPATIENT SURGERY
Discharge: HOME | End: 2024-10-28
Payer: COMMERCIAL

## 2024-10-28 ENCOUNTER — ANESTHESIA (OUTPATIENT)
Dept: GASTROENTEROLOGY | Facility: HOSPITAL | Age: 64
End: 2024-10-28
Payer: COMMERCIAL

## 2024-10-28 VITALS
DIASTOLIC BLOOD PRESSURE: 63 MMHG | RESPIRATION RATE: 20 BRPM | HEART RATE: 62 BPM | BODY MASS INDEX: 36.77 KG/M2 | HEIGHT: 64 IN | TEMPERATURE: 97.5 F | WEIGHT: 215.39 LBS | OXYGEN SATURATION: 100 % | SYSTOLIC BLOOD PRESSURE: 159 MMHG

## 2024-10-28 DIAGNOSIS — Z86.0100 HISTORY OF COLON POLYPS: ICD-10-CM

## 2024-10-28 PROCEDURE — G0105 COLORECTAL SCRN; HI RISK IND: HCPCS | Performed by: INTERNAL MEDICINE

## 2024-10-28 RX ORDER — SODIUM CHLORIDE, SODIUM LACTATE, POTASSIUM CHLORIDE, CALCIUM CHLORIDE 600; 310; 30; 20 MG/100ML; MG/100ML; MG/100ML; MG/100ML
INJECTION, SOLUTION INTRAVENOUS CONTINUOUS PRN
Status: DISCONTINUED | OUTPATIENT
Start: 2024-10-28 | End: 2024-10-28

## 2024-10-28 RX ORDER — PROPOFOL 10 MG/ML
INJECTION, EMULSION INTRAVENOUS AS NEEDED
Status: DISCONTINUED | OUTPATIENT
Start: 2024-10-28 | End: 2024-10-28

## 2024-10-28 RX ORDER — SODIUM CHLORIDE, SODIUM LACTATE, POTASSIUM CHLORIDE, CALCIUM CHLORIDE 600; 310; 30; 20 MG/100ML; MG/100ML; MG/100ML; MG/100ML
75 INJECTION, SOLUTION INTRAVENOUS CONTINUOUS
Status: DISCONTINUED | OUTPATIENT
Start: 2024-10-28 | End: 2024-11-01 | Stop reason: HOSPADM

## 2024-10-28 RX ORDER — LIDOCAINE HYDROCHLORIDE 20 MG/ML
INJECTION, SOLUTION EPIDURAL; INFILTRATION; INTRACAUDAL; PERINEURAL AS NEEDED
Status: DISCONTINUED | OUTPATIENT
Start: 2024-10-28 | End: 2024-10-28

## 2024-10-28 RX ADMIN — LIDOCAINE HYDROCHLORIDE 100 MG: 20 INJECTION, SOLUTION EPIDURAL; INFILTRATION; INTRACAUDAL at 10:41

## 2024-10-28 RX ADMIN — PROPOFOL 65 MG: 10 INJECTION, EMULSION INTRAVENOUS at 10:43

## 2024-10-28 RX ADMIN — PROPOFOL 35 MG: 10 INJECTION, EMULSION INTRAVENOUS at 10:47

## 2024-10-28 RX ADMIN — PROPOFOL 35 MG: 10 INJECTION, EMULSION INTRAVENOUS at 10:51

## 2024-10-28 RX ADMIN — PROPOFOL 35 MG: 10 INJECTION, EMULSION INTRAVENOUS at 10:55

## 2024-10-28 RX ADMIN — SODIUM CHLORIDE, SODIUM LACTATE, POTASSIUM CHLORIDE, AND CALCIUM CHLORIDE: .6; .31; .03; .02 INJECTION, SOLUTION INTRAVENOUS at 09:21

## 2024-10-28 RX ADMIN — SODIUM CHLORIDE, SODIUM LACTATE, POTASSIUM CHLORIDE, AND CALCIUM CHLORIDE 75 ML/HR: .6; .31; .03; .02 INJECTION, SOLUTION INTRAVENOUS at 10:06

## 2024-10-28 RX ADMIN — PROPOFOL 100 MG: 10 INJECTION, EMULSION INTRAVENOUS at 10:42

## 2024-10-28 NOTE — ANESTHESIA PREPROCEDURE EVALUATION
Procedure:  COLONOSCOPY    Relevant Problems   ANESTHESIA (within normal limits)      CARDIO   (+) Atrial fibrillation (HCC)   (+) Essential hypertension      GI/HEPATIC   (+) Acid reflux disease        Physical Exam    Airway    Mallampati score: II         Dental   Comment: Multiple fillings     Cardiovascular  Cardiovascular exam normal    Pulmonary  Pulmonary exam normal     Other Findings  post-pubertal.      Anesthesia Plan  ASA Score- 3     Anesthesia Type- IV sedation with anesthesia with ASA Monitors.         Additional Monitors:     Airway Plan:            Plan Factors-Exercise tolerance (METS): <4 METS.    Chart reviewed.  Imaging results reviewed. Existing labs reviewed. Patient summary reviewed.    Patient is not a current smoker.  Patient instructed to abstain from smoking on day of procedure. Patient did not smoke on day of surgery.    There is medical exclusion for perioperative obstructive sleep apnea risk education.        Induction- intravenous.    Postoperative Plan- Plan for postoperative opioid use.     Perioperative Resuscitation Plan - Level 1 - Full Code.       Informed Consent- Anesthetic plan and risks discussed with patient.  I personally reviewed this patient with the CRNA. Discussed and agreed on the Anesthesia Plan with the CRNA..

## 2024-10-28 NOTE — INTERVAL H&P NOTE
H&P reviewed. After examining the patient I find no changes in the patients condition since the H&P had been written.    Vitals:    10/28/24 0933   BP: 117/56   Pulse: 79   Resp: (!) 23   Temp: (!) 97.1 °F (36.2 °C)   SpO2: 96%

## 2024-10-28 NOTE — ANESTHESIA POSTPROCEDURE EVALUATION
Post-Op Assessment Note    CV Status:  Stable  Pain Score: 0    Pain management: adequate       Mental Status:  Sleepy and arousable   Hydration Status:  Euvolemic   PONV Controlled:  Controlled   Airway Patency:  Patent     Post Op Vitals Reviewed: Yes    No anethesia notable event occurred.    Staff: CRNA           Last Filed PACU Vitals:  Vitals Value Taken Time   Temp     Pulse     BP     Resp     SpO2         Modified Edwardo:  Activity: 2 (10/28/2024  9:34 AM)  Respiration: 2 (10/28/2024  9:34 AM)  Consciousness: 2 (10/28/2024  9:34 AM)  Oxygen Saturation: 2 (10/28/2024  9:34 AM)

## 2024-11-06 ENCOUNTER — OFFICE VISIT (OUTPATIENT)
Dept: FAMILY MEDICINE CLINIC | Facility: CLINIC | Age: 64
End: 2024-11-06
Payer: COMMERCIAL

## 2024-11-06 VITALS
SYSTOLIC BLOOD PRESSURE: 112 MMHG | BODY MASS INDEX: 37.73 KG/M2 | TEMPERATURE: 98.2 F | HEART RATE: 77 BPM | WEIGHT: 221 LBS | OXYGEN SATURATION: 96 % | DIASTOLIC BLOOD PRESSURE: 72 MMHG | HEIGHT: 64 IN

## 2024-11-06 DIAGNOSIS — R82.90 FOUL SMELLING URINE: Primary | ICD-10-CM

## 2024-11-06 DIAGNOSIS — N30.00 ACUTE CYSTITIS WITHOUT HEMATURIA: ICD-10-CM

## 2024-11-06 LAB
BACTERIA UR QL AUTO: ABNORMAL /HPF
BILIRUB UR QL STRIP: NEGATIVE
CLARITY UR: ABNORMAL
COLOR UR: ABNORMAL
GLUCOSE UR STRIP-MCNC: NEGATIVE MG/DL
HGB UR QL STRIP.AUTO: ABNORMAL
KETONES UR STRIP-MCNC: NEGATIVE MG/DL
LEUKOCYTE ESTERASE UR QL STRIP: ABNORMAL
NITRITE UR QL STRIP: POSITIVE
NON-SQ EPI CELLS URNS QL MICRO: ABNORMAL /HPF
PH UR STRIP.AUTO: 5.5 [PH]
PROT UR STRIP-MCNC: ABNORMAL MG/DL
RBC #/AREA URNS AUTO: ABNORMAL /HPF
SL AMB  POCT GLUCOSE, UA: ABNORMAL
SL AMB LEUKOCYTE ESTERASE,UA: ABNORMAL
SL AMB POCT BILIRUBIN,UA: ABNORMAL
SL AMB POCT BLOOD,UA: ABNORMAL
SL AMB POCT CLARITY,UA: ABNORMAL
SL AMB POCT COLOR,UA: ABNORMAL
SL AMB POCT KETONES,UA: 5
SL AMB POCT NITRITE,UA: 8
SL AMB POCT PH,UA: 5
SL AMB POCT SPECIFIC GRAVITY,UA: 1.01
SL AMB POCT URINE PROTEIN: 30
SL AMB POCT UROBILINOGEN: 0.2
SP GR UR STRIP.AUTO: 1.01 (ref 1–1.03)
UROBILINOGEN UR STRIP-ACNC: <2 MG/DL
WBC #/AREA URNS AUTO: ABNORMAL /HPF
WBC CLUMPS # UR AUTO: PRESENT /UL

## 2024-11-06 PROCEDURE — 87086 URINE CULTURE/COLONY COUNT: CPT | Performed by: PHYSICIAN ASSISTANT

## 2024-11-06 PROCEDURE — 99213 OFFICE O/P EST LOW 20 MIN: CPT | Performed by: PHYSICIAN ASSISTANT

## 2024-11-06 PROCEDURE — 87186 SC STD MICRODIL/AGAR DIL: CPT | Performed by: PHYSICIAN ASSISTANT

## 2024-11-06 PROCEDURE — 81001 URINALYSIS AUTO W/SCOPE: CPT | Performed by: PHYSICIAN ASSISTANT

## 2024-11-06 PROCEDURE — 87077 CULTURE AEROBIC IDENTIFY: CPT | Performed by: PHYSICIAN ASSISTANT

## 2024-11-06 PROCEDURE — 81002 URINALYSIS NONAUTO W/O SCOPE: CPT | Performed by: PHYSICIAN ASSISTANT

## 2024-11-06 RX ORDER — NITROFURANTOIN 25; 75 MG/1; MG/1
100 CAPSULE ORAL 2 TIMES DAILY
Qty: 10 CAPSULE | Refills: 0 | Status: SHIPPED | OUTPATIENT
Start: 2024-11-06 | End: 2024-11-11

## 2024-11-06 NOTE — PROGRESS NOTES
Assessment/Plan:          Diagnoses and all orders for this visit:    Foul smelling urine  -     POCT urine dip  -     UA (URINE) with reflex to Scope; Future  -     Urine culture; Future  -     UA (URINE) with reflex to Scope  -     Urine culture    Acute cystitis without hematuria  -     nitrofurantoin (MACROBID) 100 mg capsule; Take 1 capsule (100 mg total) by mouth 2 (two) times a day for 5 days            Subjective:      Patient ID: Sarah Calhoun is a 64 y.o. female.    Urinary Tract Infection   This is a new problem. The current episode started yesterday. The problem occurs every urination. The problem has been unchanged. The quality of the pain is described as aching. The pain is mild. There has been no fever. She is Sexually active. There is No history of pyelonephritis. Associated symptoms include frequency. Pertinent negatives include no chills, discharge, flank pain, hematuria, hesitancy, nausea, sweats, urgency or vomiting. Associated symptoms comments: Foul smelling urine. She has tried nothing for the symptoms.       The following portions of the patient's history were reviewed and updated as appropriate:   She has a past medical history of Atrial fibrillation (HCC) (2021), Hypertension, Impaired fasting glucose, and Pain.,  does not have any pertinent problems on file.,   has a past surgical history that includes Tubal ligation; pr colonoscopy flx dx w/collj spec when pfrmd (N/A, 7/21/2016); Other surgical history; Oophorectomy (Right); and Hysterectomy (12/23/2006).,  family history includes Breast cancer (age of onset: 70) in her paternal grandmother; COPD in her father and mother; Cancer in her maternal aunt; Heart failure in her mother; Hyperlipidemia in her mother; Hypertension in her mother; No Known Problems in her maternal aunt, maternal grandmother, and sister.,   reports that she has never smoked. She has never used smokeless tobacco. She reports that she does not drink alcohol and does  "not use drugs.,  is allergic to erythromycin, sulfa antibiotics, and tetracycline..  Current Outpatient Medications   Medication Sig Dispense Refill    fluticasone (FLONASE) 50 mcg/act nasal spray SPRAY 2 SPRAYS INTO EACH NOSTRIL EVERY DAY 48 mL 0    losartan (COZAAR) 100 MG tablet TAKE 1 TABLET BY MOUTH EVERY DAY 90 tablet 1    metoprolol succinate (TOPROL-XL) 100 mg 24 hr tablet TAKE 1 TABLET BY MOUTH EVERY DAY 90 tablet 1    nitrofurantoin (MACROBID) 100 mg capsule Take 1 capsule (100 mg total) by mouth 2 (two) times a day for 5 days 10 capsule 0    omeprazole (PriLOSEC) 20 mg delayed release capsule Take 1 capsule (20 mg total) by mouth daily 90 capsule 1    omeprazole (PriLOSEC) 40 MG capsule TAKE 1 CAPSULE BY MOUTH EVERY DAY 90 capsule 1    Xarelto 20 MG tablet Take 1 tablet (20 mg total) by mouth daily 90 tablet 3     No current facility-administered medications for this visit.       Review of Systems   Constitutional:  Negative for chills.   Gastrointestinal:  Negative for nausea and vomiting.   Genitourinary:  Positive for dysuria and frequency. Negative for flank pain, hematuria, hesitancy and urgency.        Foul smelling urine         Objective:  Vitals:    11/06/24 1001   BP: 112/72   Pulse: 77   Temp: 98.2 °F (36.8 °C)   SpO2: 96%   Weight: 100 kg (221 lb)   Height: 5' 4\" (1.626 m)     Body mass index is 37.93 kg/m².     Physical Exam  Constitutional:       Appearance: Normal appearance.   Cardiovascular:      Rate and Rhythm: Normal rate.   Pulmonary:      Effort: Pulmonary effort is normal.   Abdominal:      General: Bowel sounds are normal.      Palpations: There is no mass.      Tenderness: There is abdominal tenderness in the suprapubic area. There is no right CVA tenderness, left CVA tenderness, guarding or rebound.   Neurological:      Mental Status: She is alert.           "

## 2024-11-07 ENCOUNTER — TELEPHONE (OUTPATIENT)
Age: 64
End: 2024-11-07

## 2024-11-07 NOTE — TELEPHONE ENCOUNTER
Patient called about results for her urine labs. Got the results on [employer] but doesn't understand them. Wants to know if someone can call her tomorrow to go over the results? Please advise. Thank you!    Sarah: [phone]

## 2024-11-08 LAB — BACTERIA UR CULT: ABNORMAL

## 2024-11-08 NOTE — TELEPHONE ENCOUNTER
Pt has a UTI, I am just waiting on antibiotic sensitivities to make sure antibiotic she is on will fully treat.

## 2024-11-18 ENCOUNTER — LAB (OUTPATIENT)
Age: 64
End: 2024-11-18
Payer: COMMERCIAL

## 2024-11-18 DIAGNOSIS — R73.01 IMPAIRED FASTING GLUCOSE: ICD-10-CM

## 2024-11-18 LAB
ALBUMIN SERPL BCG-MCNC: 4.1 G/DL (ref 3.5–5)
ALP SERPL-CCNC: 117 U/L (ref 34–104)
ALT SERPL W P-5'-P-CCNC: 37 U/L (ref 7–52)
ANION GAP SERPL CALCULATED.3IONS-SCNC: 10 MMOL/L (ref 4–13)
AST SERPL W P-5'-P-CCNC: 22 U/L (ref 13–39)
BILIRUB SERPL-MCNC: 0.41 MG/DL (ref 0.2–1)
BUN SERPL-MCNC: 22 MG/DL (ref 5–25)
CALCIUM SERPL-MCNC: 9.2 MG/DL (ref 8.4–10.2)
CHLORIDE SERPL-SCNC: 103 MMOL/L (ref 96–108)
CHOLEST SERPL-MCNC: 175 MG/DL (ref ?–200)
CO2 SERPL-SCNC: 26 MMOL/L (ref 21–32)
CREAT SERPL-MCNC: 1.06 MG/DL (ref 0.6–1.3)
EST. AVERAGE GLUCOSE BLD GHB EST-MCNC: 137 MG/DL
GFR SERPL CREATININE-BSD FRML MDRD: 55 ML/MIN/1.73SQ M
GLUCOSE P FAST SERPL-MCNC: 99 MG/DL (ref 65–99)
HBA1C MFR BLD: 6.4 %
HDLC SERPL-MCNC: 46 MG/DL
LDLC SERPL CALC-MCNC: 107 MG/DL (ref 0–100)
POTASSIUM SERPL-SCNC: 4.4 MMOL/L (ref 3.5–5.3)
PROT SERPL-MCNC: 7 G/DL (ref 6.4–8.4)
SODIUM SERPL-SCNC: 139 MMOL/L (ref 135–147)
TRIGL SERPL-MCNC: 109 MG/DL (ref ?–150)
TSH SERPL DL<=0.05 MIU/L-ACNC: 2.08 UIU/ML (ref 0.45–4.5)

## 2024-11-18 PROCEDURE — 84443 ASSAY THYROID STIM HORMONE: CPT

## 2024-11-18 PROCEDURE — 80053 COMPREHEN METABOLIC PANEL: CPT

## 2024-11-18 PROCEDURE — 36415 COLL VENOUS BLD VENIPUNCTURE: CPT

## 2024-11-18 PROCEDURE — 83036 HEMOGLOBIN GLYCOSYLATED A1C: CPT

## 2024-11-20 ENCOUNTER — OFFICE VISIT (OUTPATIENT)
Dept: FAMILY MEDICINE CLINIC | Facility: CLINIC | Age: 64
End: 2024-11-20
Payer: COMMERCIAL

## 2024-11-20 VITALS
BODY MASS INDEX: 37.9 KG/M2 | SYSTOLIC BLOOD PRESSURE: 128 MMHG | HEART RATE: 86 BPM | HEIGHT: 64 IN | RESPIRATION RATE: 18 BRPM | OXYGEN SATURATION: 96 % | DIASTOLIC BLOOD PRESSURE: 84 MMHG | WEIGHT: 222 LBS

## 2024-11-20 DIAGNOSIS — I48.91 ATRIAL FIBRILLATION, UNSPECIFIED TYPE (HCC): ICD-10-CM

## 2024-11-20 DIAGNOSIS — Z00.00 ANNUAL PHYSICAL EXAM: Primary | ICD-10-CM

## 2024-11-20 DIAGNOSIS — Z80.9 FAMILY HISTORY OF CANCER: ICD-10-CM

## 2024-11-20 DIAGNOSIS — I10 ESSENTIAL HYPERTENSION: ICD-10-CM

## 2024-11-20 DIAGNOSIS — R73.03 PREDIABETES: ICD-10-CM

## 2024-11-20 DIAGNOSIS — L71.9 ROSACEA: ICD-10-CM

## 2024-11-20 PROCEDURE — 99397 PER PM REEVAL EST PAT 65+ YR: CPT | Performed by: FAMILY MEDICINE

## 2024-11-20 RX ORDER — METRONIDAZOLE 7.5 MG/G
LOTION TOPICAL 2 TIMES DAILY
Qty: 59 ML | Refills: 2 | Status: SHIPPED | OUTPATIENT
Start: 2024-11-20

## 2024-11-20 NOTE — PROGRESS NOTES
"Adult Annual Physical  Name: Sarah Calhoun      : 1960      MRN: [id number]  Encounter Provider: Aviva Monroe DO  Encounter Date: 2024   Encounter department: [facility] [address]    Assessment & Plan  Annual physical exam         BMI 38.0-38.9,adult         Rosacea    Orders:    METRONIDAZOLE, TOPICAL, 0.75 % LOTN; Apply topically 2 (two) times a day    Essential hypertension  BP well controlled.        Atrial fibrillation, unspecified type (HCC)  Stable, well controlled.        Prediabetes  Hgb A1c of 6.4 on 24, discussed lifestyle changes, declines medication at this time. Will follow up for Hgb A1c in 3 months.       Family history of cancer    Orders:    Ambulatory Referral to Oncology Genetics; Future      Immunizations and preventive care screenings were discussed with patient today. Appropriate education was printed on patient's after visit summary.    Counseling:  Alcohol/drug use: discussed moderation in alcohol intake, the recommendations for healthy alcohol use, and avoidance of illicit drug use.  Dental Health: discussed importance of regular tooth brushing, flossing, and dental visits.  Sexual health: discussed sexually transmitted diseases, partner selection, use of condoms, avoidance of unintended pregnancy, and contraceptive alternatives.  Exercise: the importance of regular exercise/physical activity was discussed. Recommend exercise 3-5 times per week for at least 30 minutes.        History of Present Illness     Adult Annual Physical:  Patient presents for annual physical. Notes that she has had rosacea for a long time, was using metro lotion but ran out. States that this worked well for her.     Maternal aunt with bladder cancer, maternal uncle with laryngeal cancer, mother with laryngeal cancer, maternal uncle with \"blood cancer (not leukemia)\".    Had hysterectomy due to precancerous cells in her uterus. .     Diet and Physical " "Activity:  - Diet/Nutrition: poor diet. junk and on the go.  - Exercise: no formal exercise. 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    Depression Screening:  - PHQ-2 Score: 0    General Health:  - Sleep: sleeps poorly. recently bought OTC sleep aid to try  - Hearing: decreased hearing right ear and normal hearing left ear. right ear with trouble since childhood, very manageable.  - Vision: goes for regular eye exams, wears glasses and most recent eye exam < 1 year ago.  - Dental: regular dental visits, brushes teeth three times daily and does not floss.    /GYN Health:  - Follows with GYN: yes.   - Menopause: postmenopausal.   - History of STDs: no    Review of Systems      Objective   /84 (BP Location: Left arm, Patient Position: Sitting, Cuff Size: Large)   Pulse 86   Resp 18   Ht 5' 4\" (1.626 m)   Wt 101 kg (222 lb)   SpO2 96%   BMI 38.11 kg/m²     Physical Exam  Vitals reviewed.   Constitutional:       General: She is not in acute distress.     Appearance: Normal appearance. She is obese.   HENT:      Head: Normocephalic and atraumatic.      Right Ear: External ear normal.      Left Ear: External ear normal.      Nose: Nose normal.      Mouth/Throat:      Mouth: Mucous membranes are moist.   Eyes:      Extraocular Movements: Extraocular movements intact.      Conjunctiva/sclera: Conjunctivae normal.      Pupils: Pupils are equal, round, and reactive to light.   Cardiovascular:      Rate and Rhythm: Normal rate and regular rhythm.      Heart sounds: Normal heart sounds.   Pulmonary:      Effort: Pulmonary effort is normal.      Breath sounds: Normal breath sounds.   Abdominal:      General: Bowel sounds are normal. There is no distension.      Palpations: Abdomen is soft.      Tenderness: There is no abdominal tenderness.   Musculoskeletal:      Right lower leg: No edema.      Left lower leg: No edema.   Skin:     General: Skin is warm.      Capillary Refill: Capillary refill takes less than 2 " seconds.      Findings: No rash.   Neurological:      Mental Status: She is alert. Mental status is at baseline.           DO Kobe [facility]  11/20/2024 7:31 AM

## 2024-11-20 NOTE — PATIENT INSTRUCTIONS
"Patient Education     Routine physical for adults   The Basics   Written by the doctors and editors at [facility]   What is a physical? -- A physical is a routine visit, or \"check-up,\" with your doctor. You might also hear it called a \"wellness visit\" or \"preventive visit.\"  During each visit, the doctor will:   Ask about your physical and mental health   Ask about your habits, behaviors, and lifestyle   Do an exam   Give you vaccines if needed   Talk to you about any medicines you take   Give advice about your health   Answer your questions  Getting regular check-ups is an important part of taking care of your health. It can help your doctor find and treat any problems you have. But it's also important for preventing health problems.  A routine physical is different from a \"sick visit.\" A sick visit is when you see a doctor because of a health concern or problem. Since physicals are scheduled ahead of time, you can think about what you want to ask the doctor.  How often should I get a physical? -- It depends on your age and health. In general, for people age 21 years and older:   If you are younger than 50 years, you might be able to get a physical every 3 years.   If you are 50 years or older, your doctor might recommend a physical every year.  If you have an ongoing health condition, like diabetes or high blood pressure, your doctor will probably want to see you more often.  What happens during a physical? -- In general, each visit will include:   Physical exam - The doctor or nurse will check your height, weight, heart rate, and blood pressure. They will also look at your eyes and ears. They will ask about how you are feeling and whether you have any symptoms that bother you.   Medicines - It's a good idea to bring a list of all the medicines you take to each doctor visit. Your doctor will talk to you about your medicines and answer any questions. Tell them if you are having any side effects that bother you. You " "should also tell them if you are having trouble paying for any of your medicines.   Habits and behaviors - This includes:   Your diet   Your exercise habits   Whether you smoke, drink alcohol, or use drugs   Whether you are sexually active   Whether you feel safe at home  Your doctor will talk to you about things you can do to improve your health and lower your risk of health problems. They will also offer help and support. For example, if you want to quit smoking, they can give you advice and might prescribe medicines. If you want to improve your diet or get more physical activity, they can help you with this, too.   Lab tests, if needed - The tests you get will depend on your age and situation. For example, your doctor might want to check your:   Cholesterol   Blood sugar   Iron level   Vaccines - The recommended vaccines will depend on your age, health, and what vaccines you already had. Vaccines are very important because they can prevent certain serious or deadly infections.   Discussion of screening - \"Screening\" means checking for diseases or other health problems before they cause symptoms. Your doctor can recommend screening based on your age, risk, and preferences. This might include tests to check for:   Cancer, such as breast, prostate, cervical, ovarian, colorectal, prostate, lung, or skin cancer   Sexually transmitted infections, such as chlamydia and gonorrhea   Mental health conditions like depression and anxiety  Your doctor will talk to you about the different types of screening tests. They can help you decide which screenings to have. They can also explain what the results might mean.   Answering questions - The physical is a good time to ask the doctor or nurse questions about your health. If needed, they can refer you to other doctors or specialists, too.  Adults older than 65 years often need other care, too. As you get older, your doctor will talk to you about:   How to prevent falling at " home   Hearing or vision tests   Memory testing   How to take your medicines safely   Making sure that you have the help and support you need at home  All topics are updated as new evidence becomes available and our peer review process is complete.  This topic retrieved from [employer] on: May 02, 2024.  Topic 392918 Version 1.0  Release: 32.4.3 - C32.122  © 2024 UpToDate, Inc. and/or its affiliates. All rights reserved.  Consumer Information Use and Disclaimer   Disclaimer: This generalized information is a limited summary of diagnosis, treatment, and/or medication information. It is not meant to be comprehensive and should be used as a tool to help the user understand and/or assess potential diagnostic and treatment options. It does NOT include all information about conditions, treatments, medications, side effects, or risks that may apply to a specific patient. It is not intended to be medical advice or a substitute for the medical advice, diagnosis, or treatment of a health care provider based on the health care provider's examination and assessment of a patient's specific and unique circumstances. Patients must speak with a health care provider for complete information about their health, medical questions, and treatment options, including any risks or benefits regarding use of medications. This information does not endorse any treatments or medications as safe, effective, or approved for treating a specific patient. UpToDate, Inc. and its affiliates disclaim any warranty or liability relating to this information or the use thereof.The use of this information is governed by the Terms of Use, available at https://www.woltersTapZillauwer.com/en/know/clinical-effectiveness-terms. 2024© UpToDate, Inc. and its affiliates and/or licensors. All rights reserved.  Copyright   © 2024 UpToDate, Inc. and/or its affiliates. All rights reserved.

## 2025-02-04 DIAGNOSIS — I10 ESSENTIAL HYPERTENSION: ICD-10-CM

## 2025-02-04 RX ORDER — LOSARTAN POTASSIUM 100 MG/1
100 TABLET ORAL DAILY
Qty: 90 TABLET | Refills: 1 | Status: SHIPPED | OUTPATIENT
Start: 2025-02-04

## 2025-02-04 NOTE — TELEPHONE ENCOUNTER
Reason for call:   [x] Refill   [] Prior Auth  [] Other:     Office:   [x] PCP/Provider - Tory  [] Specialty/Provider -     Medication:   Losartan 100 mg, 1 qd, 90    Pharmacy:   [facility]    Does the patient have enough for 3 days?   [x] Yes   [] No - Send as HP to POD

## 2025-03-10 ENCOUNTER — DOCUMENTATION (OUTPATIENT)
Dept: GENETICS | Facility: CLINIC | Age: 65
End: 2025-03-10

## 2025-03-12 ENCOUNTER — OFFICE VISIT (OUTPATIENT)
Dept: GENETICS | Facility: CLINIC | Age: 65
End: 2025-03-12

## 2025-03-12 DIAGNOSIS — Z80.9 FAMILY HISTORY OF CANCER: ICD-10-CM

## 2025-03-12 NOTE — PROGRESS NOTES
Pre-Test Genetic Counseling Consult Note    Patient Name: Sarah Calhoun   /Age: 1960/64 y.o.  Referring Provider:  Aviva Monroe DO    Date of Service: 3/12/2025  Genetic Counselor: Ashley Miller MS, [facility] [place]  Interpretation Services: None  Location: Telephone consult   Length of Visit: 30 Minutes    Sarah was referred to the [facility] Cancer Risk and Genetic Assessment Program due to her family history of breast and prostate cancer . she presents today to discuss the possibility of a hereditary cancer syndrome, options for genetic testing, and implications for her and her family.     Cancer History and Treatment:   Personal History:   Oncology History    No history exists.       Screening Hx:   Breast:  Breast Imaging: Mammogram- not up to date ()   Breast Biopsy: none   Breast Density: Almost entirely fatty    Colon:  Colonoscopy (): 0 polyps reported  F/u recommended in 5 years     Colonoscopy (): 1 tubular adenoma     Gynecologic:  S/p hysterectomy + unilateral oophorectomy at 43 y/o secondary for bleeding     Skin:  No current screening  Has f/u in past few years    Reproductive History  Age at menarche: 11  Age at first live birth:  35  Menopause: Post Menopausal (unknown)  Hormone replacement: none    Medical and Surgical History  Pertinent surgical history:   Past Surgical History:   Procedure Laterality Date    HYSTERECTOMY  2006    OOPHORECTOMY Right     unilateral    OTHER SURGICAL HISTORY      Excision of lesion Genitalia Meron last assessed 16    IA COLONOSCOPY FLX DX W/COLLJ SPEC WHEN PFRMD N/A 2016    Procedure: COLONOSCOPY;  Surgeon: Forrest Blandon MD;  Location: [facility];  Service: Gastroenterology    TUBAL LIGATION        Pertinent medical history:  Past Medical History:   Diagnosis Date    Atrial fibrillation (HCC)     Hypertension     Impaired fasting glucose     Pain          Other History:  Height:   Ht Readings from Last 1 Encounters:  "  11/20/24 5' 4\" (1.626 m)     Weight:   Wt Readings from Last 1 Encounters:   11/20/24 101 kg (222 lb)       Relevant Family History   Patient reports non-Ashkenazi Evangelical ancestry.     Maternal Family History:  Mother: cancer of voice box diagnosed at 89, non-smoker (second-hand exposure) (d.90)  Uncle: throat cancer, smoker (d.late 70s)  Uncle: unknown cancer diagnosed in late 80s (d.late 80s)  Aunt: bladder cancer diagnosed in 60s, smoker (d.60s)     Paternal Family History:  Father: prostate cancer diagnosed in 70s (currently 91 y/o)  Grandmother: breast cancer diagnosed in late 60s, no info (d.late 60s)     Please refer to the scanned pedigree in the Media Tab for a complete family history     *All history is reported as provided by the patient; records are not available for review, except where indicated.     Assessment:  We discussed sporadic, familial and hereditary cancer.  We reviewed that only 5-10% of cancers are considered hereditary. Cancers such as lung cancer, cervical cancer, testicular cancer, and liver cancer very rarely have a hereditary etiology, and we cannot test for a genetic predisposition for these cancers at this time.  We also discussed the many factors that influence our risk for cancer such as age, environmental exposures, lifestyle choices and family history.      We reviewed the indications suggestive of a hereditary predisposition to cancer.  We discussed the low likelihood that a hereditary cancer syndrome is present based on the reported family history. However, her father's generation is male-dominated, which could mask the presence of a breast cancer susceptibility pathogenic variant.     Of note, while testing an affected family member is most informative, it is also appropriate to test unaffected family members who meet testing criteria (NCCN Guidelines for Genetic/Familial High-Risk Assessment: Breast, Ovarian, and Pancreatic).      Although Sarah does not meet NCCN testing " criteria for hereditary cancer, one study published in 2024 found that ~40% of individuals with a pathogenic variant in a cancer susceptibility gene identified on multigene panel did not meet NCCN hereditary cancer testing criteria [ANIBAL Burns., ARPIT Worrell, LEX Diallo, ANIBAL Mcnally., LEX Joe, BERT Liao, ... & JOHN Walker. (2024). A targeted panel of actionable high-risk hereditary cancer predisposition genes to identify patients with pathogenic/likely pathogenic variants (PVs) irrespective of meeting established NCCN testing criteria].  Thus, testing should be considered to rule out pathogenic variant in cancer susceptibility genes that have clear management guidelines.      The risks, benefits, and limitations of genetic testing were reviewed with the patient. If positive for a mutation, options for managing cancer risk including increased surveillance, chemoprevention, and in some cases prophylactic surgery were discussed.     Sarah was informed that if a hereditary cancer syndrome was identified in her, first degree relatives (parents, siblings, and children) have a chance of also inheriting the condition. Genetic testing would allow for predictive genetic testing in other relatives, who may also be at risk depending on their degree of relation.     We reviewed that [facility] [employer] is a population based genetic screening initiative that reports out pathogenic variants in genes that cause HBOC syndrome, Lewis syndrome, and Familial Hypercholesterolemia only.  Enrollment is free of charge and participation does not involve insurance. We reviewed that [facility] molecular screen is not diagnostic testing and does not include analysis of many other cancer susceptibility genes. Negative [facility] does not guarantee a larger diagnostic panel would return negative as well.      Billing:  Most individuals pay <$100 for hereditary cancer genetic testing. If insurance covers the cost of the testing, individuals may still  pay out of pocket secondary to co-pays, co-insurance, or deductibles. If the cost of the testing exceeds $100, the lab will reach out to the patient via phone or e-mail. The patient will then have the option to proceed with the testing, cancel the testing, or elect the self-pay option of $250. Sarah verbalized understanding.     Plan: Patient decided not to proceed with testing at this time. She is reportedly starting a new job in April 2025 and suspects to have better benefits soon. She will contact the genetics program if she decides to proceed with testing in the future. We will place the order at that time.

## 2025-03-17 ENCOUNTER — OFFICE VISIT (OUTPATIENT)
Dept: CARDIOLOGY CLINIC | Facility: CLINIC | Age: 65
End: 2025-03-17
Payer: COMMERCIAL

## 2025-03-17 VITALS
OXYGEN SATURATION: 96 % | HEART RATE: 78 BPM | TEMPERATURE: 98.2 F | WEIGHT: 222.4 LBS | BODY MASS INDEX: 37.97 KG/M2 | HEIGHT: 64 IN | SYSTOLIC BLOOD PRESSURE: 124 MMHG | DIASTOLIC BLOOD PRESSURE: 86 MMHG

## 2025-03-17 DIAGNOSIS — G47.33 OSA (OBSTRUCTIVE SLEEP APNEA): ICD-10-CM

## 2025-03-17 DIAGNOSIS — R00.2 PALPITATIONS: ICD-10-CM

## 2025-03-17 DIAGNOSIS — I48.0 PAROXYSMAL ATRIAL FIBRILLATION (HCC): Primary | ICD-10-CM

## 2025-03-17 DIAGNOSIS — E66.09 CLASS 2 OBESITY DUE TO EXCESS CALORIES WITHOUT SERIOUS COMORBIDITY WITH BODY MASS INDEX (BMI) OF 38.0 TO 38.9 IN ADULT: ICD-10-CM

## 2025-03-17 DIAGNOSIS — I10 ESSENTIAL HYPERTENSION: ICD-10-CM

## 2025-03-17 DIAGNOSIS — E66.812 CLASS 2 OBESITY DUE TO EXCESS CALORIES WITHOUT SERIOUS COMORBIDITY WITH BODY MASS INDEX (BMI) OF 38.0 TO 38.9 IN ADULT: ICD-10-CM

## 2025-03-17 PROCEDURE — 99204 OFFICE O/P NEW MOD 45 MIN: CPT | Performed by: INTERNAL MEDICINE

## 2025-03-17 PROCEDURE — 93000 ELECTROCARDIOGRAM COMPLETE: CPT | Performed by: INTERNAL MEDICINE

## 2025-03-17 NOTE — PROGRESS NOTES
[facility]  [address] [facility] [address]  [phone]                                              Cardiology Office Consult  Sarah Calhoun, 64 y.o. female  YOB: 1960  MRN: [id number] Encounter: [id number]      PCP - Jenna Knapp PA-C  Referring Provider - Self, Referral    No chief complaint on file.      Assessment  Paroxysmal Atrial Fibrillation   Hypertension  Pre-diabetes  Obesity, Body mass index is 38.17 kg/m².           Plan  Assessment & Plan  Paroxysmal atrial fibrillation (HCC)  Overview  Self-reported known history of atrial fibrillation for about 3 years, but unfortunately no records available for the same -will obtain records from Dr. Garg's office   Reports that this was previously confirmed on cardiac monitor   She has been on metoprolol, Xarelto  No known prior procedures for A-fib, and all her episodes have been self resolving within a couple of hours  But over the last month she is having increased A-fib episodes on her Apple Watch, especially overnight  risk factors - increased stress at work, caffeine use  Impression  Paroxysmal atrial fibrillation, mild symptoms and self resolving episodes so far  Plan  Check 2-week ZIO AT monitor to evaluate A-fib and consider medical therapy for this  Check echocardiogram to evaluate LV function and valvular dysfunction  Counseled regarding need for risk factor optimization  Check sleep study  Limit caffeine intake  Weight loss recommended    Palpitations  See above  Essential hypertension  Well-controlled, 124/86  Continue losartan 100 mg daily, metoprolol succinate 100 mg daily  Weight loss recommended  ALISON (obstructive sleep apnea)  She was multiple visits to suggest significant sleep apnea including snoring, lack of restful sleep, daytime sleepiness, and has clinical issues related to abnormal heart rhythm occurring overnight  She will benefit from sleep apnea testing for  this --> recommend sleep study  Class 2 obesity due to excess calories without serious comorbidity with body mass index (BMI) of 38.0 to 38.9 in adult  Body mass index is 38.17 kg/m².  Counseled regarding need for dietary modifications, need to increase exercise levels and pursue weight loss  Emphasized need to stop using sugary beverages (drinks daily coke can) and donuts, etc  Weight loss to goal weight of around 180 pounds or below is recommended      Results for orders placed or performed in visit on 03/17/25   POCT ECG    Impression    Normal sinus rhythm with occasional PVCs  Possible left atrial enlargement  Incomplete right bundle branch block       Orders Placed This Encounter   Procedures   • Ambulatory Referral to Sleep Medicine   • Zio AT   • Zio AT   • POCT ECG   • Echo complete w/ contrast if indicated     Return in about 6 weeks (around 4/28/2025), or if symptoms worsen or fail to improve.      History of Present Illness   64 y.o. female comes in as a new patient for establishment of care regarding paroxysmal atrial fibrillation.    She reports having a known history of atrial fibrillation for about 3 years, for which she used to see  previously.     Known atrial fibrillation for 3 years.   Initially had palpitations for 2 hours, and then was found to have afib on holter rmonitor.    Family history  Father - ALISON, no CAD/Afib  Mother - ?CAD (at 80), ICD implant  Siblings:   1 older sister - HTN, No known CAD    Historical Information   Past Medical History:   Diagnosis Date   • Atrial fibrillation (HCC) 2021   • Hypertension    • Impaired fasting glucose    • Pain      Past Surgical History:   Procedure Laterality Date   • HYSTERECTOMY  12/23/2006   • OOPHORECTOMY Right     unilateral   • OTHER SURGICAL HISTORY      Excision of lesion Genitalia Meron last assessed 11/01/16   • WI COLONOSCOPY FLX DX W/COLLJ SPEC WHEN PFRMD N/A 7/21/2016    Procedure: COLONOSCOPY;  Surgeon: Forrest Blandon,  MD;  Location: [facility];  Service: Gastroenterology   • TUBAL LIGATION       Family History   Problem Relation Age of Onset   • COPD Mother    • Heart failure Mother    • Hyperlipidemia Mother    • Hypertension Mother    • COPD Father         moderate   • No Known Problems Sister    • No Known Problems Maternal Grandmother    • Breast cancer Paternal Grandmother 70   • Cancer Maternal Aunt         Urinary bladder   • No Known Problems Maternal Aunt      Current Outpatient Medications   Medication Instructions   • fluticasone (FLONASE) 50 mcg/act nasal spray SPRAY 2 SPRAYS INTO EACH NOSTRIL EVERY DAY   • losartan (COZAAR) 100 mg, Oral, Daily   • metoprolol succinate (TOPROL-XL) 100 mg 24 hr tablet TAKE 1 TABLET BY MOUTH EVERY DAY   • METRONIDAZOLE, TOPICAL, 0.75 % LOTN Apply externally, 2 times daily   • omeprazole (PriLOSEC) 40 MG capsule TAKE 1 CAPSULE BY MOUTH EVERY DAY   • omeprazole (PRILOSEC) 20 mg, Oral, Daily   • Xarelto 20 mg, Oral, Daily      Allergies   Allergen Reactions   • Erythromycin    • Sulfa Antibiotics    • Tetracycline      Social History     Socioeconomic History   • Marital status: /Civil Union     Spouse name: None   • Number of children: None   • Years of education: None   • Highest education level: None   Occupational History   • Occupation: Neuro ass    Tobacco Use   • Smoking status: Never   • Smokeless tobacco: Never   • Tobacco comments:     Former   Vaping Use   • Vaping status: Never Used   Substance and Sexual Activity   • Alcohol use: No   • Drug use: No   • Sexual activity: Not Currently   Other Topics Concern   • None   Social History Narrative        No caffeine use    Seeing a dentist     Social Drivers of Health     Financial Resource Strain: Not on file   Food Insecurity: Not on file   Transportation Needs: Not on file   Physical Activity: Not on file   Stress: Not on file   Social Connections: Not on file   Intimate Partner Violence: Not on file  "  Housing Stability: Not on file        Review of Systems   All other systems reviewed and are negative.        Vitals:  Vitals:    03/17/25 1348   BP: 124/86   BP Location: Left arm   Patient Position: Sitting   Cuff Size: Adult   Pulse: 78   Temp: 98.2 °F (36.8 °C)   TempSrc: Tympanic   SpO2: 96%   Weight: 101 kg (222 lb 6.4 oz)   Height: 5' 4\" (1.626 m)     BMI - Body mass index is 38.17 kg/m².  Wt Readings from Last 7 Encounters:   03/17/25 101 kg (222 lb 6.4 oz)   11/20/24 101 kg (222 lb)   11/06/24 100 kg (221 lb)   10/28/24 97.7 kg (215 lb 6.2 oz)   10/18/24 100 kg (221 lb)   10/18/24 100 kg (221 lb)   10/07/24 98.9 kg (218 lb)       Physical Exam  Vitals and nursing note reviewed.   Constitutional:       General: She is not in acute distress.     Appearance: Normal appearance. She is well-developed. She is obese. She is not ill-appearing.   HENT:      Head: Normocephalic and atraumatic.      Nose: No congestion.   Eyes:      General: No scleral icterus.     Conjunctiva/sclera: Conjunctivae normal.   Neck:      Vascular: No carotid bruit or JVD.   Cardiovascular:      Rate and Rhythm: Normal rate and regular rhythm.      Pulses: Normal pulses.      Heart sounds: Normal heart sounds. No murmur heard.     No friction rub. No gallop.   Pulmonary:      Effort: Pulmonary effort is normal. No respiratory distress.      Breath sounds: Normal breath sounds. No rales.   Abdominal:      General: There is no distension.      Palpations: Abdomen is soft.      Tenderness: There is no abdominal tenderness.   Musculoskeletal:         General: No swelling or tenderness.      Cervical back: Neck supple.      Right lower leg: No edema.      Left lower leg: No edema.   Skin:     General: Skin is warm.   Neurological:      General: No focal deficit present.      Mental Status: She is alert and oriented to person, place, and time. Mental status is at baseline.   Psychiatric:         Mood and Affect: Mood normal.         Behavior: " "Behavior normal.         Thought Content: Thought content normal.           Labs:  CBC:   Lab Results   Component Value Date    WBC 8.22 12/29/2022    RBC 5.04 12/29/2022    HGB 13.6 12/29/2022    HCT 43.0 12/29/2022    MCV 85 12/29/2022     12/29/2022    RDW 13.6 12/29/2022       CMP:   Lab Results   Component Value Date     10/26/2015    K 4.4 11/18/2024     11/18/2024    CO2 26 11/18/2024    ANIONGAP 8 10/26/2015    BUN 22 11/18/2024    CREATININE 1.06 11/18/2024    EGFR 55 11/18/2024    GLUCOSE 90 10/26/2015    CALCIUM 9.2 11/18/2024    AST 22 11/18/2024    ALT 37 11/18/2024    ALKPHOS 117 (H) 11/18/2024    PROT 7.5 10/26/2015    BILITOT 0.42 10/26/2015       Magnesium:  No results found for: \"MG\"    Lipid Profile:   Lab Results   Component Value Date    CHOL 156 10/26/2015    HDL 46 (L) 11/18/2024    TRIG 109 11/18/2024    LDLCALC 107 (H) 11/18/2024       Thyroid Studies:   Lab Results   Component Value Date    WSQ6JZQBREHB 2.080 11/18/2024    FREET4 1.2 03/13/2015       A1c:  No components found for: \"HGA1C\"    INR:  No results found for: \"INR\"5    Cardiac testing:   No results found for this or any previous visit.    No results found for this or any previous visit.    No results found for this or any previous visit.    No results found for this or any previous visit.      Colonoscopy  Narrative: Table formatting from the original result was not included.  [facility]  [address] [facility] [address]  [phone]    DATE OF SERVICE:  10/28/24    PHYSICIAN(S):  Attending:   Forrest Blandon MD     Fellow:   No Staff Documented     INDICATION:  History of colon polyps    POST-OP DIAGNOSIS:  See the impression below.    HISTORY:  Prior colonoscopy: 3 years ago.    BOWEL PREPARATION:  Miralax/Dulcolax    PREPROCEDURE:  Informed consent was obtained for the procedure, including sedation. Risks   including but not limited to bleeding, infection, perforation, " adverse   drug reaction and aspiration were explained in detail. Also explained   about less than 100% sensitivity with the exam and other alternatives. The   patient was placed in the left lateral decubitus position.    Procedure: Colonoscopy     DETAILS OF PROCEDURE:   Patient was taken to the procedure room where a time out was performed to   confirm correct patient and correct procedure. The patient underwent   monitored anesthesia care, which was administered by an anesthesia   professional. The patient's blood pressure, ECG, ETCO2, heart rate, level   of consciousness, oxygen and respirations were monitored throughout the   procedure. A digital rectal exam was performed. The scope was introduced   through the anus and advanced to the cecum. Retroflexion was performed in   the rectum. The quality of bowel preparation was evaluated using the   [place] Bowel Preparation Scale with scores of: right colon = 2, transverse   colon = 2, left colon = 2. The total BBPS score was 6. Bowel prep was   adequate. The patient experienced no blood loss. The procedure was not   difficult. The patient tolerated the procedure well. There were no   apparent adverse events.     ANESTHESIA INFORMATION:  ASA: III  Anesthesia Type: Anesthesia type not filed in the log.    MEDICATIONS:  No administrations occurring from 1038 to 1057 on 10/28/24     FINDINGS:  The cecum, ascending colon, hepatic flexure, transverse colon, splenic   flexure, descending colon, sigmoid colon, rectosigmoid and rectum appeared   normal.    EVENTS:  Procedure Events   Event Event Time   ENDO CECUM REACHED 10/28/2024 10:47 AM   ENDO SCOPE OUT TIME 10/28/2024 10:55 AM     SPECIMENS:  * No specimens in log *    EQUIPMENT:  Colonoscope -[id number]   Impression: The cecum, ascending colon, hepatic flexure, transverse colon, splenic   flexure, descending colon, sigmoid colon, rectosigmoid and rectum appeared   normal.    RECOMMENDATION:  Repeat colonoscopy in 5  years, due: 10/27/2029  Personal history of colon polyps                 Forrest Blandon MD

## 2025-03-17 NOTE — ASSESSMENT & PLAN NOTE
Overview  Self-reported known history of atrial fibrillation for about 3 years, but unfortunately no records available for the same -will obtain records from Dr. Garg's office   Reports that this was previously confirmed on cardiac monitor   She has been on metoprolol, Xarelto  No known prior procedures for A-fib, and all her episodes have been self resolving within a couple of hours  But over the last month she is having increased A-fib episodes on her Apple Watch, especially overnight  risk factors - increased stress at work, caffeine use  Impression  Paroxysmal atrial fibrillation, mild symptoms and self resolving episodes so far  Plan  Check 2-week ZIO AT monitor to evaluate A-fib and consider medical therapy for this  Check echocardiogram to evaluate LV function and valvular dysfunction  Counseled regarding need for risk factor optimization  Check sleep study  Limit caffeine intake  Weight loss recommended

## 2025-03-17 NOTE — ASSESSMENT & PLAN NOTE
Well-controlled, 124/86  Continue losartan 100 mg daily, metoprolol succinate 100 mg daily  Weight loss recommended

## 2025-03-20 ENCOUNTER — TELEPHONE (OUTPATIENT)
Age: 65
End: 2025-03-20

## 2025-03-20 ENCOUNTER — TRANSCRIBE ORDERS (OUTPATIENT)
Dept: SLEEP CENTER | Facility: CLINIC | Age: 65
End: 2025-03-20

## 2025-03-20 DIAGNOSIS — E66.09 CLASS 2 OBESITY DUE TO EXCESS CALORIES WITHOUT SERIOUS COMORBIDITY WITH BODY MASS INDEX (BMI) OF 38.0 TO 38.9 IN ADULT: ICD-10-CM

## 2025-03-20 DIAGNOSIS — E66.812 CLASS 2 OBESITY DUE TO EXCESS CALORIES WITHOUT SERIOUS COMORBIDITY WITH BODY MASS INDEX (BMI) OF 38.0 TO 38.9 IN ADULT: ICD-10-CM

## 2025-03-20 DIAGNOSIS — G47.33 OSA (OBSTRUCTIVE SLEEP APNEA): ICD-10-CM

## 2025-03-20 DIAGNOSIS — I48.0 PAROXYSMAL ATRIAL FIBRILLATION (HCC): Primary | ICD-10-CM

## 2025-04-04 DIAGNOSIS — Z86.0100 HISTORY OF COLON POLYPS: ICD-10-CM

## 2025-04-04 RX ORDER — OMEPRAZOLE 20 MG/1
20 CAPSULE, DELAYED RELEASE ORAL DAILY
Qty: 90 CAPSULE | Refills: 1 | Status: SHIPPED | OUTPATIENT
Start: 2025-04-04

## 2025-04-14 ENCOUNTER — TELEPHONE (OUTPATIENT)
Dept: CARDIOLOGY CLINIC | Facility: CLINIC | Age: 65
End: 2025-04-14

## 2025-04-14 NOTE — TELEPHONE ENCOUNTER
Called pt to ask why she didn't use the heart monitor. Pt said she was changing jobs and was without insurance for a little while, so she sent back the monitor.    Pt asked if the medical records from her last cardiologist, Dr. Garg, came through. Media tab was checked and no records found. Gave pt cardiology number to call back later to get information on where provider can fax records again.

## 2025-04-29 ENCOUNTER — HOSPITAL ENCOUNTER (OUTPATIENT)
Dept: NON INVASIVE DIAGNOSTICS | Facility: CLINIC | Age: 65
Discharge: HOME | End: 2025-04-29
Payer: COMMERCIAL

## 2025-04-29 VITALS
HEIGHT: 64 IN | WEIGHT: 222 LBS | DIASTOLIC BLOOD PRESSURE: 86 MMHG | SYSTOLIC BLOOD PRESSURE: 124 MMHG | HEART RATE: 78 BPM | BODY MASS INDEX: 37.9 KG/M2

## 2025-04-29 DIAGNOSIS — I48.0 PAROXYSMAL ATRIAL FIBRILLATION (HCC): ICD-10-CM

## 2025-04-29 LAB
AORTIC ROOT: 3.1 CM
ASCENDING AORTA: 3.4 CM
BSA FOR ECHO PROCEDURE: 2.04 M2
E WAVE DECELERATION TIME: 222 MS
E/A RATIO: 0.95
FRACTIONAL SHORTENING: 33 (ref 28–44)
INTERVENTRICULAR SEPTUM IN DIASTOLE (PARASTERNAL SHORT AXIS VIEW): 0.9 CM
INTERVENTRICULAR SEPTUM: 0.9 CM (ref 0.6–1.1)
LAAS-AP2: 16.4 CM2
LAAS-AP4: 17.4 CM2
LEFT ATRIUM SIZE: 4 CM
LEFT ATRIUM VOLUME (MOD BIPLANE): 44 ML
LEFT ATRIUM VOLUME INDEX (MOD BIPLANE): 21.5 ML/M2
LEFT INTERNAL DIMENSION IN SYSTOLE: 2.8 CM (ref 2.1–4)
LEFT VENTRICULAR INTERNAL DIMENSION IN DIASTOLE: 4.2 CM (ref 3.5–6)
LEFT VENTRICULAR POSTERIOR WALL IN END DIASTOLE: 1 CM
LEFT VENTRICULAR STROKE VOLUME: 51 ML
LV EF US.2D.A4C+ESTIMATED: 73 %
LVSV (TEICH): 51 ML
MV E'TISSUE VEL-SEP: 8 CM/S
MV PEAK A VEL: 1.08 M/S
MV PEAK E VEL: 103 CM/S
MV STENOSIS PRESSURE HALF TIME: 64 MS
MV VALVE AREA P 1/2 METHOD: 3.44
RA PRESSURE ESTIMATED: 3 MMHG
RIGHT ATRIUM AREA SYSTOLE A4C: 13.9 CM2
RIGHT VENTRICLE ID DIMENSION: 3.3 CM
SL CV LEFT ATRIUM LENGTH A2C: 5.1 CM
SL CV LV EF: 60
SL CV PED ECHO LEFT VENTRICLE DIASTOLIC VOLUME (MOD BIPLANE) 2D: 79 ML
SL CV PED ECHO LEFT VENTRICLE SYSTOLIC VOLUME (MOD BIPLANE) 2D: 28 ML
TRICUSPID ANNULAR PLANE SYSTOLIC EXCURSION: 2 CM

## 2025-04-29 PROCEDURE — 93306 TTE W/DOPPLER COMPLETE: CPT

## 2025-04-29 PROCEDURE — 93306 TTE W/DOPPLER COMPLETE: CPT | Performed by: INTERNAL MEDICINE

## 2025-05-07 ENCOUNTER — RESULTS FOLLOW-UP (OUTPATIENT)
Dept: CARDIOLOGY CLINIC | Facility: CLINIC | Age: 65
End: 2025-05-07

## 2025-05-07 NOTE — PROGRESS NOTES
PT Evaluation     Today's date: 2023  Patient name: Seth Enriquez  : 1960  MRN: [id number]  Referring provider: Gabby Dacosta DO  Dx:   Encounter Diagnosis     ICD-10-CM    1. Primary osteoarthritis of left knee  M17.12 Ambulatory Referral to Physical Therapy      2. Chronic pain of both knees  M25.561 Ambulatory Referral to Physical Therapy    M25.562     G89.29       3. Primary osteoarthritis of right knee  M17.11 Ambulatory Referral to Physical Therapy                     Assessment  Assessment details: Patient was provided a home exercise program and demonstrated an understanding of exercises. Patient was advised to stop performing home exercise program if symptoms increase or new complaints developed. Verbal understanding demonstrated regarding home exercise program instructions. Patient would benefit from skilled physical therapy services for prescribed exercises, manual interventions, neuromuscular re-education, education, and modalities as deemed appropriate to assist patient in achieving their maximum level of function. Patient presents with c/o chronic bilateral knee pain L > R -  MRI - years ago revealed meniscal involvement - recent x-rays reveal moderate OA. Evaluation reveals:  Loss of extension ROM L > R, bilateral le weakness, abnormal gait pattern, decline in functional and recreational abilities. Special testing elicits discomfort left with medial meniscal and MCL irritation as well as R MCl with (+) testing. Patient would like to focus on HEP and participate in PT 1x/week every other week due to demanding personal schedule and higher co-pay. Impairments: abnormal gait, abnormal or restricted ROM, abnormal movement, activity intolerance, impaired physical strength, lacks appropriate home exercise program, pain with function and poor posture   Understanding of Dx/Px/POC: good  Goals  STG   1.   Patient will demonstrate independence and competence with HEP 2 -4 weeks  2. Patient will report > 25% reduced pain 2-4 weeks    LTG   1. Patient will report improvements with both functional and recreational abilities  4-6 weeks  2. Patient will demonstrate improved motor function bilateral le's. 4-6 weeks. 3.  Improved knee extension bilaterally by 1-2 degrees right and > 5 degrees left  4-8 weeks. 4.  Patient will tolerate walking/ standing/ stair climbing with 50 % less pain reported  4-6 weeks. Plan  Plan details: Patient response to treatment will be monitored each session and progressed accordingly    Thank you for this referral.   Patient would benefit from: skilled physical therapy  Planned modality interventions: cryotherapy and thermotherapy: hydrocollator packs  Planned therapy interventions: IADL retraining, manual therapy, patient education, postural training, strengthening, stretching, therapeutic exercise, flexibility, home exercise program, kinesiology taping, neuromuscular re-education and gait training  Frequency: 2x month  Plan of Care expiration date: 2023  Treatment plan discussed with: patient        Subjective Evaluation    History of Present Illness  Mechanism of injury: 2020-  Overextended her left knee to lift heavy bin of coal -  Had MRI   - torn meniscus   ( she did not address it secondary to covid  )   1 year later - x-ray = arthritis.     Pain continued and [facility] [address] [facility]    No injection to date -  No further diagnostic     She has been very busy and unable to attend PT until now ( script written 2023 )      Patient Goals  Patient goals for therapy: decreased pain, independence with ADLs/IADLs and increased strength    Pain  Current pain ratin  At worst pain ratin  Quality: dull ache  Aggravating factors: walking, stair climbing and standing  Progression: worsening    Social Support  Steps to enter house: yes  Lives in: [facility]  Lives with: significant other and parents    Employment status: working (sit down 2 jobs)    Diagnostic Tests  X-ray: abnormal        Objective     Observations   Left Knee   Positive for effusion. Additional Observation Details  Girth R sup = 45.7  Inf = 41.2  L suo = 45.7  Inf = 40.2      (+) fat pad hypertrophy     Tenderness   Left Knee   Tenderness in the medial joint line. Neurological Testing     Sensation     Knee   Left Knee   Intact: light touch    Right Knee   Intact: light touch     Active Range of Motion   Left Knee   Flexion: 130 degrees   Extension: -12 degrees     Right Knee   Flexion: 136 degrees   Extension: -2 degrees     Passive Range of Motion   Left Knee   Flexion: 133 degrees   Extension: -9 degrees with pain    Strength/Myotome Testing     Left Knee   Flexion: 4+  Extension: 4  Quadriceps contraction: fair    Right Knee   Flexion: 4+  Extension: 4+  Quadriceps contraction: good    Additional Strength Details  Hip flexion 4+/5 bilaterally     Tests     Left Knee   Positive medial Keith, valgus stress test at 0 degrees and valgus stress test at 30 degrees. Negative anterior drawer, anterior Lachman and posterior drawer. Right Knee   Positive valgus stress test at 0 degrees and valgus stress test at 30 degrees. Negative anterior drawer, anterior Lachman, medial Keith and posterior drawer. General Comments:      Knee Comments  GAIT - slowed chad, decreased stance time, decreased push off left, diminished arm swing bilaterally , slightly widened onofre              Precautions: L < R knee pain - moderate OA     [email or web address]  Access Code: [email or web address]    POC expires Auth Status Unit limit Start date  Expiration date PT/OT + Visit Limit?    9-14-23 pending 3 3/16/23 3/16/24 BOMN                                     Visit/Unit Tracking  AUTH Status:  Date 7/15              Pending  Used 1               Remaining                        Manuals 7/15                         Manual knee ext bilaterally                                        Neuro Re-Ed                          Qs, add sets 3s x 20                                                                             Ther Ex             bike             Heel slide 20x             slr 20x ea             S/l abd 20x             hss w/ strap 20s x 3 bilat            Prone QS             Prone quad stretch w/ strap             Standing HR, hip abd, hs curls             Squats @ bar                          Sidestepping TB                                                    Ther Activity                                       Gait Training                                       Modalities none

## 2025-05-20 NOTE — PROGRESS NOTES
Diagnoses and all orders for this visit:    Encounter for gynecological examination without abnormal finding    Encounter for osteoporosis screening in asymptomatic postmenopausal patient  -     DXA bone density spine hip and pelvis; Future    Other orders  -     Cancel: Liquid-based pap, screening        Advised to call with any Post Menopausal bleeding.   Calcium/ Vit D dietary requirements discussed,   Weight bearing exercises minium of 150 mins/weekly advised.   Kegel exercises advised daily, see AVS for instructions and recommendations  Reviewed perineal hygiene and vaginal dryness post menopause  SBE and yearly mammography advised.  ASCCP guidelines reviewed. Will discontinue PAP's according to recommendations. Condoms encouraged with all sexual activity to prevent STI's.   Health maintenance encouraged with PMD, remain current with Colonoscopy, Dexa scan, and recommended vaccines.  Advised to call with any issues, all concerns & questions addressed.   See after visit summary for further information and recommendations to the above mentioned subjects which we may or may not have covered in detail during your visit   F/U annually or biennial if Medicare       Health Maintenance:    Last PAP: 2018 Neg/Neg  Next PAP Due:collected today     Last Mammogram:2023  Life time Keyur Cummins % 9.11, Density A almost entirely fatty, Bi-Rads 2 Benign  Next Mammogram: Order given by primary     Last Colonoscopy:10/28/2024 RTO in 5 years    Last DEXA: Not on file, ordered , advised to schedule    Subjective    CC: Yearly Exam , New patient     Pleasant 64 y.o. , female   postmenopausal here for annual exam.   GYN hx includes: 1 vaginal delivery , S/P Hysterectomy in 2006, for AUB  Family Hx: PGM- Breast cancer(D) Maternal Aunt- Urinary bladder (D)  She reports no new changes in her health. Medically stable     Denies history of abnormal pap smears.  Denies abnormal Mammograms   Denies postmenopausal  "bleeding   Denies issues with vasomotor symptoms  Reports vaginal itching   Denies pelvic pain   Reports symptoms of pelvic organ prolapse, urinary, or fecal incontinence. Has urge incontinence advised Kegels and pelvic floor strengthening exercises cognitive behavioral training.  May go to physical therapy if unable to manage on her own  Is not sexually active. Monogamous relationship.  is ill with COPD  Denies STI concerns. 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    Family History   Problem Relation Name Age of Onset    COPD Mother Mom     Heart failure Mother Mom     Hyperlipidemia Mother Mom     Hypertension Mother Mom     Throat cancer Mother Mom     COPD Father          moderate    No Known Problems Sister      No Known Problems Maternal Grandmother      Breast cancer Paternal Grandmother Grandmother 70    Cancer Maternal Aunt Luisana         Urinary bladder    No Known Problems Maternal Aunt         Vitals:    05/23/25 0734   BP: 110/80   BP Location: Left arm   Patient Position: Sitting   Cuff Size: Standard   Weight: 103 kg (226 lb)   Height: 5' 4\" (1.626 m)     Body mass index is 38.79 kg/m².    Review of Systems     Constitutional: Negative for chills, fatigue, fever, headaches, visual disturbances, and unexpected weight change.   Respiratory: Negative for cough, & shortness of breath.  Cardiovascular: Negative for chest pain. .    Gastrointestinal: Negative for Abd pain, nausea & vomiting, constipation and diarrhea.   Genitourinary: Negative for difficulty urinating, dysuria, hematuria, , unusual vaginal bleeding or discharge. dyspareunia  Skin: Negative skin changes    Physical Exam     Constitutional: Alert & Oriented x3, well-developed and well-nourished. No distress.   HENT: Atraumatic, Normocephalic, Conjunctivae clear  Neck: Normal range of motion.   Abdominal: Soft. No tenderness or masses  Musculoskeletal: Normal ROM  Skin: Warm & " Dry  Psychological: Normal mood, thought content, behavior & judgement     Breasts:   Right: tissue soft without masses, tenderness, skin changes or nipple discharge. No areas of erythema or pain. No subclavicular, axillary, pectoral adenopathy  Left:  tissue soft without masses, tenderness, skin changes or nipple discharge. No areas of erythema or pain. No subclavicular, axillary, pectoral adenopathy    Pelvic exam was performed with patient supine, lithotomy position.     Exam is consistent with  atrophic changes.  Vulva/ Vestibule: Right: Negative rash, tenderness, lesion or injury                               Left: Negative rash, tenderness, lesion or injury   Urethral meatus: Negative for  tenderness, inflammation or discharge.   Uterus: Surgically absent   Cervix:  Surgically absent   Right adnexa: Not palpable   Left adnexa: Not palpable  Vagina: Consistent with  atrophic changes. No erythema, tenderness, masses, or foreign body in the vagina. No signs of injury around the vagina. No unusual vaginal discharge   Perineum without lesions, signs of injury, erythema or swelling.  Inguinal Canal:        Right: No inguinal adenopathy or hernia present.        Left: No inguinal adenopathy or hernia present.

## 2025-05-22 NOTE — PATIENT INSTRUCTIONS
Patient Education     Lowering Your Risk of Breast Cancer   About this topic   Breast cancer is a serious illness. Breast cancer is when abnormal cells grow and divide more quickly in your breast. These cells form a growth or tumor. The abnormal cells may enter nearby tissue and spread to other parts of the body. It is the type of cancer most often seen in women. Men can have breast cancer, but it is a rare condition.  General   Some things in your life may increase your risk of breast cancer. You may not be able to change some of these. Others you can control.  You are more likely to get breast cancer if you:  Have a mother, sister, or daughter who has had breast cancer  Have used hormones for menopause for more than 5 years  Have had radiation therapy to the breast or chest in the past  Are overweight or do not exercise  Had your first menstrual period before you were 10 years old  Went through menopause after age 54  Have never been pregnant or had your first child after age 34  Have had breast cancer before  Drink alcohol in any form  Have dense breasts  Are older in age  There is no certain way to prevent breast cancer. There are things you can do to lower your chances of having breast cancer.  Keep a healthy weight. Lose weight if you are overweight. Being overweight raises your chances of having breast cancer.  Eat a healthy diet to maintain a healthy weight, such as more fruits, vegetables, and lean cuts of meat. Decrease the amount of saturated fat in your diet.  Exercise. Being active helps you keep a healthy weight.  Limit your alcohol intake or do not drink alcohol. The more alcohol you drink, the higher your risk.  Do not smoke cigarettes. Smoking can increase your risk of many types of cancer.  Breastfeed your baby. This may help protect you. The longer you breastfeed, the more protection you have.  Talk with your doctor about:  Limiting or stopping hormone therapy.  Taking certain drugs to prevent  breast cancer. For women at high risk of having breast cancer, there are a few drugs that may lower your risk.  Surgery to prevent you from having breast cancer if you are very high risk.  When do I need to call the doctor?   Changes in your breasts  A lump or area in your breast that feels different  Discharge from your nipple  Skin on your breast is dimpled or indented  You have questions or concerns about your breasts  Helpful tips   Talk to your doctor about the best kind of breast cancer screening for you.  If you want to do self breast exams, have your doctor show you the right way to do them.  Tell your doctor of any abnormal finding.  Last Reviewed Date   2021-10-04  Consumer Information Use and Disclaimer   This generalized information is a limited summary of diagnosis, treatment, and/or medication information. It is not meant to be comprehensive and should be used as a tool to help the user understand and/or assess potential diagnostic and treatment options. It does NOT include all information about conditions, treatments, medications, side effects, or risks that may apply to a specific patient. It is not intended to be medical advice or a substitute for the medical advice, diagnosis, or treatment of a health care provider based on the health care provider's examination and assessment of a patient’s specific and unique circumstances. Patients must speak with a health care provider for complete information about their health, medical questions, and treatment options, including any risks or benefits regarding use of medications. This information does not endorse any treatments or medications as safe, effective, or approved for treating a specific patient. Up[employer] Inc[employer] and its affiliates disclaim any warranty or liability relating to this information or the use thereof. The use of this information is governed by the Terms of Use, available at  https://www.[employer].com/en/know/clinical-effectiveness-terms   Copyright   Copyright © 2024 [employer], Inc. and its affiliates and/or licensors. All rights reserved.       Perineal Hygiene      Your vaginal naturally takes care of its self, it is a self washing system, the less you mess the healthier it will be     Please do not use any anti bacterial soaps,  liquid soaps, body wash or feminine wash to the vulva, these products can cause dermitis, bacterial infections and other vulvar problems.   Your partner should avoid the same products as well to genital area.  Use only water to cleanse vulva, if you feel you need soap you may use a small amount of  Dove White Bar or Dove White Sensitive Skin Bar soap ( no liquid soap) if necessary.    Avoid the use of washcloths, exfoliating armaan's, netted scrubbers, or loofa's, use your hands only to cleanse the vulvar tissues    No scented lotions or products are advised in or near your vulva.    Use coconut oil in its solid form for moisture if needed.  No douching this may cause imbalance in your vaginal PH and further issues.    If you wear panty liners, you may apply a thin coating of Vaseline, A&D ointment or coconut oil to the vulvar tissues as a skin barrier     Wear Cotton underware, and loose fitting clothing  Use perfume-free, dye-free laundry detergent for under garments, use a second rinse cycle   Avoid fabric softeners/dryer sheets.         Over the counter probiotic to restore vaginal toni may be helpful as well, take daily.       You may also look into Boric Acid vaginal suppositories to restore vaginal PH balance for up to 2 weeks as directed on the box. You may not use these if you are pregnant      For vaginal dryness:      You may use:     Coconut oil in solid form, not liquid (organic, pure, unscented) as needed for moisture or lubrication. ( Do not use if allergic)       Replens moisture restore external comfort gel daily ( use as directed on the  box)        Replens long lasting vaginal moisturizer  ( use as directed on the box)     May try NewLife vulvar moisturizer ( found on [employer] )    May try Revaree vaginal inserts        For Vaginal Lubrication:          You may use:     Coconut oil in solid form, not liquid (organic, pure, unscented) as a lubricant or another scent-free lubricant (Astroglide, Uberlube) if needed.  Do not use coconut oil or silicone if using a condom as this may break down the integrity of the condom and cause an unplanned pregnancy              Do not use coconut oil if allergic               Replens silky smooth lubricant, premium silicone based lubricant for intercourse. ( use as directed, a small amount will provide an enhanced natural feeling)     Any premium over the counter vaginal lubricant water or silicone based. Silicone based will have more staying power and friction relief         For Vulvar irritation/itching:        You may use Hydrocortisone 1 % over the counter to external vulvar tissues 2 x daily for 5-7 days to help with irriation and itch relief.  Patient Education     Pelvic Floor Exercises   About this topic   The pelvic floor consists of muscles and strong bands of tissues which support all of the organs in your pelvis. Some of these organs are the bladder and the small and large bowel as well as the womb and the prostate. If the muscles and tissues get weak, your organs may drop. This can lead to other problems. Your urine may leak when you laugh, sneeze, or cough. You may not be able to drain the bladder fully. You may have less problems if you do exercises to strengthen your pelvic floor and abdominal muscles.  Kegel exercises help make the muscles in the pelvic floor stronger. Anyone can do them. It is also important to make your abdominal muscles stronger. In order for these exercises to work, you must be consistent when doing them.  General   Before starting with a program, ask your doctor if you are  healthy enough to do these exercises. Your doctor may have you work with a  or physical therapist to make a safe exercise program to meet your needs.  Strengthening Exercises   Kegel exercises keep your pelvic muscles firm and strong. You can do these in many different positions. Start by lying down with your knees bent and feet on the bed. Squeeze the pelvic muscles as if you are trying to stop the flow of urine. Hold these muscles for a count of 3, and then slowly relax them for a count of 3. Try to work up to squeezing for a count of 10 and slowly relaxing for a count of 10. Increase the muscle squeeze until you get to 10. When relaxing, slowly relax to a count of 10.  Breathe out when you are squeezing and breathe in when you are relaxing. Your goal is to try to do 10 Kegels 3 or more times each day. Take time to rest between sets. Be sure to only contract your pelvic floor muscles, not your buttocks, thighs, or abdominal muscles.  Pelvic floor contractions ? There are a few ways to feel the pelvic muscles contract.  When you are passing urine, try suddenly stopping your flow of urine. Do not do this on a regular basis, but only to feel what the contraction feels like. Doing this while passing urine can lead to other problems.  Put a finger into the vagina or rectum. Contract the muscles around your finger as if you were trying to stop the flow of urine or stop the passing of gas.  Place two chairs without arms about 2 inches (5 cm) apart. Sit so you have one butt cheek on each chair. Now, try [place] your pelvic floor muscles. This will help you keep from using other muscles.  Pelvic tilts ? Lie on your back with your knees bent and feet flat on the floor. Tighten your stomach muscles and press your lower back down to the floor. Try doing Kegels with this exercise when your back is flattened. Hold 3 to 5 seconds. Relax.  Straight leg raises lying down ? Lie on your back with one leg straight. Bend  your other knee so the foot is flat on the bed. Keeping your leg straight, lift the leg up to the level of your other knee. Lower it back down. Repeat with the other leg.  Hip lifts ? Lie on your back with your knees bent and feet flat on the floor. Tighten your stomach muscles and lift your buttocks off the floor. Try doing Kegels when up in this position. Hold 3 to 5 seconds. Relax.  Abdominal bracing ? Do this exercise in different positions: Lying down, sitting, and standing. Tighten your stomach muscles. While keeping the stomach muscles tight, tighten your pelvic floor muscles. Now, forcefully laugh or cough to see if you were able to prevent urine from leaking.  Abdominal crunches ? Lie on your back with both knees bent. Keep your feet flat on the floor. Place your hands in one of these positions. Try starting with the first position since it is the easiest. As you get better, use the other positions to make it harder.  Crunches with arms at sides.  Crunches with arms across chest.  Crunches with arms behind head. Be careful not to interlock your fingers behind your neck or head while doing crunches. This may add tension to your neck and cause strain.  Look at the ceiling. Tighten your belly muscles and lift your shoulders and upper back off the floor. Breathe out while you are doing this. Lower your shoulders to the floor. Breathe in while you are doing this. Relax your belly muscles all the way before starting another crunch.             What will the results be?   Less leakage of urine when you cough, sneeze, laugh, or run  Fewer strong urges to pass urine  Fewer trips to the bathroom each day  Less risk of organs, such as the uterus or bladder, dropping into the vagina  Faster recovery after childbirth or prostate surgery  Stronger core muscles  Increased sensitivity during sex  Helpful tips   You can also try doing a different kind of Kegels. Do 5 quick, strong pelvic floor contractions. Sometimes, if  you have an urge to pass urine but are not near a bathroom, you can do this kind of Kegel to calm the urge.  Stay active and work out to keep your muscles strong and flexible.  Keep a healthy weight to avoid putting too much stress on your spine. Eat a healthy diet to keep your muscles healthy.  Be sure you do not hold your breath when exercising. This can raise your blood pressure. If you tend to hold your breath, try counting out loud when exercising. If any exercise bothers you, stop right away.  Try walking or cycling at an easy pace for a few minutes to warm up your muscles. Do this again after exercising.  Doing exercises before a meal may be a good way to get into a routine. A good time to do these exercises is each time you are stopped at a stop light while driving.  Exercise may be slightly uncomfortable, but you should not have sharp pains. If you do get sharp pains, stop what you are doing. If the sharp pains continue, call your doctor.  Last Reviewed Date   2021-03-31  Consumer Information Use and Disclaimer   This generalized information is a limited summary of diagnosis, treatment, and/or medication information. It is not meant to be comprehensive and should be used as a tool to help the user understand and/or assess potential diagnostic and treatment options. It does NOT include all information about conditions, treatments, medications, side effects, or risks that may apply to a specific patient. It is not intended to be medical advice or a substitute for the medical advice, diagnosis, or treatment of a health care provider based on the health care provider's examination and assessment of a patient’s specific and unique circumstances. Patients must speak with a health care provider for complete information about their health, medical questions, and treatment options, including any risks or benefits regarding use of medications. This information does not endorse any treatments or medications as safe,  "effective, or approved for treating a specific patient. UpToDate, Inc. and its affiliates disclaim any warranty or liability relating to this information or the use thereof. The use of this information is governed by the Terms of Use, available at https://www.[employer].com/en/know/clinical-effectiveness-terms   Copyright   Copyright © 2024 UpToDate, Inc. and its affiliates and/or licensors. All rights reserved.  Patient Education     Menopause   The Basics   Written by the doctors and editors at [employer]   What is menopause? -- Menopause is the time in life when monthly periods naturally stop. At this time, the ovaries stop releasing eggs and stop making the hormones estrogen and progesterone.  Menopause usually occurs between the ages of 45 and 55. The average age is 51.  Menopause does not happen all at once. It is a \"transition\" that takes years. It can cause symptoms that are difficult for a lot of people. But there are treatments that can help.  How do I know if I am going through menopause? -- You might wonder about menopause when your periods start to change. If you are going through menopause, you might:   Have periods more or less often than usual (for example, every 5 to 6 weeks instead of every 4)   Have shorter periods than before   Skip 1 or more periods   Have symptoms like hot flashes  If you have had a hysterectomy (surgery to remove your uterus), but you still have your ovaries, it might be tough to tell when you are going through menopause. Still, you can have menopause symptoms even if you no longer have a uterus.  If your ovaries were removed before the usual age of menopause, you had what doctors call \"surgical menopause.\" That just means that you went through it early, because your ovaries were removed.  What are the symptoms of menopause? -- Some people go through menopause without symptoms. But most have 1 or more of these symptoms:   Hot flashes - Hot flashes feel like a wave of heat " "that starts in your chest and face and then moves through your body. Hot flashes usually start happening before you stop having periods.   Night sweats - When hot flashes happen during sleep, they are called \"night sweats.\" They can make it hard to get a good night's sleep.   Sleep problems - During the transition to menopause, some people have trouble falling or staying asleep. This can happen even if night sweats are not a problem.   Vaginal dryness - Menopause can cause the vagina and tissues near the vagina to become dry and thin. This usually starts a few years after menopause. It can be uncomfortable or make sex painful.   Depression - During the transition to menopause, many people start having symptoms of depression or anxiety. This is more likely if you have had depression before. Depression symptoms include:   Sadness   Losing interest in doing things   Sleeping too much or too little   Trouble concentrating or remembering things - This might be caused by lack of sleep that often happens at menopause, or by the lack of estrogen. Some experts suspect that estrogen is important for good brain function.   Headaches - If you get migraine headaches related to your period, these might get worse during menopause.   Joint pain - Some people have joint aches or pains during and after menopause.  Many of these symptoms get better after menopause. But some people continue to have hot flashes, even for years afterwards.  Should I see a doctor or nurse? -- It depends. If your periods start changing and you are 45 or older, you do not need to see your doctor for this reason alone. But you should tell them if you have symptoms that bother you.  For example, see your doctor if you cannot sleep because of night sweats, if it is hard to work because of your hot flashes, or if you feel sad or down and don't seem to enjoy things anymore. If your regular doctor cannot recommend treatments that can help, you might consider " "looking for a doctor who is a specialist in menopause or women's health. They might have more information about ways to relieve symptoms.  You should also see a doctor or nurse if you:   Have your period more often than every 3 weeks   Have very heavy bleeding during your period   Have bleeding or \"spotting\" between periods   Have been through menopause (have gone 12 months without a period) and start bleeding again, even if it's just a spot of blood  Is there a test for menopause? -- There is a test that can help tell if you are going through menopause. But doctors usually use this only in people who are too young to be in menopause or who have special circumstances.  Can I still get pregnant? -- As long as you are still having periods, even if they do not happen often, it is possible to get pregnant. If you have sex and do not want to get pregnant, use some form of birth control.  If you have not had a period for a full year, it is probably safe to say you have been through menopause and can no longer get pregnant.  How are the symptoms of menopause treated? -- There are treatments that can help relieve symptoms.  Treatments for hot flashes include:   Hormone therapy - The hormone estrogen is the most effective treatment for menopause symptoms. Most people need to take estrogen with another hormone, called progesterone. People who have had a hysterectomy (surgery to remove the uterus) can take estrogen by itself. Experts think that these hormones are effective and safe for most people.  If you want to take hormones, ask your doctor or nurse if it is an option for you. You should not take hormones if you have had breast cancer, a heart attack, a stroke, or a blood clot.   Antidepressants - Some types of antidepressants can ease hot flashes and depression. Even if you do not have depression, these medicines can still help with hot flashes. They are often used by people who have had breast cancer and cannot take " "estrogen.   Anti-seizure medicine - One of the medicines used to prevent seizures seems to help some people with hot flashes, even if they do not have seizures.  Treatments for vaginal dryness include:   Vaginal estrogen - Vaginal estrogen is any form of estrogen that goes directly into the vagina. It comes in creams, tablets, or a flexible ring. Vaginal estrogen comes in small doses that don't increase the levels of estrogen in other parts of the body very much.   Other medicines - In most cases, doctors recommend vaginal estrogen. That's because there is more evidence that it helps with vaginal dryness compared with other medicines. But if you do not want to use vaginal estrogen, your doctor can suggest other options. For example:   You might choose to use a vaginal moisturizer several times a week. Vaginal moisturizers (sample brand names: Replens, K-Y SILK-E) do not contain hormones. They help keep the vagina moist all of the time. You can also add a lubricant (sample brand names: Astroglide, K-Y Jelly) when you have sex.   In some cases, doctors might suggest another medicine. For example, there is a tablet that you insert into the vagina once a day. There is also a pill that might help some people who cannot use vaginal products.  Some doctors are not used to prescribing hormone therapy for menopause. If you feel that you are not getting the help you need, you might choose to talk to a different doctor who is an expert in menopause treatment. You can ask your doctor or nurse to refer you to someone.  Can I do anything on my own to reduce the symptoms of menopause? -- Yes. There are some steps you can try (table 1). But ask your doctor before you take any \"natural remedies,\" especially if you have a history of breast cancer. Things like herbs and supplements are not proven to help, and in some cases, could harm you.  What can I do to protect my bones? -- You can:   Take calcium and vitamin D supplements.   Be " active (physical activity helps keep bones strong).   Ask your doctor when you should start having bone density tests.  If needed, your doctor can prescribe medicines to help keep your bones strong.  All topics are updated as new evidence becomes available and our peer review process is complete.  This topic retrieved from [employer] on: Feb 26, 2024.  Topic 33618 Version 11.0  Release: 32.2.4 - C32.56  © 2024 UpToDate, Inc. and/or its affiliates. All rights reserved.  table 1: Ways to cope with menopause symptoms  Symptom  What you can do    Hot flashes and night sweats Dress in layers so you can take off clothes if you get hot.    Keep your home at a comfortable temperature. Avoid hot drinks, such as coffee and tea.    Put a cold, wet washcloth against your neck during hot flashes.    Quit smoking, if you smoke. (Smoking makes hot flashes worse.) If you are having trouble quitting, your doctor or nurse can help.   Vaginal dryness Use a vaginal moisturizer a few times a week (sample brand names: Replens, K-Y SILK-E).    Use a lubricant before sex (sample brand names: Astroglide, K-Y Jelly).   Sleep problems Try to go to sleep and get up at the same time every day, even when you don't sleep well.    Avoid caffeine in the afternoon, and limit alcohol.   Depression Try to stay active. Exercise can help your mood.    Seek support from other people going through menopause.   Graphic 75705 Version 4.0  Consumer Information Use and Disclaimer   Disclaimer: This generalized information is a limited summary of diagnosis, treatment, and/or medication information. It is not meant to be comprehensive and should be used as a tool to help the user understand and/or assess potential diagnostic and treatment options. It does NOT include all information about conditions, treatments, medications, side effects, or risks that may apply to a specific patient. It is not intended to be medical advice or a substitute for the medical advice,  "diagnosis, or treatment of a health care provider based on the health care provider's examination and assessment of a patient's specific and unique circumstances. Patients must speak with a health care provider for complete information about their health, medical questions, and treatment options, including any risks or benefits regarding use of medications. This information does not endorse any treatments or medications as safe, effective, or approved for treating a specific patient. UpToDate, Inc. and its affiliates disclaim any warranty or liability relating to this information or the use thereof.The use of this information is governed by the Terms of Use, available at https://www.[employer].com.[employer]/en/know/clinical-effectiveness-terms. 2024© UpToDate, Inc. and its affiliates and/or licensors. All rights reserved.  Copyright   © 2024 UpToDate, Inc. and/or its affiliates. All rights reserved.      Patient Education     Urinary incontinence in females   The Basics   Written by the doctors and editors at [employer]   What is urinary incontinence? -- Urinary incontinence is the medical term for when a person leaks urine or loses bladder control.  Incontinence is a very common problem, but it is not a normal part of aging. If you have this problem, there are treatments that can help. There are also things that you can do on your own to stop or reduce urine leakage so you don't have to \"just live with it.\"  What are the symptoms of incontinence? -- There are different types of incontinence. Each causes different symptoms. The 3 most common types are:   Stress incontinence - With stress incontinence, you leak urine when you laugh, cough, sneeze, or do anything that \"stresses\" the belly. Stress incontinence is most common in females, especially those who have had a baby.   Urgency incontinence - With urgency incontinence, you feel a strong need to urinate all of a sudden. This is also known as \"urge incontinence.\" Often, the " "\"urge\" is so strong that you can't make it to the bathroom in time. \"Overactive bladder\" is another term for having a sudden, frequent urge to urinate. People with overactive bladder might or might not actually leak urine.   Mixed incontinence - With mixed incontinence, you have symptoms of both stress and urgency incontinence.  Is there anything I can do on my own to feel better? -- Yes. Here are some things that can help reduce urine leaks:   Reduce the amount of liquid that you drink, especially a few hours before bed.   Cut down on any foods or drinks that make your symptoms worse. Some people find that alcohol, caffeine, or spicy or acidic foods irritate the bladder.   Try to lose weight, if you are overweight. Your doctor or nurse can help you do this in a healthy way.   If you have diabetes, keep your blood sugar as close to your goal level as possible.   If you take medicines called diuretics, plan ahead. These medicines increase the need to urinate. Try to take them when you know you will be near a bathroom for a few hours. If you keep having problems with leakage because of diuretics, ask your doctor if you can take a lower dose or switch to a different medicine.  These techniques can also help improve bladder control:   Bladder retraining - During bladder retraining, you go to the bathroom at scheduled times. For instance, you might decide that you will go every hour. Make yourself go every hour, even if you don't feel like you need to. Try to wait the whole hour, even if you need to go sooner. Then, once you get used to going every hour, increase the amount of time you wait in between bathroom visits. Over time, you might be able to \"retrain\" your bladder to wait 3 or 4 hours between bathroom visits.   Pelvic floor muscle training - This involves learning exercises to strengthen and relax your pelvic muscles. These include the muscles that control the flow of urine and bowel movements. When done right, " these exercises can help. But people often do them wrong. Ask your doctor or nurse how to do them right. Your doctor might suggest working with a physical therapist who has special training in these exercises.  Should I see my doctor or nurse? -- Yes. Your doctor or nurse can find out what might be causing your incontinence. They can also suggest ways to relieve the problem.  When you speak to your doctor or nurse, ask if any of the medicines you take could be causing your symptoms. Some medicines can cause incontinence or make it worse.  Some people choose to wear pads or special underwear. These can help if you accidentally leak urine once in a while. But they can also cause skin irritation if you use them a lot. If you have incontinence, ask your doctor or nurse how to treat it.  How is incontinence treated? -- The treatment options differ depending on what type of incontinence you have. Some of the options include:   Medicines to relax the bladder   Surgery to repair the tissues that support the bladder or to improve the flow of urine   Electrical stimulation of the nerves that relax the bladder  Urinary incontinence is more common in people who have been through menopause. (Menopause is when you stop having monthly periods). Some people have vaginal dryness after menopause. If this is the case for you, a treatment called vaginal estrogen might help.  What will my life be like? -- Many people with incontinence can regain bladder control or at least reduce the amount of leakage they have. The most important thing is to tell your doctor or nurse. Then, work with them to find an approach that helps you.  All topics are updated as new evidence becomes available and our peer review process is complete.  This topic retrieved from [employer] on: Feb 26, 2024.  Topic 82093 Version 18.0  Release: 32.2.4 - C32.56  © 2024 UpToDate, Inc. and/or its affiliates. All rights reserved.  figure 1: Location of the bladder     This  drawing shows the side view of a woman's body. The bladder is in front of the vagina. The urethra is the tube that carries urine from the bladder out of the body.  Graphic 449413 Version 1.0  Consumer Information Use and Disclaimer   Disclaimer: This generalized information is a limited summary of diagnosis, treatment, and/or medication information. It is not meant to be comprehensive and should be used as a tool to help the user understand and/or assess potential diagnostic and treatment options. It does NOT include all information about conditions, treatments, medications, side effects, or risks that may apply to a specific patient. It is not intended to be medical advice or a substitute for the medical advice, diagnosis, or treatment of a health care provider based on the health care provider's examination and assessment of a patient's specific and unique circumstances. Patients must speak with a health care provider for complete information about their health, medical questions, and treatment options, including any risks or benefits regarding use of medications. This information does not endorse any treatments or medications as safe, effective, or approved for treating a specific patient. UpToDate, Inc. and its affiliates disclaim any warranty or liability relating to this information or the use thereof.The use of this information is governed by the Terms of Use, available at https://www.[employer].com/en/know/clinical-effectiveness-terms. 2024© UpToDate, Inc. and its affiliates and/or licensors. All rights reserved.  Copyright   © 2024 UpToDate, Inc. and/or its affiliates. All rights reserved.

## 2025-05-23 ENCOUNTER — ANNUAL EXAM (OUTPATIENT)
Dept: OBGYN CLINIC | Facility: CLINIC | Age: 65
End: 2025-05-23

## 2025-05-23 VITALS
BODY MASS INDEX: 38.58 KG/M2 | DIASTOLIC BLOOD PRESSURE: 80 MMHG | WEIGHT: 226 LBS | HEIGHT: 64 IN | SYSTOLIC BLOOD PRESSURE: 110 MMHG

## 2025-05-23 DIAGNOSIS — Z01.419 ENCOUNTER FOR GYNECOLOGICAL EXAMINATION WITHOUT ABNORMAL FINDING: Primary | ICD-10-CM

## 2025-05-23 DIAGNOSIS — Z13.820 ENCOUNTER FOR OSTEOPOROSIS SCREENING IN ASYMPTOMATIC POSTMENOPAUSAL PATIENT: ICD-10-CM

## 2025-05-23 DIAGNOSIS — Z78.0 ENCOUNTER FOR OSTEOPOROSIS SCREENING IN ASYMPTOMATIC POSTMENOPAUSAL PATIENT: ICD-10-CM

## 2025-06-03 ENCOUNTER — OFFICE VISIT (OUTPATIENT)
Dept: CARDIOLOGY CLINIC | Facility: CLINIC | Age: 65
End: 2025-06-03
Payer: COMMERCIAL

## 2025-06-03 VITALS
HEIGHT: 64 IN | SYSTOLIC BLOOD PRESSURE: 110 MMHG | DIASTOLIC BLOOD PRESSURE: 80 MMHG | WEIGHT: 224.5 LBS | HEART RATE: 80 BPM | BODY MASS INDEX: 38.33 KG/M2

## 2025-06-03 DIAGNOSIS — E66.09 CLASS 2 OBESITY DUE TO EXCESS CALORIES WITHOUT SERIOUS COMORBIDITY WITH BODY MASS INDEX (BMI) OF 38.0 TO 38.9 IN ADULT: ICD-10-CM

## 2025-06-03 DIAGNOSIS — E66.812 CLASS 2 OBESITY DUE TO EXCESS CALORIES WITHOUT SERIOUS COMORBIDITY WITH BODY MASS INDEX (BMI) OF 38.0 TO 38.9 IN ADULT: ICD-10-CM

## 2025-06-03 DIAGNOSIS — I10 ESSENTIAL HYPERTENSION: ICD-10-CM

## 2025-06-03 DIAGNOSIS — I48.0 PAROXYSMAL ATRIAL FIBRILLATION (HCC): Primary | ICD-10-CM

## 2025-06-03 DIAGNOSIS — R00.2 PALPITATIONS: ICD-10-CM

## 2025-06-03 PROCEDURE — 99214 OFFICE O/P EST MOD 30 MIN: CPT | Performed by: INTERNAL MEDICINE

## 2025-06-03 NOTE — PROGRESS NOTES
[facility] [address] [address] [phone]                                              Cardiology Office Consult  Sarah Calhoun, 64 y.o. female  YOB: 1960  MRN: [id number] Encounter: [id number]      PCP - Jenna Knapp PA-C  Referring Provider - No ref. provider found    Chief Complaint   Patient presents with   • Follow-up     6-8 wk       Assessment  Paroxysmal Atrial Fibrillation   Event monitor (Heart care, ) - 9/2020: Occasional PACs, runs of A-fib, palpitations reported  Hypertension  Pre-diabetes  Obesity, Body mass index is 38.54 kg/m².           Plan  Assessment & Plan  Paroxysmal atrial fibrillation (HCC)  Overview  Self-reported known history of atrial fibrillation for about 3 years, but unfortunately no records available for the same -will obtain records from Dr. Garg's office   Reports that this was previously confirmed on cardiac monitor   She has been on metoprolol, Xarelto  No known prior procedures for A-fib, and all her episodes have been self resolving within a couple of hours  But over the last month she is having increased A-fib episodes on her Apple Watch, especially overnight  risk factors - increased stress at work,   caffeine use (1 ice tea, soda)  6/2025: she just sent the monitor back on 5/27/25, no results available yet  No recent Afib notifications on apple watch, seems to be doing better  Reviewed records obtained from Dr. Garg's office  Impression  Paroxysmal atrial fibrillation, mild symptoms and self resolving episodes so far  Plan  Follow-up on Zio patch results  If no overall low burden A-fib, will plan to continue current therapy  Continue metoprolol succinate 100 mg daily, Xarelto 20 mg daily  Continue with lifestyle modifications   Limit caffeine intake  Weight loss recommended  She does not want to pursue sleep study (was ordered  previously)  Palpitations  Symptoms are improving, but have been ongoing for several years  Prior event monitor in 2020 had a plantation of A-fib, and her Apple Watch has recently shown episodes of A-fib  Awaiting Zio patch monitor results  Continue metoprolol, Xarelto  Essential hypertension  Well controlled, 110/80  Continue losartan 100, metoprolol  mg daily  Class 2 obesity due to excess calories without serious comorbidity with body mass index (BMI) of 38.0 to 38.9 in adult    Body mass index is 38.54 kg/m².   A1c 5.8 --> 6.4%  Diet modifications, increase cardiac sizes, weight loss recommended      No results found for this visit on 06/03/25.      No orders of the defined types were placed in this encounter.    Return in about 6 months (around 12/3/2025), or if symptoms worsen or fail to improve.      History of Present Illness   64 y.o. female comes in as a new patient for establishment of care regarding paroxysmal atrial fibrillation.    She reports having a known history of atrial fibrillation for about 3 years, for which she used to see  previously.     Known atrial fibrillation for 3 years.   Initially had palpitations for 2 hours, and then was found to have afib on holter rmonitor.    Family history  Father - ALISON, no CAD/Afib  Mother - ?CAD (at 80), ICD implant  Siblings:   1 older sister - HTN, No known CAD    Interval history - 6/3/2025  She returns for follow-up after about 3 months.  In the interim, she remains free of chest pain, shortness of breath.  She waited to start off on the Zio patch monitor, as she was waiting for new insurance and just ended up sending it late last month.  No results available for the Zio patch monitor as yet.  She does note that palpitations are a bit better and she has not had any recent notifications on her Apple Watch for Afib.      Historical Information   Past Medical History:   Diagnosis Date   • Atrial fibrillation (HCC) 2021   • Hypertension    •  Impaired fasting glucose    • ALISON (obstructive sleep apnea)    • Pain    • Paroxysmal atrial fibrillation (HCC)      Past Surgical History:   Procedure Laterality Date   • HYSTERECTOMY  12/23/2006   • OOPHORECTOMY Right     unilateral   • OTHER SURGICAL HISTORY      Excision of lesion Genitalia Meron last assessed 11/01/16   • ME COLONOSCOPY FLX DX W/COLLJ SPEC WHEN PFRMD N/A 7/21/2016    Procedure: COLONOSCOPY;  Surgeon: Forrest Blandon MD;  Location: [facility];  Service: Gastroenterology   • TUBAL LIGATION       Family History   Problem Relation Name Age of Onset   • COPD Mother Mom    • Heart failure Mother Mom    • Hyperlipidemia Mother Mom    • Hypertension Mother Mom    • Throat cancer Mother Mom         cause of death   • COPD Father          moderate   • No Known Problems Sister     • Cancer Maternal Aunt Luisana         Urinary bladder   • No Known Problems Maternal Aunt     • No Known Problems Maternal Grandmother     • Breast cancer Paternal Grandmother Grandmother 70     Current Outpatient Medications   Medication Instructions   • fluticasone (FLONASE) 50 mcg/act nasal spray SPRAY 2 SPRAYS INTO EACH NOSTRIL EVERY DAY   • losartan (COZAAR) 100 mg, Oral, Daily   • metoprolol succinate (TOPROL-XL) 100 mg 24 hr tablet TAKE 1 TABLET BY MOUTH EVERY DAY   • METRONIDAZOLE, TOPICAL, 0.75 % LOTN Apply externally, 2 times daily   • omeprazole (PRILOSEC) 20 mg, Oral, Daily   • Xarelto 20 mg, Oral, Daily      Allergies   Allergen Reactions   • Erythromycin    • Sulfa Antibiotics    • Tetracycline      Social History     Socioeconomic History   • Marital status: /Civil Union   Occupational History   • Occupation: Neuro ass    Tobacco Use   • Smoking status: Never   • Smokeless tobacco: Never   • Tobacco comments:     Former   Vaping Use   • Vaping status: Never Used   Substance and Sexual Activity   • Alcohol use: No   • Drug use: No   • Sexual activity: Not Currently   Social History Narrative     "    No caffeine use    Seeing a dentist        Review of Systems   All other systems reviewed and are negative.        Vitals:  Vitals:    06/03/25 1457   BP: 110/80   Pulse: 80   Weight: 102 kg (224 lb 8 oz)   Height: 5' 4\" (1.626 m)     BMI - Body mass index is 38.54 kg/m².  Wt Readings from Last 7 Encounters:   06/03/25 102 kg (224 lb 8 oz)   05/23/25 103 kg (226 lb)   04/29/25 101 kg (222 lb)   03/17/25 101 kg (222 lb 6.4 oz)   11/20/24 101 kg (222 lb)   11/06/24 100 kg (221 lb)   10/28/24 97.7 kg (215 lb 6.2 oz)       Physical Exam  Vitals and nursing note reviewed.   Constitutional:       General: She is not in acute distress.     Appearance: Normal appearance. She is well-developed. She is obese. She is not ill-appearing.   HENT:      Head: Normocephalic and atraumatic.      Nose: No congestion.     Eyes:      General: No scleral icterus.     Conjunctiva/sclera: Conjunctivae normal.     Neck:      Vascular: No carotid bruit or JVD.     Cardiovascular:      Rate and Rhythm: Normal rate and regular rhythm.      Pulses: Normal pulses.      Heart sounds: Normal heart sounds. No murmur heard.     No friction rub. No gallop.   Pulmonary:      Effort: Pulmonary effort is normal. No respiratory distress.      Breath sounds: Normal breath sounds. No rales.   Abdominal:      General: There is no distension.      Palpations: Abdomen is soft.      Tenderness: There is no abdominal tenderness.     Musculoskeletal:         General: No swelling or tenderness.      Cervical back: Neck supple.      Right lower leg: No edema.      Left lower leg: No edema.     Skin:     General: Skin is warm.     Neurological:      General: No focal deficit present.      Mental Status: She is alert and oriented to person, place, and time. Mental status is at baseline.     Psychiatric:         Mood and Affect: Mood normal.         Behavior: Behavior normal.         Thought Content: Thought content normal.           Labs:  CBC:   Lab " "Results   Component Value Date    WBC 8.22 12/29/2022    RBC 5.04 12/29/2022    HGB 13.6 12/29/2022    HCT 43.0 12/29/2022    MCV 85 12/29/2022     12/29/2022    RDW 13.6 12/29/2022       CMP:   Lab Results   Component Value Date     10/26/2015    K 4.4 11/18/2024     11/18/2024    CO2 26 11/18/2024    ANIONGAP 8 10/26/2015    BUN 22 11/18/2024    CREATININE 1.06 11/18/2024    EGFR 55 11/18/2024    GLUCOSE 90 10/26/2015    CALCIUM 9.2 11/18/2024    AST 22 11/18/2024    ALT 37 11/18/2024    ALKPHOS 117 (H) 11/18/2024    PROT 7.5 10/26/2015    BILITOT 0.42 10/26/2015       Magnesium:  No results found for: \"MG\"    Lipid Profile:   Lab Results   Component Value Date    CHOL 156 10/26/2015    HDL 46 (L) 11/18/2024    TRIG 109 11/18/2024    LDLCALC 107 (H) 11/18/2024       Thyroid Studies:   Lab Results   Component Value Date    CJZ4HVLNBFCA 2.080 11/18/2024    FREET4 1.2 03/13/2015       A1c:  No components found for: \"HGA1C\"    INR:  No results found for: \"INR\"5    Cardiac testing:   No results found for this or any previous visit.    No results found for this or any previous visit.    No results found for this or any previous visit.    No results found for this or any previous visit.      Echo complete w/ contrast if indicated  •  Left Ventricle: Left ventricular cavity size is normal. Wall thickness   is normal. The left ventricular ejection fraction is 60%. Systolic   function is normal. Wall motion is normal. Diastolic function is normal.  •  Right Ventricle: Right ventricular cavity size is normal. Systolic   function is normal.  •  Mitral Valve: There is mild annular calcification. There is trace   regurgitation.  •  Tricuspid Valve: There is trace regurgitation. There is no indirect   evidence of pulmonary hypertension.  •  IVC/SVC: The right atrial pressure is estimated at 3.0 mmHg. The   inferior vena cava is normal in size. Respirophasic changes were normal.         "

## 2025-06-03 NOTE — ASSESSMENT & PLAN NOTE
Overview  Self-reported known history of atrial fibrillation for about 3 years, but unfortunately no records available for the same -will obtain records from Dr. Garg's office   Reports that this was previously confirmed on cardiac monitor   She has been on metoprolol, Xarelto  No known prior procedures for A-fib, and all her episodes have been self resolving within a couple of hours  But over the last month she is having increased A-fib episodes on her Apple Watch, especially overnight  risk factors - increased stress at work,   caffeine use (1 ice tea, soda)  6/2025: she just sent the monitor back on 5/27/25, no results available yet  No recent Afib notifications on apple watch, seems to be doing better  Reviewed records obtained from Dr. Garg's office  Impression  Paroxysmal atrial fibrillation, mild symptoms and self resolving episodes so far  Plan  Follow-up on Zio patch results  If no overall low burden A-fib, will plan to continue current therapy  Continue metoprolol succinate 100 mg daily, Xarelto 20 mg daily  Continue with lifestyle modifications   Limit caffeine intake  Weight loss recommended  She does not want to pursue sleep study (was ordered previously)

## 2025-06-04 ENCOUNTER — RESULTS FOLLOW-UP (OUTPATIENT)
Dept: CARDIOLOGY CLINIC | Facility: CLINIC | Age: 65
End: 2025-06-04

## 2025-06-05 ENCOUNTER — TELEPHONE (OUTPATIENT)
Dept: CARDIOLOGY CLINIC | Facility: CLINIC | Age: 65
End: 2025-06-05

## 2025-06-05 DIAGNOSIS — I48.91 ATRIAL FIBRILLATION, UNSPECIFIED TYPE (HCC): Primary | ICD-10-CM

## 2025-06-05 RX ORDER — FLECAINIDE ACETATE 100 MG/1
100 TABLET ORAL 2 TIMES DAILY
Qty: 60 TABLET | Refills: 2 | Status: SHIPPED | OUTPATIENT
Start: 2025-06-05 | End: 2025-06-14

## 2025-06-05 NOTE — TELEPHONE ENCOUNTER
Spoke to patient regarding Dr. Brooks's advise. Patient will start medication and wants sent to [facility] will call us back with any concerns

## 2025-06-05 NOTE — TELEPHONE ENCOUNTER
Your heart monitor showed multiple episodes of atrial fibrillation, 1 of which lasted for 2 hours and 43 minutes during sleep.  The 1 symptomatic episode that you reported during the monitoring, also correlated with atrial fibrillation.  Since you are having multiple episodes of atrial fibrillation, at least some of which are symptomatic, you could benefit from additional medication to help reduce the likelihood of A-fib recurrence.  I recommend starting on flecainide 100 mg twice a day to help with this, especially if you are continuing to have symptoms.  If you do have a follow-up ECG in the office via nurse visit 1 week after starting the medication.  Additionally we need to monitor you for symptoms of blockages like chest pain and shortness of breath and get periodic stress testing for you as well.  If you would like to start this medication to help with A-fib, then I can send a prescription for it now.  Else, we can meet for an earlier follow-up visit in a few months and decide at that time.   ---------------------------------------  Called and spoke to patient regarding results above for Zio monitor. Patient is concerned of side effects and would like to know if she starts the medication, will she be able to stop it abruptly if she would start having side effects. Patient would like more information. (Offered 3 open appointments in various offices in the next week that are available- patient denied says she can't manage any of them/ any location)

## 2025-06-12 NOTE — TELEPHONE ENCOUNTER
Patient isn't home today so doesn't want to come in for an EKG and BP check today. She is already scheduled tomorrow for it.  She will increase meds as recommended.   She said she is nervous when she sees her afib

## 2025-06-13 ENCOUNTER — CLINICAL SUPPORT (OUTPATIENT)
Dept: CARDIOLOGY CLINIC | Facility: CLINIC | Age: 65
End: 2025-06-13
Payer: COMMERCIAL

## 2025-06-13 ENCOUNTER — HOSPITAL ENCOUNTER (OUTPATIENT)
Facility: HOSPITAL | Age: 65
Setting detail: OBSERVATION
Discharge: HOME | End: 2025-06-14
Attending: EMERGENCY MEDICINE | Admitting: HOSPITALIST
Payer: COMMERCIAL

## 2025-06-13 ENCOUNTER — TELEPHONE (OUTPATIENT)
Dept: CARDIOLOGY CLINIC | Facility: CLINIC | Age: 65
End: 2025-06-13

## 2025-06-13 VITALS
OXYGEN SATURATION: 95 % | DIASTOLIC BLOOD PRESSURE: 80 MMHG | SYSTOLIC BLOOD PRESSURE: 118 MMHG | HEART RATE: 113 BPM | WEIGHT: 224 LBS | BODY MASS INDEX: 38.45 KG/M2

## 2025-06-13 DIAGNOSIS — I48.91 ATRIAL FIBRILLATION WITH RVR (HCC): Primary | ICD-10-CM

## 2025-06-13 DIAGNOSIS — I45.10 RBBB (RIGHT BUNDLE BRANCH BLOCK): ICD-10-CM

## 2025-06-13 DIAGNOSIS — R06.09 DYSPNEA ON EXERTION: ICD-10-CM

## 2025-06-13 DIAGNOSIS — R60.9 PITTING EDEMA: ICD-10-CM

## 2025-06-13 DIAGNOSIS — I48.92 ATRIAL FLUTTER, UNSPECIFIED TYPE (HCC): ICD-10-CM

## 2025-06-13 DIAGNOSIS — Z01.89 ENCOUNTER FOR CARDIOVERSION PROCEDURE: ICD-10-CM

## 2025-06-13 DIAGNOSIS — I48.91 ATRIAL FIBRILLATION, UNSPECIFIED TYPE (HCC): Primary | ICD-10-CM

## 2025-06-13 PROBLEM — I48.0 PAROXYSMAL ATRIAL FIBRILLATION (HCC): Status: ACTIVE | Noted: 2021-10-04

## 2025-06-13 LAB
ALBUMIN SERPL BCG-MCNC: 4.2 G/DL (ref 3.5–5)
ALP SERPL-CCNC: 94 U/L (ref 34–104)
ALT SERPL W P-5'-P-CCNC: 33 U/L (ref 7–52)
ANION GAP SERPL CALCULATED.3IONS-SCNC: 5 MMOL/L (ref 4–13)
AST SERPL W P-5'-P-CCNC: 23 U/L (ref 13–39)
ATRIAL RATE: 112 BPM
ATRIAL RATE: 67 BPM
BASOPHILS # BLD AUTO: 0.08 THOUSANDS/ÂΜL (ref 0–0.1)
BASOPHILS NFR BLD AUTO: 1 % (ref 0–1)
BILIRUB SERPL-MCNC: 0.47 MG/DL (ref 0.2–1)
BNP SERPL-MCNC: 273 PG/ML (ref 0–100)
BUN SERPL-MCNC: 24 MG/DL (ref 5–25)
CALCIUM SERPL-MCNC: 9.3 MG/DL (ref 8.4–10.2)
CARDIAC TROPONIN I PNL SERPL HS: <2 NG/L (ref ?–50)
CARDIAC TROPONIN I PNL SERPL HS: <2 NG/L (ref ?–50)
CARDIAC TROPONIN I PNL SERPL HS: <2.3 NG/L (ref ?–50)
CHLORIDE SERPL-SCNC: 107 MMOL/L (ref 96–108)
CO2 SERPL-SCNC: 25 MMOL/L (ref 21–32)
CREAT SERPL-MCNC: 1.12 MG/DL (ref 0.6–1.3)
EOSINOPHIL # BLD AUTO: 0.29 THOUSAND/ÂΜL (ref 0–0.61)
EOSINOPHIL NFR BLD AUTO: 3 % (ref 0–6)
ERYTHROCYTE [DISTWIDTH] IN BLOOD BY AUTOMATED COUNT: 13.8 % (ref 11.6–15.1)
GFR SERPL CREATININE-BSD FRML MDRD: 51 ML/MIN/1.73SQ M
GLUCOSE SERPL-MCNC: 99 MG/DL (ref 65–140)
HCT VFR BLD AUTO: 47.6 % (ref 34.8–46.1)
HGB BLD-MCNC: 15.3 G/DL (ref 11.5–15.4)
IMM GRANULOCYTES # BLD AUTO: 0.1 THOUSAND/UL (ref 0–0.2)
IMM GRANULOCYTES NFR BLD AUTO: 1 % (ref 0–2)
LYMPHOCYTES # BLD AUTO: 3.96 THOUSANDS/ÂΜL (ref 0.6–4.47)
LYMPHOCYTES NFR BLD AUTO: 36 % (ref 14–44)
MAGNESIUM SERPL-MCNC: 2.2 MG/DL (ref 1.9–2.7)
MCH RBC QN AUTO: 27.5 PG (ref 26.8–34.3)
MCHC RBC AUTO-ENTMCNC: 32.1 G/DL (ref 31.4–37.4)
MCV RBC AUTO: 86 FL (ref 82–98)
MONOCYTES # BLD AUTO: 0.98 THOUSAND/ÂΜL (ref 0.17–1.22)
MONOCYTES NFR BLD AUTO: 9 % (ref 4–12)
NEUTROPHILS # BLD AUTO: 5.49 THOUSANDS/ÂΜL (ref 1.85–7.62)
NEUTS SEG NFR BLD AUTO: 50 % (ref 43–75)
NRBC BLD AUTO-RTO: 0 /100 WBCS
P AXIS: 100 DEGREES
P AXIS: 60 DEGREES
PLATELET # BLD AUTO: 307 THOUSANDS/UL (ref 149–390)
PMV BLD AUTO: 10.3 FL (ref 8.9–12.7)
POTASSIUM SERPL-SCNC: 4.5 MMOL/L (ref 3.5–5.3)
PR INTERVAL: 140 MS
PR INTERVAL: 206 MS
PROT SERPL-MCNC: 7.7 G/DL (ref 6.4–8.4)
QRS AXIS: 123 DEGREES
QRS AXIS: 163 DEGREES
QRSD INTERVAL: 122 MS
QRSD INTERVAL: 142 MS
QT INTERVAL: 418 MS
QT INTERVAL: 432 MS
QTC INTERVAL: 441 MS
QTC INTERVAL: 589 MS
RBC # BLD AUTO: 5.56 MILLION/UL (ref 3.81–5.12)
SODIUM SERPL-SCNC: 137 MMOL/L (ref 135–147)
T WAVE AXIS: -16 DEGREES
T WAVE AXIS: 52 DEGREES
TSH SERPL DL<=0.05 MIU/L-ACNC: 1.45 UIU/ML (ref 0.45–4.5)
VENTRICULAR RATE: 112 BPM
VENTRICULAR RATE: 67 BPM
WBC # BLD AUTO: 10.9 THOUSAND/UL (ref 4.31–10.16)

## 2025-06-13 PROCEDURE — 84443 ASSAY THYROID STIM HORMONE: CPT | Performed by: NURSE PRACTITIONER

## 2025-06-13 PROCEDURE — 36415 COLL VENOUS BLD VENIPUNCTURE: CPT

## 2025-06-13 PROCEDURE — 99211 OFF/OP EST MAY X REQ PHY/QHP: CPT

## 2025-06-13 PROCEDURE — 80053 COMPREHEN METABOLIC PANEL: CPT

## 2025-06-13 PROCEDURE — 99284 EMERGENCY DEPT VISIT MOD MDM: CPT

## 2025-06-13 PROCEDURE — RECHECK

## 2025-06-13 PROCEDURE — 84484 ASSAY OF TROPONIN QUANT: CPT | Performed by: EMERGENCY MEDICINE

## 2025-06-13 PROCEDURE — 83735 ASSAY OF MAGNESIUM: CPT

## 2025-06-13 PROCEDURE — 93005 ELECTROCARDIOGRAM TRACING: CPT

## 2025-06-13 PROCEDURE — 93010 ELECTROCARDIOGRAM REPORT: CPT | Performed by: INTERNAL MEDICINE

## 2025-06-13 PROCEDURE — 92960 CARDIOVERSION ELECTRIC EXT: CPT | Performed by: EMERGENCY MEDICINE

## 2025-06-13 PROCEDURE — 83880 ASSAY OF NATRIURETIC PEPTIDE: CPT | Performed by: EMERGENCY MEDICINE

## 2025-06-13 PROCEDURE — 85025 COMPLETE CBC W/AUTO DIFF WBC: CPT

## 2025-06-13 PROCEDURE — 99214 OFFICE O/P EST MOD 30 MIN: CPT | Performed by: INTERNAL MEDICINE

## 2025-06-13 PROCEDURE — 92960 CARDIOVERSION ELECTRIC EXT: CPT

## 2025-06-13 PROCEDURE — 99222 1ST HOSP IP/OBS MODERATE 55: CPT | Performed by: HOSPITALIST

## 2025-06-13 PROCEDURE — 84484 ASSAY OF TROPONIN QUANT: CPT | Performed by: HOSPITALIST

## 2025-06-13 PROCEDURE — 99285 EMERGENCY DEPT VISIT HI MDM: CPT | Performed by: EMERGENCY MEDICINE

## 2025-06-13 RX ORDER — FENTANYL CITRATE 50 UG/ML
50 INJECTION, SOLUTION INTRAMUSCULAR; INTRAVENOUS ONCE
Refills: 0 | Status: COMPLETED | OUTPATIENT
Start: 2025-06-13 | End: 2025-06-13

## 2025-06-13 RX ORDER — MIDAZOLAM HYDROCHLORIDE 2 MG/2ML
2 INJECTION, SOLUTION INTRAMUSCULAR; INTRAVENOUS ONCE
Status: COMPLETED | OUTPATIENT
Start: 2025-06-13 | End: 2025-06-13

## 2025-06-13 RX ORDER — LOSARTAN POTASSIUM 50 MG/1
100 TABLET ORAL DAILY
Status: DISCONTINUED | OUTPATIENT
Start: 2025-06-14 | End: 2025-06-14 | Stop reason: HOSPADM

## 2025-06-13 RX ORDER — AMIODARONE HYDROCHLORIDE 200 MG/1
200 TABLET ORAL
Status: DISCONTINUED | OUTPATIENT
Start: 2025-06-28 | End: 2025-06-14 | Stop reason: HOSPADM

## 2025-06-13 RX ORDER — AMIODARONE HYDROCHLORIDE 200 MG/1
200 TABLET ORAL 2 TIMES DAILY WITH MEALS
Status: DISCONTINUED | OUTPATIENT
Start: 2025-06-20 | End: 2025-06-14 | Stop reason: HOSPADM

## 2025-06-13 RX ORDER — PANTOPRAZOLE SODIUM 40 MG/1
40 TABLET, DELAYED RELEASE ORAL
Status: DISCONTINUED | OUTPATIENT
Start: 2025-06-14 | End: 2025-06-14 | Stop reason: HOSPADM

## 2025-06-13 RX ORDER — METOPROLOL SUCCINATE 100 MG/1
100 TABLET, EXTENDED RELEASE ORAL DAILY
Status: DISCONTINUED | OUTPATIENT
Start: 2025-06-14 | End: 2025-06-14 | Stop reason: HOSPADM

## 2025-06-13 RX ORDER — AMIODARONE HYDROCHLORIDE 200 MG/1
200 TABLET ORAL
Status: DISCONTINUED | OUTPATIENT
Start: 2025-06-13 | End: 2025-06-14 | Stop reason: HOSPADM

## 2025-06-13 RX ADMIN — SODIUM CHLORIDE 250 ML: 0.9 INJECTION, SOLUTION INTRAVENOUS at 14:09

## 2025-06-13 RX ADMIN — MIDAZOLAM HYDROCHLORIDE 2 MG: 1 INJECTION, SOLUTION INTRAMUSCULAR; INTRAVENOUS at 13:44

## 2025-06-13 RX ADMIN — FENTANYL CITRATE 50 MCG: 50 INJECTION INTRAMUSCULAR; INTRAVENOUS at 13:44

## 2025-06-13 RX ADMIN — AMIODARONE HYDROCHLORIDE 200 MG: 200 TABLET ORAL at 15:55

## 2025-06-13 NOTE — PROGRESS NOTES
Pt presents for a nurse visit with EKG per Dr Brooks.     EKG interpreted by Dr Metz. Suspected flecainide toxicity. New ST elevation.    Dr Brooks interpreted EKG. Recommendations go to Emergency Room to be evaluated. PT verbalized understanding.

## 2025-06-13 NOTE — ASSESSMENT & PLAN NOTE
Some GILES moderate exertion for 2 years  Per cardio no evidence of volume overload  Recommend OP stress test & OP sleep study - consider AMB referral for sleep study upon DC

## 2025-06-13 NOTE — ASSESSMENT & PLAN NOTE
Snoring, daytime sleepiness. Does not want to have sleep study since she will not wear a mask/CPAP

## 2025-06-13 NOTE — ASSESSMENT & PLAN NOTE
Presented in rapid atrial flutter now s/p cardioversion  PTA: flecainide 100 mg PO BID and Toprol  mg daily  OAC: Xarelto 20 mg daily

## 2025-06-13 NOTE — ASSESSMENT & PLAN NOTE
Has h/o paro afib, recently had increased burden on ziopatch with some 1 episode being symptomatic  hence was started on flecainide a week ago, initially felt well for 4 days then started feeling something in chest with her apple watch showing afib  Today she went to cardio office for post fleicanide EKG which showed A flutter with RVR HR 110s hence she was sent to ED for DCCV as she has been on OAC consistently  In ED HR 110s, s/p sedation & DCCV now in NSR  Seen by IP cardio team - plan is to monitor her overnight on tele & if asymptomatic then DC home tomorrow  They also rec to stop flecainide & start on PO amiodarone with loading 200 TID x 1 wk then 200 BID x 1 wk then 200 mg daily with goal to be on it only for short term  With plan for OP EP f/u for consideration for ablation  Cont home toprol  mg QD & xarelto daily

## 2025-06-13 NOTE — PLAN OF CARE
Problem: PAIN - ADULT  Goal: Verbalizes/displays adequate comfort level or baseline comfort level  Description: Interventions:  - Encourage patient to monitor pain and request assistance  - Assess pain using appropriate pain scale  - Administer analgesics as ordered based on type and severity of pain and evaluate response  - Implement non-pharmacological measures as appropriate and evaluate response  - Consider cultural and social influences on pain and pain management  - Notify physician/advanced practitioner if interventions unsuccessful or patient reports new pain  - Educate patient/family on pain management process including their role and importance of  reporting pain   - Provide non-pharmacologic/complimentary pain relief interventions  Outcome: Progressing     Problem: INFECTION - ADULT  Goal: Absence or prevention of progression during hospitalization  Description: INTERVENTIONS:  - Assess and monitor for signs and symptoms of infection  - Monitor lab/diagnostic results  - Monitor all insertion sites, i.e. indwelling lines, tubes, and drains  - Monitor endotracheal if appropriate and nasal secretions for changes in amount and color  - [place] appropriate cooling/warming therapies per order  - Administer medications as ordered  - Instruct and encourage patient and family to use good hand hygiene technique  - Identify and instruct in appropriate isolation precautions for identified infection/condition  Outcome: Progressing  Goal: Absence of fever/infection during neutropenic period  Description: INTERVENTIONS:  - Monitor WBC  - Perform strict hand hygiene  - Limit to healthy visitors only  - No plants, dried, fresh or silk flowers with torrez in patient room  Outcome: Progressing     Problem: SAFETY ADULT  Goal: Patient will remain free of falls  Description: INTERVENTIONS:  - Educate patient/family on patient safety including physical limitations  - Instruct patient to call for assistance with activity   -  Consider consulting OT/PT to assist with strengthening/mobility based on AM PAC & JH-HLM score  - Consult OT/PT to assist with strengthening/mobility   - Keep Call bell within reach  - Keep bed low and locked with side rails adjusted as appropriate  - Keep care items and personal belongings within reach  - Initiate and maintain comfort rounds  - Make Fall Risk Sign visible to staff  - Offer Toileting every  Hours, in advance of need  - Initiate/Maintain alarm  - Obtain necessary fall risk management equipment:  - Apply yellow socks and bracelet for high fall risk patients  - Consider moving patient to room near nurses station  Outcome: Progressing  Goal: Maintain or return to baseline ADL function  Description: INTERVENTIONS:  -  Assess patient's ability to carry out ADLs; assess patient's baseline for ADL function and identify physical deficits which impact ability to perform ADLs (bathing, care of mouth/teeth, toileting, grooming, dressing, etc.)  - Assess/evaluate cause of self-care deficits   - Assess range of motion  - Assess patient's mobility; develop plan if impaired  - Assess patient's need for assistive devices and provide as appropriate  - Encourage maximum independence but intervene and supervise when necessary  - Involve family in performance of ADLs  - Assess for home care needs following discharge   - Consider OT consult to assist with ADL evaluation and planning for discharge  - Provide patient education as appropriate  - Monitor functional capacity and physical performance, use of AM PAC & JH-HLM   - Monitor gait, balance and fatigue with ambulation    Outcome: Progressing  Goal: Maintains/Returns to pre admission functional level  Description: INTERVENTIONS:  - Perform AM-PAC 6 Click Basic Mobility/ Daily Activity assessment daily.  - Set and communicate daily mobility goal to care team and patient/family/caregiver.   - Collaborate with rehabilitation services on mobility goals if consulted  -  Perform Range of Motion  times a day.  - Reposition patient every  hours.  - Dangle patient  times a day  - Stand patient  times a day  - Ambulate patient  times a day  - Out of bed to chair  times a day   - Out of bed for meals times a day  - Out of bed for toileting  - Record patient progress and toleration of activity level   Outcome: Progressing     Problem: DISCHARGE PLANNING  Goal: Discharge to home or other facility with appropriate resources  Description: INTERVENTIONS:  - Identify barriers to discharge w/patient and caregiver  - Arrange for needed discharge resources and transportation as appropriate  - Identify discharge learning needs (meds, wound care, etc.)  - Arrange for interpretive services to assist at discharge as needed  - Refer to Case Management Department for coordinating discharge planning if the patient needs post-hospital services based on physician/advanced practitioner order or complex needs related to functional status, cognitive ability, or social support system  Outcome: Progressing     Problem: Knowledge Deficit  Goal: Patient/family/caregiver demonstrates understanding of disease process, treatment plan, medications, and discharge instructions  Description: Complete learning assessment and assess knowledge base.  Interventions:  - Provide teaching at level of understanding  - Provide teaching via preferred learning methods  Outcome: Progressing

## 2025-06-13 NOTE — H&P
H&P - Hospitalist   Name: Sarah Calhoun 64 y.o. female I MRN: [id number]  Unit/Bed#: S -01 I Date of Admission: 6/13/2025   Date of Service: 6/13/2025 I Hospital Day: 0     Assessment & Plan  Atrial flutter (HCC)  Has h/o paro afib, recently had increased burden on ziopatch with some 1 episode being symptomatic  hence was started on flecainide a week ago, initially felt well for 4 days then started feeling something in chest with her apple watch showing afib  Today she went to cardio office for post fleicanide EKG which showed A flutter with RVR HR 110s hence she was sent to ED for DCCV as she has been on OAC consistently  In ED HR 110s, s/p sedation & DCCV now in NSR  Seen by IP cardio team - plan is to monitor her overnight on tele & if asymptomatic then DC home tomorrow  They also rec to stop flecainide & start on PO amiodarone with loading 200 TID x 1 wk then 200 BID x 1 wk then 200 mg daily with goal to be on it only for short term  With plan for OP EP f/u for consideration for ablation  Cont home toprol  mg QD & xarelto daily  Dyspnea on exertion  Some GILES moderate exertion for 2 years  Per cardio no evidence of volume overload  Recommend OP stress test & OP sleep study - consider AMB referral for sleep study upon DC  Paroxysmal atrial fibrillation (HCC)  H/o same for some years  Has stopped drinking alcohol, no smoking  Now interested in sleep study  Morbid obesity (HCC)    Impaired fasting glucose    Essential hypertension  BP low today post DCCV  Cont home toprol xl 100 mg & losartan 100 mg QD from tomorrow  Suspected ALISON        VTE Pharmacologic Prophylaxis:   Moderate Risk (Score 3-4) - Pharmacological DVT Prophylaxis Ordered: rivaroxaban (Xarelto).  Code Status: Level 1 - Full Code   Discussion with family: patient.     Anticipated Length of Stay: Patient will be admitted on an observation basis with an anticipated length of stay of less than 2 midnights secondary to monitor  rhythm.    History of Present Illness   Chief Complaint: sent from cardiology office due to aflutter     Sarah Calhoun is a 64 y.o. female with a PMH of paro Afib, HTN, IFG who presents from cardiology office due to findings of atrial flutter.  Patient does have history of paroxysmal atrial fibrillation for few years in the past, was having off-and-on episodes lasting for hours and psych resolving.  Recently had a Zio patch monitor as outpatient which noted increased burden in was noted to have 1 symptomatic episode correlating with her A-fib RVR.  Hence she was started on flecainide last week.  She states that the initial 4 days she felt very good on that but day before yesterday she started feeling little unusual and was noted to be in A-fib by her Apple Watch and she herself was feeling some racing in her chest.  When she came for her follow-up EKG appointment today, in her EKG she was noted to be in atrial flutter with RBBB with heart rate in 110s to 120s and she was sent to ED for consideration of cardioversion.  In the emergency room send she underwent sedation with DC cardioversion, she has been taking her Xarelto consistently.  She is asymptomatic now, normal sinus rhythm.  She was evaluated by inpatient cardiology team here who recommended that she discontinue flecainide, start on oral amiodarone short-term with plans for her to be evaluated by EP as outpatient for consideration of ablation.  They wanted her to be monitored overnight on telemetry to watch her rhythm and if everything stable be discharged home tomorrow.  She denies any current smoking, quit 40 years ago, no alcohol use because that used to trigger her A-fib in the past    Review of Systems   Constitutional:  Negative for activity change, appetite change, chills and fever.   Respiratory:  Negative for cough, shortness of breath and wheezing.    Cardiovascular:  Positive for palpitations. Negative for chest pain and leg swelling.    Gastrointestinal:  Negative for abdominal pain, diarrhea, nausea and vomiting.   Genitourinary:  Negative for difficulty urinating and dysuria.   Neurological:  Negative for dizziness and light-headedness.   All other systems reviewed and are negative.      Historical Information   Past Medical History[1]  Past Surgical History[2]  Social History[3]  E-Cigarette/Vaping    E-Cigarette Use Never User      E-Cigarette/Vaping Substances    Nicotine No     THC No     CBD No     Flavoring No     Other No     Unknown No      Family History[4]  Social History:  Marital Status: /Civil Union   Occupation:   Patient Pre-hospital Living Situation: Home  Patient Pre-hospital Level of Mobility: walks  Patient Pre-hospital Diet Restrictions:     Meds/Allergies   I have reviewed home medications with patient personally.  Prior to Admission medications    Medication Sig Start Date End Date Taking? Authorizing Provider   flecainide (TAMBOCOR) 100 mg tablet Take 1 tablet (100 mg total) by mouth 2 (two) times a day 6/5/25   Flaco Brooks MD   fluticasone (FLONASE) 50 mcg/act nasal spray SPRAY 2 SPRAYS INTO EACH NOSTRIL EVERY DAY 12/12/20   Jenna Knapp PA-C   losartan (COZAAR) 100 MG tablet TAKE 1 TABLET BY MOUTH EVERY DAY 2/4/25   Kenyon Wiley MD   metoprolol succinate (TOPROL-XL) 100 mg 24 hr tablet TAKE 1 TABLET BY MOUTH EVERY DAY 3/4/21   Jenna Knapp PA-C   METRONIDAZOLE, TOPICAL, 0.75 % LOTN Apply topically 2 (two) times a day 11/20/24   Aviva Monroe DO   omeprazole (PriLOSEC) 20 mg delayed release capsule TAKE 1 CAPSULE BY MOUTH EVERY DAY 4/4/25   Martha Alcala PA-C   Xarelto 20 MG tablet Take 1 tablet (20 mg total) by mouth daily 10/18/24   Kelsie Sanz PA-C     Allergies   Allergen Reactions    Erythromycin     Sulfa Antibiotics     Tetracycline        Objective :  Temp:  [97.3 °F (36.3 °C)-98.4 °F (36.9 °C)] 97.3 °F (36.3 °C)  HR:  [] 73  BP: ()/(50-89)  116/72  Resp:  [16-18] 18  SpO2:  [92 %-98 %] 96 %  O2 Device: None (Room air)  Nasal Cannula O2 Flow Rate (L/min):  [2 L/min] 2 L/min    Physical Exam  Vitals reviewed.   HENT:      Head: Normocephalic and atraumatic.     Cardiovascular:      Rate and Rhythm: Normal rate and regular rhythm.   Pulmonary:      Effort: Pulmonary effort is normal. No respiratory distress.      Breath sounds: Normal breath sounds.   Abdominal:      General: There is no distension.      Palpations: Abdomen is soft.      Tenderness: There is no abdominal tenderness.     Musculoskeletal:      Right lower leg: No edema.      Left lower leg: No edema.     Neurological:      Mental Status: She is alert and oriented to person, place, and time.          Lines/Drains:            Lab Results: I have reviewed the following results:  Results from last 7 days   Lab Units 06/13/25  1201   WBC Thousand/uL 10.90*   HEMOGLOBIN g/dL 15.3   HEMATOCRIT % 47.6*   PLATELETS Thousands/uL 307   SEGS PCT % 50   LYMPHO PCT % 36   MONO PCT % 9   EOS PCT % 3     Results from last 7 days   Lab Units 06/13/25  1201   SODIUM mmol/L 137   POTASSIUM mmol/L 4.5   CHLORIDE mmol/L 107   CO2 mmol/L 25   BUN mg/dL 24   CREATININE mg/dL 1.12   ANION GAP mmol/L 5   CALCIUM mg/dL 9.3   ALBUMIN g/dL 4.2   TOTAL BILIRUBIN mg/dL 0.47   ALK PHOS U/L 94   ALT U/L 33   AST U/L 23   GLUCOSE RANDOM mg/dL 99             Lab Results   Component Value Date    HGBA1C 6.4 (H) 11/18/2024    HGBA1C 5.8 (H) 12/29/2022    HGBA1C 5.8 (H) 07/14/2021           Imaging Results Review: No pertinent imaging studies reviewed.  Other Study Results Review: EKG was reviewed.     Administrative Statements   I have spent a total time of 65 minutes in caring for this patient on the day of the visit/encounter including Diagnostic results, Patient and family education, Counseling / Coordination of care, Documenting in the medical record, Reviewing/placing orders in the medical record (including tests,  medications, and/or procedures), Obtaining or reviewing history  , and Communicating with other healthcare professionals .    ** Please Note: This note has been constructed using a voice recognition system. **         [1]   Past Medical History:  Diagnosis Date    Atrial fibrillation (HCC) 2021    Hypertension     Impaired fasting glucose     Pain     Paroxysmal atrial fibrillation (HCC)     Suspected ALISON    [2]   Past Surgical History:  Procedure Laterality Date    HYSTERECTOMY  12/23/2006    OOPHORECTOMY Right     unilateral    OTHER SURGICAL HISTORY      Excision of lesion Genitalia Bengin last assessed 11/01/16    MA COLONOSCOPY FLX DX W/COLLJ SPEC WHEN PFRMD N/A 7/21/2016    Procedure: COLONOSCOPY;  Surgeon: Forrest Blandon MD;  Location: [facility];  Service: Gastroenterology    TUBAL LIGATION     [3]   Social History  Tobacco Use    Smoking status: Never    Smokeless tobacco: Never    Tobacco comments:     Former   Vaping Use    Vaping status: Never Used   Substance and Sexual Activity    Alcohol use: No    Drug use: No    Sexual activity: Not Currently   [4]   Family History  Problem Relation Name Age of Onset    COPD Mother Mom     Heart failure Mother Mom     Hyperlipidemia Mother Mom     Hypertension Mother Mom     Throat cancer Mother Mom         cause of death    COPD Father          moderate    No Known Problems Sister      Cancer Maternal Aunt Weatherly         Urinary bladder    No Known Problems Maternal Aunt      No Known Problems Maternal Grandmother      Breast cancer Paternal Grandmother Grandmother 70

## 2025-06-13 NOTE — CONSULTS
Consultation - Cardiology Team One  Sarah Calhoun 64 y.o. female MRN: [id number]  Unit/Bed#: ED-19 Encounter: [id number]    Consult to cardiology  Consult performed by: MATTI Blanton  Consult ordered by: Hardy Dominguez MD      Physician Requesting Consult: Hardy Dominguez MD  Reason for Consult / Principal Problem: Atrial flutter, dyspnea    Assessment & Plan  Atrial flutter (HCC)  Presented to office for ECG check after initiation of flecainide last week  2-3 days of palpitations  ECG showed Atrial flutter with RVR and RBBB.   Sent to ED   Now s/p cardioversion in the ED, in NSR with heart rate in the 70s  Anticoagulated on Xarelto  Taking Toprol  mg daily  Recent echo with preserved LV function  Paroxysmal atrial fibrillation (HCC)  Presented in rapid atrial flutter now s/p cardioversion  PTA: flecainide 100 mg PO BID and Toprol  mg daily  OAC: Xarelto 20 mg daily  Essential hypertension  Stable, last /79  Home Rx: losartan 100 mg daily, Toprol  mg daily  Impaired fasting glucose  A1c 6.4%  Morbid obesity (HCC)  BMI 37.86  Suspected ALISON  Snoring, daytime sleepiness. Does not want to have sleep study since she will not wear a mask/CPAP    Plan/Recommendations  S/p successful cardioversion in the ED  Monitor overnight on telemetry  D/c flecainide  Continue Toprol  mg daily  Add oral amiodarone until she can see EP as an outpatient  Continue Xarelto 20 mg daily  No signs/symptoms of acute CHF   Dyspnea on exertion x 2 years. Outpatient stress testing    History of Present Illness   HPI: Sarah Calhoun is a 64 y.o. year old female with paroxysmal atrial fibrillation, HTN, suspected ALISON, and prediabetes who recently established with cardiologist Dr. Brooks. 14 day Zio monitor worn in May showed multiple episodes of atrial fibrillation with RVR. Symptoms of chest pain, shortness of breath, and jaw pain corresponded with afib RVR. Flecainide was started on  6/5/25. She presented to the office for routine ECG check today which demonstrated rapid atrial flutter with new RBBB. She was sent to the ED for further management with cardioversion. She has been compliant with Xarelto.    She lives at home with her spouse and is independent with all ADLs. She works as a . She drinks an iced tea and a can of coke daily, but is trying to cut back on the soda. She denies any tobacco, alcohol, or drug use. She has had GILES for 2 years. She denies any recent weight gain, leg swelling, or abdominal bloating. She does not sleep well at night but denies orthopnea. She gets occasional episodes of jaw and chest discomfort/aching. She has no symptoms at rest and currently just feels anxious about cardioversion procedure.    EKG reviewed personally: Atrial flutter with RVR, . RBBB    Telemetry reviewed personally: Cardioverted at 1350, now NSR     Review of Systems   Constitutional: Negative for chills, malaise/fatigue and weight gain.   Cardiovascular:  Positive for dyspnea on exertion. Negative for chest pain, leg swelling, orthopnea, palpitations and syncope.   Respiratory:  Positive for snoring. Negative for cough, shortness of breath, sleep disturbances due to breathing and sputum production.    Gastrointestinal:  Negative for bloating and nausea.   Genitourinary: Negative.    Neurological:  Negative for dizziness, light-headedness and weakness.   Psychiatric/Behavioral:  Negative for altered mental status.    All other systems reviewed and are negative.    Historical Information   Past Medical History[1]  Past Surgical History[2]  Social History     Substance and Sexual Activity   Alcohol Use No     Social History     Substance and Sexual Activity   Drug Use No     Tobacco Use History[3]  Family History: Family history non-contributory    Meds/Allergies   all current active meds have been reviewed and current meds:   Current Facility-Administered Medications:      "sodium chloride 0.9 % bolus 250 mL, Once  No current facility-administered medications for this encounter.      Allergies[4]    Objective   Vitals: Blood pressure 122/89, pulse (!) 109, temperature 98.4 °F (36.9 °C), temperature source Oral, resp. rate 18, height 5' 4.5\" (1.638 m), weight 102 kg (224 lb), SpO2 96%., Body mass index is 37.86 kg/m².,     Systolic (24hrs), Av , Min:111 , Max:122     Diastolic (24hrs), Av, Min:61, Max:89      No intake or output data in the 24 hours ending 25 1345  Wt Readings from Last 3 Encounters:   25 102 kg (224 lb)   25 102 kg (224 lb)   25 102 kg (224 lb 8 oz)     Invasive Devices       Peripheral Intravenous Line  Duration             Peripheral IV 25 Right Antecubital <1 day                    Physical Exam  Vitals reviewed.   Constitutional:       General: She is not in acute distress.     Appearance: She is obese.   Neck:      Vascular: No hepatojugular reflux or JVD.     Cardiovascular:      Rate and Rhythm: Regular rhythm. Tachycardia present.      Pulses: Normal pulses.      Heart sounds: Normal heart sounds. No murmur heard.     No friction rub. No gallop.   Pulmonary:      Effort: Pulmonary effort is normal. No respiratory distress.      Breath sounds: No rales.      Comments: Clear, room air  Abdominal:      General: Bowel sounds are normal. There is no distension.      Palpations: Abdomen is soft.      Tenderness: There is no abdominal tenderness.     Musculoskeletal:         General: No tenderness. Normal range of motion.      Cervical back: Neck supple.      Right lower leg: No edema.      Left lower leg: No edema.     Skin:     General: Skin is warm and dry.      Capillary Refill: Capillary refill takes less than 2 seconds.      Findings: No erythema.     Neurological:      Mental Status: She is alert and oriented to person, place, and time.     Psychiatric:         Mood and Affect: Mood is anxious.         LABORATORY " "RESULTS:      CBC with diff:   Results from last 7 days   Lab Units 06/13/25  1201   WBC Thousand/uL 10.90*   HEMOGLOBIN g/dL 15.3   HEMATOCRIT % 47.6*   MCV fL 86   PLATELETS Thousands/uL 307   RBC Million/uL 5.56*   MCH pg 27.5   MCHC g/dL 32.1   RDW % 13.8   MPV fL 10.3   NRBC AUTO /100 WBCs 0       CMP:  Results from last 7 days   Lab Units 06/13/25  1201   POTASSIUM mmol/L 4.5   CHLORIDE mmol/L 107   CO2 mmol/L 25   BUN mg/dL 24   CREATININE mg/dL 1.12   CALCIUM mg/dL 9.3   AST U/L 23   ALT U/L 33   ALK PHOS U/L 94   EGFR ml/min/1.73sq m 51       BMP:  Results from last 7 days   Lab Units 06/13/25  1201   POTASSIUM mmol/L 4.5   CHLORIDE mmol/L 107   CO2 mmol/L 25   BUN mg/dL 24   CREATININE mg/dL 1.12   CALCIUM mg/dL 9.3       Lab Results   Component Value Date    CREATININE 1.12 06/13/2025    CREATININE 1.06 11/18/2024    CREATININE 1.00 12/29/2022       No results found for: \"NTBNP\"       Results from last 7 days   Lab Units 06/13/25  1201   MAGNESIUM mg/dL 2.2                         Lipid Profile:   Lab Results   Component Value Date    CHOL 156 10/26/2015    CHOL 167 03/13/2015     Lab Results   Component Value Date    HDL 46 (L) 11/18/2024    HDL 40 (L) 12/29/2022    HDL 42 07/14/2021     Lab Results   Component Value Date    LDLCALC 107 (H) 11/18/2024    LDLCALC 89 12/29/2022    LDLCALC 100 07/14/2021     Lab Results   Component Value Date    TRIG 109 11/18/2024    TRIG 122 12/29/2022    TRIG 106 07/14/2021       Cardiac testing:   No results found for this or any previous visit.    No results found for this or any previous visit.    No valid procedures specified.  No results found for this or any previous visit.      Imaging: Results Review Statement: No pertinent imaging studies reviewed.    Thank you for allowing us to participate in this patient's care. This pt will follow up with          once discharged.     Counseling / Coordination of Care  Total floor / unit time spent today 45 minutes.  " Greater than 50% of total time was spent with the patient and / or family counseling and / or coordination of care.  A description of the counseling / coordination of care: Review of history, current assessment, development of a plan.    Code Status: No Order    ** Please Note: Dragon 360 Dictation voice to text software may have been used in the creation of this document. **         [1]   Past Medical History:  Diagnosis Date    Atrial fibrillation (HCC) 2021    Hypertension     Impaired fasting glucose     Pain     Paroxysmal atrial fibrillation (HCC)     Suspected ALISON    [2]   Past Surgical History:  Procedure Laterality Date    HYSTERECTOMY  12/23/2006    OOPHORECTOMY Right     unilateral    OTHER SURGICAL HISTORY      Excision of lesion Genitalia Bengin last assessed 11/01/16    ID COLONOSCOPY FLX DX W/COLLJ SPEC WHEN PFRMD N/A 7/21/2016    Procedure: COLONOSCOPY;  Surgeon: Forrest Blandon MD;  Location: [facility];  Service: Gastroenterology    TUBAL LIGATION     [3]   Social History  Tobacco Use   Smoking Status Never   Smokeless Tobacco Never   Tobacco Comments    Former   [4]   Allergies  Allergen Reactions    Erythromycin     Sulfa Antibiotics     Tetracycline

## 2025-06-13 NOTE — TELEPHONE ENCOUNTER
Pt mistakenly came to [place] office for her nurse visit at 9:20 this morning but it was scheduled for [facility]. No cardiology providers here at [facility] this morning so rescheduled pt for 10am at [facility] for her EKG nurse visit. Gave pt address. Pt is on their way now.

## 2025-06-13 NOTE — ED ATTENDING ATTESTATION
6/13/2025  I, Hardy Dominguez MD, saw and evaluated the patient. I have discussed the patient with the resident/non-physician practitioner and agree with the resident's/non-physician practitioner's findings, Plan of Care, and MDM as documented in the resident's/non-physician practitioner's note, except where noted. All available labs and Radiology studies were reviewed.  I was present for key portions of any procedure(s) performed by the resident/non-physician practitioner and I was immediately available to provide assistance.       At this point I agree with the current assessment done in the Emergency Department.  I have conducted an independent evaluation of this patient a history and physical is as follows:    History    Patient is a 64-year-old female, with a history significant for atrial fibrillation anticoagulated on Xarelto and started on flecainide per my review of the medical record, who presents to the ED today, at recommendation of cardiology, with recommendation for cardioversion due to a flutter.  Automated read on ECG reports acute MI; however, no STEMI on my interpretation and, per Dr. Brooks and Dr. Del Castillo from cardiology, this is not a STEMI instead a flutter with aberrancy/RBBB.  Patient states she noted her heart rate increasing Wednesday and, despite attempted increasing doses of flecainide to remit this, her heart rate has remained elevated up to the 140s.  Patient denies chest pain but describes dyspnea that is worse with exertion.  She also reports worsening bilateral lower extremity edema.  Patient's , present in room and providing collateral history, states patient is not confused.    Wt Readings from Last 3 Encounters:   06/13/25 102 kg (224 lb)   06/13/25 102 kg (224 lb)   06/03/25 102 kg (224 lb 8 oz)        Patient is without other concerns at this time.     ROS  Patient denies: Fever; dysphagia; vision change; chest pain; dyspnea; abdominal pain; polyuria; dysuria;  rash; weakness; numbness; difficulty walking; confusion    Physical Exam    GENERAL APPEARANCE: NAD  NEURO: Patient is speaking clearly in complete sentences.  Patient is answering appropriately and able follow commands.  Patient is moving all four extremities spontaneously.  No facial droop.  Tongue midline.  HEENT: PERRL, Moist mucous membranes, external ears normal, nose normal  Neck: No cervical adenopathy  CV: Tachycardic rate, irregular rhythm. No murmurs, rubs, gallops  LUNGS: Clear to auscultation: No wheezes, stridor, rhonchi, rales  GI: Abdomen non-distended. Soft. Non-tender and without rebound or guarding   : Deferred at this time  MSK: No deformity.  Bilateral lower extremity edema.  No pain with calf squeeze.  Skin: Warm and dry  Capillary refill: <2 seconds    Patient is currently afebrile, tachycardic, otherwise stable    Assessment/Plan/MDM  Tachycardia, lower extremity edema, dyspnea on exertion  -This presentation is concerning for: ACS, heart failure, arrhythmia, electrolyte abnormality, DANIELLE  - Will discuss with cardiology  - Will investigate with cardiac workup  - Will manage with cardioversion and further based on workup/recommendations    ED Course  ED Course as of 06/13/25 1441   Fri Jun 13, 2025   1215 Hemoglobin: 15.3  WNL    1253 Per Dr. Del Castillo from cardiology: OK to proceed with cardioversion. Admit recommendation with signs of heart failure    1253 hs TnI 0hr: 3  WNL    1328 Patient continues to decline procedural sedation for cardioversion but is agreeable with anxiolysis/analgesia   1408 On reevaluation, patient denies pain.  She is awake and conversive.  Does not remember the cardioversion.  Appears overall well         Critical Care Time  Pre-Procedural Sedation    Performed by: Hardy Dominguez MD  Authorized by: Hardy Dominguez MD    Consent:     Consent obtained:  Written    Consent given by:  Patient    Risks discussed:  Allergic reaction, prolonged hypoxia resulting  in organ damage, dysrhythmia, vomiting, nausea and inadequate sedation  Universal protocol:     Patient identity confirmation method:  Verbally with patient  Indications:     Sedation purpose:  Cardioversion  Pre-sedation assessment:     Time since last food or drink:  6 hours    ASA classification: class 2 - patient with mild systemic disease      Neck mobility: normal      Mouth opening:  3 or more finger widths    Thyromental distance:  4 finger widths    Mallampati score:  II - soft palate, uvula, fauces visible    Pre-sedation assessments completed and reviewed: airway patency, cardiovascular function, hydration status, mental status, nausea/vomiting, pain level, respiratory function and temperature      History of difficult intubation: no      Pre-sedation assessment completed:  6/13/2025 1:27 PM

## 2025-06-13 NOTE — ED PROVIDER NOTES
Time reflects when diagnosis was documented in both MDM as applicable and the Disposition within this note       Time User Action Codes Description Comment    6/13/2025  2:04 PM Renetta Garsia [I48.91] Atrial fibrillation with RVR (HCC)     6/13/2025  2:04 PM Renetta Garsia [I45.10] RBBB (right bundle branch block)     6/13/2025  2:04 PM Renetta Garsia [R60.9] Pitting edema     6/13/2025  2:04 PM Renetta Garsia [R06.09] Dyspnea on exertion     6/13/2025  2:05 PM Renetta Garsia [Z01.89] Encounter for cardioversion procedure     6/13/2025  2:51 PM Justine Braxton [I48.92] Atrial flutter, unspecified type (HCC)           ED Disposition       ED Disposition   Admit    Condition   Stable    Date/Time   Fri Jun 13, 2025  2:55 PM    Comment   Case was discussed with Dr. Brooks and the patient's admission status was agreed to be Admission Status: observation status to the service of Dr. Brooks.               Assessment & Plan       Medical Decision Making  64-year-old female with a PMH of Afib on Xarelto and HTN who presents to the ED upon referral from outpatient cardiology (Dr. Flaco Brooks) for abnormal EKG in the setting of recently starting flecainide with likely need for electrical cardioversion.     DDx including but not limited to: metabolic abnormality, cardiac arrhythmia, ACS, MI,  medication adverse reaction.     Patient presents tachycardic, but afebrile and hemodynamically stable. Resting comfortably and asymptomatic. Initial EKG shows A-flutter with aberrancy at 112 bpm with a RBBB. Further labs obtained due to noted 2+ bilateral pitting edema on exam. Labs significant for an elevated BNP, negative troponin, and borderline leukocytosis likely reactive. Confirmed with on-call cardiologist Dr. Del Castillo before proceeding with electrical cardioversion. Discussion with patient regarding risks and benefits of procedure as well as options for conscious sedation versus  anxiolysis/analgesia. Written consent obtained. Patient opting for anxiolysis/analgesia at this time. Patient given 2 mg IV versed and 50 mcg fentanyl. Synchronized electrocardioversion with 200J performed once with repeat EKG showing NSR at 67 bpm with RBBB.  Patient tolerated the procedure well without complication. Per cardiology, recommending admission for follow-up cardiac monitoring as well as CHF work-up for new elevated BNP and pitting edema. Patient accepted to observation status with telemetry under the service of Dr. Mercedes Brooks.    Amount and/or Complexity of Data Reviewed  Labs: ordered.    Risk  Prescription drug management.  Decision regarding hospitalization.               Medications   midazolam (VERSED) injection 2 mg (2 mg Intravenous Given 6/13/25 1344)   fentaNYL injection 50 mcg (50 mcg Intravenous Given 6/13/25 1344)   sodium chloride 0.9 % bolus 250 mL (250 mL Intravenous New Bag 6/13/25 1409)       ED Risk Strat Scores                  (ISAR) Identification of Seniors at Risk  Before the illness or injury that brought you to the Emergency, did you need someone to help you on a regular basis?: 0  In the last 24 hours, have you needed more help than usual?: 0  Have you been hospitalized for one or more nights during the past 6 months?: 0  In general, do you see well?: 0  In general, do you have serious problems with your memory?: 0  Do you take more than three different medications every day?: 0  ISAR Score: 0                                History of Present Illness       Chief Complaint   Patient presents with    Abnormal Lab     Patient was being seen in cardiology office and had an abnormal EKG. Sent to ED for evaluation. 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        Past Medical History[1]   Past Surgical History[2]   Family History[3]   Social History[4]   E-Cigarette/Vaping    E-Cigarette Use Never  User       E-Cigarette/Vaping Substances    Nicotine No     THC No     CBD No     Flavoring No     Other No     Unknown No       I have reviewed and agree with the history as documented.     64-year-old female with a PMH of Afib on Xarelto and HTN who presents to the ED upon referral from outpatient cardiology (Dr. Flaco Brooks) for abnormal EKG in the setting of recently starting flecainide with likely need for electrical cardioversion. Patient denies any lightheadedness, dizziness, chest pain, heart palpitations, or shortness of breath. States she was recently started on flecainide for her Afib on 6/7. She was feeling great for the first 4 days, then noted to have heart rates sustaining in the 120s starting Wednesday night, after which her dose of flecainide was increased to 150 mg bid. Today, she returned to her cardiology outpatient office for follow-up, noted to have an abnormal EKG, and sent to the ED. Patient has been compliant with her Xarelto.           Review of Systems   Constitutional:  Negative for chills and fever.   Respiratory:  Negative for shortness of breath.    Cardiovascular:  Negative for chest pain and palpitations.   Gastrointestinal:  Negative for abdominal pain, diarrhea, nausea and vomiting.   Genitourinary:  Negative for dysuria and hematuria.   Musculoskeletal:  Negative for arthralgias and myalgias.   Neurological:  Negative for dizziness, light-headedness and headaches.   All other systems reviewed and are negative.          Objective       ED Triage Vitals [06/13/25 1103]   Temperature Pulse Blood Pressure Respirations SpO2 Patient Position - Orthostatic VS   98.4 °F (36.9 °C) (!) 111 116/70 18 97 % Sitting      Temp Source Heart Rate Source BP Location FiO2 (%) Pain Score    Oral Monitor Right arm -- No Pain      Vitals      Date and Time Temp Pulse SpO2 Resp BP Pain Score FACES Pain Rating User   06/14/25 1132 98 °F (36.7 °C) 63 94 % -- 114/61 -- -- DII   06/14/25 0936 -- 72 --  -- 120/72 -- -- LB   06/14/25 0649 -- 69 95 % -- 115/71 -- -- DII   06/13/25 2209 97.6 °F (36.4 °C) 68 96 % -- 114/77 -- -- DII   06/13/25 1931 -- 67 97 % -- 112/66 -- -- DII   06/13/25 1930 -- -- -- -- -- No Pain -- NA   06/13/25 1909 -- -- -- 16 -- -- -- RH   06/13/25 1909 98.2 °F (36.8 °C) 68 95 % -- 109/74 -- -- DII   06/13/25 1530 97.3 °F (36.3 °C) -- -- 18 116/72 No Pain -- JT   06/13/25 1527 97.3 °F (36.3 °C) 73 96 % -- 116/72 -- -- DII   06/13/25 1500 -- 70 97 % -- 108/59 -- -- HR   06/13/25 1415 -- 68 95 % 18 98/53 -- -- OSMAN   06/13/25 1412 -- -- -- -- 97/53 -- -- TB   06/13/25 1410 -- -- -- -- 93/50 -- -- TB   06/13/25 1403 -- 69 94 % -- -- -- -- TB   06/13/25 1400 -- 67 94 % 16 100/56 -- -- TB   06/13/25 1358 -- 67 93 % 18 98/57 -- -- TB   06/13/25 1357 -- 67 92 % -- -- -- -- TB   06/13/25 1354 -- 68 98 % 16 -- -- -- TB   06/13/25 1352 -- 67 98 % 16 111/79 -- -- TB   06/13/25 1351 -- 71 93 % -- 111/79 -- -- TB   06/13/25 1349 -- 112 93 % 18 134/69 -- -- TB   06/13/25 1348 -- 113 97 % -- 110/56 -- -- TB   06/13/25 1200 -- 109 96 % 18 122/89 -- -- OSMAN   06/13/25 1115 -- 112 96 % 18 111/61 -- -- OSMAN   06/13/25 1103 98.4 °F (36.9 °C) 111 97 % 18 116/70 No Pain -- LO            Physical Exam  Vitals and nursing note reviewed.   Constitutional:       General: She is not in acute distress.     Appearance: Normal appearance. She is normal weight. She is not ill-appearing, toxic-appearing or diaphoretic.   HENT:      Head: Normocephalic and atraumatic.      Right Ear: External ear normal.      Left Ear: External ear normal.      Nose: Nose normal.      Mouth/Throat:      Mouth: Mucous membranes are moist.     Eyes:      General: No scleral icterus.        Right eye: No discharge.         Left eye: No discharge.      Extraocular Movements: Extraocular movements intact.      Conjunctiva/sclera: Conjunctivae normal.       Cardiovascular:      Rate and Rhythm: Regular rhythm. Tachycardia present.      Heart sounds:  Normal heart sounds. No murmur heard.  Pulmonary:      Effort: Pulmonary effort is normal. No respiratory distress.      Breath sounds: Normal breath sounds. No wheezing, rhonchi or rales.   Abdominal:      General: Abdomen is flat.      Palpations: Abdomen is soft.      Tenderness: There is no abdominal tenderness. There is no guarding or rebound.     Musculoskeletal:         General: Normal range of motion.      Cervical back: Normal range of motion.      Right lower leg: Edema present.      Left lower leg: Edema present.      Comments: 2+ bilateral lower extremity pitting edema.     Skin:     General: Skin is warm and dry.      Capillary Refill: Capillary refill takes less than 2 seconds.     Neurological:      General: No focal deficit present.      Mental Status: She is alert and oriented to person, place, and time. Mental status is at baseline.     Psychiatric:         Mood and Affect: Mood normal.         Behavior: Behavior normal.         Results Reviewed       Procedure Component Value Units Date/Time    HS Troponin I 4hr [[id number]] Collected: 06/13/25 1629    Lab Status: Final result Specimen: Blood from Arm, Left Updated: 06/13/25 1728     hs TnI 4hr <2 ng/L      Delta 4hr hsTnI --    TSH, 3rd generation [[id number]]  (Normal) Collected: 06/13/25 1408    Lab Status: Final result Specimen: Blood from Arm, Right Updated: 06/13/25 1552     TSH 3RD GENERATION 1.450 uIU/mL     HS Troponin I 2hr [[id number]] Collected: 06/13/25 1408    Lab Status: Final result Specimen: Blood from Arm, Right Updated: 06/13/25 1543     hs TnI 2hr <2 ng/L      Delta 2hr hsTnI --    HS Troponin 0hr (reflex protocol) [[id number]]  (Normal) Collected: 06/13/25 1213    Lab Status: Edited Result - FINAL Specimen: Blood from Arm, Right Updated: 06/13/25 1317     hs TnI 0hr <2.3 ng/L     B-Type Natriuretic Peptide(BNP) [[id number]]  (Abnormal) Collected: 06/13/25 1213    Lab Status: Final result Specimen: Blood from Arm, Right Updated:  06/13/25 1243      pg/mL     Comprehensive metabolic panel [[id number]] Collected: 06/13/25 1201    Lab Status: Final result Specimen: Blood from Arm, Right Updated: 06/13/25 1231     Sodium 137 mmol/L      Potassium 4.5 mmol/L      Chloride 107 mmol/L      CO2 25 mmol/L      ANION GAP 5 mmol/L      BUN 24 mg/dL      Creatinine 1.12 mg/dL      Glucose 99 mg/dL      Calcium 9.3 mg/dL      AST 23 U/L      ALT 33 U/L      Alkaline Phosphatase 94 U/L      Total Protein 7.7 g/dL      Albumin 4.2 g/dL      Total Bilirubin 0.47 mg/dL      eGFR 51 ml/min/1.73sq m     Narrative:      [employer] guidelines for Chronic Kidney Disease (CKD):     Stage 1 with normal or high GFR (GFR > 90 mL/min/1.73 square meters)    Stage 2 Mild CKD (GFR = 60-89 mL/min/1.73 square meters)    Stage 3A Moderate CKD (GFR = 45-59 mL/min/1.73 square meters)    Stage 3B Moderate CKD (GFR = 30-44 mL/min/1.73 square meters)    Stage 4 Severe CKD (GFR = 15-29 mL/min/1.73 square meters)    Stage 5 End Stage CKD (GFR <15 mL/min/1.73 square meters)  Note: GFR calculation is accurate only with a steady state creatinine    Magnesium [[id number]]  (Normal) Collected: 06/13/25 1201    Lab Status: Final result Specimen: Blood from Arm, Right Updated: 06/13/25 1231     Magnesium 2.2 mg/dL     CBC and differential [[id number]]  (Abnormal) Collected: 06/13/25 1201    Lab Status: Final result Specimen: Blood from Arm, Right Updated: 06/13/25 1210     WBC 10.90 Thousand/uL      RBC 5.56 Million/uL      Hemoglobin 15.3 g/dL      Hematocrit 47.6 %      MCV 86 fL      MCH 27.5 pg      MCHC 32.1 g/dL      RDW 13.8 %      MPV 10.3 fL      Platelets 307 Thousands/uL      nRBC 0 /100 WBCs      Segmented % 50 %      Immature Grans % 1 %      Lymphocytes % 36 %      Monocytes % 9 %      Eosinophils Relative 3 %      Basophils Relative 1 %      Absolute Neutrophils 5.49 Thousands/µL      Absolute Immature Grans 0.10 Thousand/uL      Absolute  Lymphocytes 3.96 Thousands/µL      Absolute Monocytes 0.98 Thousand/µL      Eosinophils Absolute 0.29 Thousand/µL      Basophils Absolute 0.08 Thousands/µL             No orders to display       Cardioversion    Date/Time: 6/13/2025 1:48 PM    Performed by: Renetta Garsia MD  Authorized by: Renetta Garsia MD    Written consent obtained?: Yes    Risks and benefits: Risks, benefits and alternatives were discussed    Consent given by:  Patient  Patient identity confirmed:  Verbally with patient  Patient sedated: Yes    Sedation type: anxiolysis    Sedation:  Midazolam and see MAR for details  Analgesia:  Fentanyl and see MAR for details  Sedation start:  6/13/2025 1:48 PM  Sedation end:  6/13/2025 2:05 PM  Vital signs: Vital signs monitored during sedation    Cardioversion basis:  Elective  Elective indications: failure of anti-arrhythmic medications    Pre-procedure rhythm:  Atrial flutter (with aberrancy)  Electrodes:  Pads  Electrodes placed:  Anterior-posterior  Number of attempts:  1  Attempt 1:     Attempt 1 mode:  Synchronous    Attempt 1 waveform:  Biphasic    Attempt 1 shock (Joules):  200    Attempt 1 outcome:  Conversion to normal sinus rhythm  Post-procedure rhythm:  Normal sinus rhythm  Complications: no complications    Patient tolerance:  Patient tolerated the procedure well with no immediate complications  Procedural Sedation    Date/Time: 6/13/2025 1:48 PM    Performed by: Renetta Garsia MD  Authorized by: Renetta Garsia MD    Immediate pre-procedure details:     Reassessment: Patient reassessed immediately prior to procedure      Reviewed: vital signs, relevant labs/tests and NPO status      Verified: bag valve mask available, emergency equipment available, intubation equipment available, IV patency confirmed, oxygen available and reversal medications available    Procedure details (see MAR for exact dosages):     Sedation start time:  6/13/2025 1:48 PM    Preoxygenation:  Room air     Sedation:  Midazolam    Analgesia:  Fentanyl    Intra-procedure monitoring:  Blood pressure monitoring, continuous capnometry, frequent LOC assessments, frequent vital sign checks, continuous pulse oximetry and cardiac monitor    Intra-procedure events: none      Intra-procedure management:  Supplemental oxygen    Sedation end time:  6/13/2025 2:05 PM    Total sedation time (minutes):  17  Post-procedure details:     Post-sedation assessment completed:  6/13/2025 2:30 PM    Attendance: Constant attendance by certified staff until patient recovered      Recovery: Patient returned to pre-procedure baseline      Post-sedation assessments completed and reviewed: airway patency, cardiovascular function, hydration status, mental status, nausea/vomiting, pain level, respiratory function and temperature      Patient is stable for discharge or admission: yes      Patient tolerance:  Tolerated well, no immediate complications  ECG 12 Lead Documentation Only    Date/Time: 6/13/2025 11:30 AM    Performed by: Renetta Garsia MD  Authorized by: Renetta Garsia MD    Indications / Diagnosis:  Tachycardia  ECG reviewed by me, the ED Provider: yes    Patient location:  ED  Previous ECG:     Previous ECG:  Unavailable  Interpretation:     Interpretation: abnormal    Rate:     ECG rate:  112 bpm    ECG rate assessment: tachycardic    Rhythm:     Rhythm: atrial flutter      Rhythm comment:  With aberrancy  Ectopy:     Ectopy: none    QRS:     QRS axis:  Normal    QRS intervals:  Normal  Conduction:     Conduction: abnormal      Abnormal conduction: complete RBBB    ST segments:     ST segments:  Non-specific    Elevation:  III    Depression:  I, aVL and V1  T waves:     T waves: non-specific    ECG 12 Lead Documentation Only    Date/Time: 6/13/2025 1:55 PM    Performed by: Renetta Garsia MD  Authorized by: Renetta Garsia MD    Indications / Diagnosis:  Post cardioversion  ECG reviewed by me, the ED Provider: yes     Patient location:  ED  Previous ECG:     Previous ECG:  Compared to current    Comparison ECG info:  6/13/2025    Similarity:  Changes noted  Interpretation:     Interpretation: normal    Rate:     ECG rate:  67 bpm    ECG rate assessment: normal    Rhythm:     Rhythm: sinus rhythm    Ectopy:     Ectopy: none    QRS:     QRS axis:  Normal    QRS intervals:  Normal  Conduction:     Conduction: abnormal      Abnormal conduction: complete RBBB    ST segments:     ST segments:  Normal  T waves:     T waves: normal    Comments:      Normal QTc 441 ms.      ED Medication and Procedure Management   Prior to Admission Medications   Prescriptions Last Dose Informant Patient Reported? Taking?   METRONIDAZOLE, TOPICAL, 0.75 % LOTN  Self No No   Sig: Apply topically 2 (two) times a day   Xarelto 20 MG tablet  Self No No   Sig: Take 1 tablet (20 mg total) by mouth daily   flecainide (TAMBOCOR) 100 mg tablet  Self No No   Sig: Take 1 tablet (100 mg total) by mouth 2 (two) times a day   fluticasone (FLONASE) 50 mcg/act nasal spray  Self No No   Sig: SPRAY 2 SPRAYS INTO EACH NOSTRIL EVERY DAY   losartan (COZAAR) 100 MG tablet  Self No No   Sig: TAKE 1 TABLET BY MOUTH EVERY DAY   metoprolol succinate (TOPROL-XL) 100 mg 24 hr tablet  Self No No   Sig: TAKE 1 TABLET BY MOUTH EVERY DAY   omeprazole (PriLOSEC) 20 mg delayed release capsule  Self No No   Sig: TAKE 1 CAPSULE BY MOUTH EVERY DAY      Facility-Administered Medications: None     Discharge Medication List as of 6/14/2025 11:49 AM        START taking these medications    Details   amiodarone 200 mg tablet Multiple Dosages:Starting Sat 6/14/2025, Until Fri 6/20/2025 at 2359, THEN Starting Sat 6/21/2025, Until Fri 6/27/2025 at 2359, THEN Starting Sat 6/28/2025, Until Sun 7/27/2025 at 2359Take 1 tablet (200 mg total) by mouth 3 (three) times a day with m eals for 7 days, THEN 1 tablet (200 mg total) 2 (two) times a day with meals for 7 days, THEN 1 tablet (200 mg total) daily  with breakfast., Normal           CONTINUE these medications which have NOT CHANGED    Details   fluticasone (FLONASE) 50 mcg/act nasal spray SPRAY 2 SPRAYS INTO EACH NOSTRIL EVERY DAY, Normal      losartan (COZAAR) 100 MG tablet TAKE 1 TABLET BY MOUTH EVERY DAY, Starting Tue 2/4/2025, Normal      metoprolol succinate (TOPROL-XL) 100 mg 24 hr tablet TAKE 1 TABLET BY MOUTH EVERY DAY, Normal      METRONIDAZOLE, TOPICAL, 0.75 % LOTN Apply topically 2 (two) times a day, Starting Wed 11/20/2024, Normal      omeprazole (PriLOSEC) 20 mg delayed release capsule TAKE 1 CAPSULE BY MOUTH EVERY DAY, Starting Fri 4/4/2025, Normal      Xarelto 20 MG tablet Take 1 tablet (20 mg total) by mouth daily, Starting Fri 10/18/2024, Normal           STOP taking these medications       flecainide (TAMBOCOR) 100 mg tablet Comments:   Reason for Stopping:             Outpatient Discharge Orders   Ambulatory Referral to Cardiac Electrophysiology   Standing Status: Future Standing Exp. Date: 06/13/26      NM myocardial perfusion spect (rx stress and/or rest)   Standing Status: Future Standing Exp. Date: 06/13/29     ED SEPSIS DOCUMENTATION   Time reflects when diagnosis was documented in both MDM as applicable and the Disposition within this note       Time User Action Codes Description Comment    6/13/2025  2:04 PM Renetta Garsia [I48.91] Atrial fibrillation with RVR (HCC)     6/13/2025  2:04 PM Renetta Garsia [I45.10] RBBB (right bundle branch block)     6/13/2025  2:04 PM Renetta Garsia [R60.9] Pitting edema     6/13/2025  2:04 PM Renetta Garsia [R06.09] Dyspnea on exertion     6/13/2025  2:05 PM Renetta Garsia [Z01.89] Encounter for cardioversion procedure     6/13/2025  2:51 PM Justine Braxton [I48.92] Atrial flutter, unspecified type (HCC)                      [1]   Past Medical History:  Diagnosis Date    Atrial fibrillation (HCC) 2021    Hypertension     Impaired fasting glucose     Pain      Paroxysmal atrial fibrillation (HCC)     Suspected ALISON    [2]   Past Surgical History:  Procedure Laterality Date    HYSTERECTOMY  12/23/2006    OOPHORECTOMY Right     unilateral    OTHER SURGICAL HISTORY      Excision of lesion Genitalia Meron last assessed 11/01/16    NM COLONOSCOPY FLX DX W/COLLJ SPEC WHEN PFRMD N/A 7/21/2016    Procedure: COLONOSCOPY;  Surgeon: Forrest Blandon MD;  Location: [facility];  Service: Gastroenterology    TUBAL LIGATION     [3]   Family History  Problem Relation Name Age of Onset    COPD Mother Mom     Heart failure Mother Mom     Hyperlipidemia Mother Mom     Hypertension Mother Mom     Throat cancer Mother Mom         cause of death    COPD Father          moderate    No Known Problems Sister      Cancer Maternal Aunt Luisana         Urinary bladder    No Known Problems Maternal Aunt      No Known Problems Maternal Grandmother      Breast cancer Paternal Grandmother Grandmother 70   [4]   Social History  Tobacco Use    Smoking status: Never    Smokeless tobacco: Never    Tobacco comments:     Former   Vaping Use    Vaping status: Never Used   Substance Use Topics    Alcohol use: No     Comment: .    Drug use: No        Renetta Garsia MD  06/14/25 [id number]

## 2025-06-13 NOTE — ASSESSMENT & PLAN NOTE
Presented to office for ECG check after initiation of flecainide last week  2-3 days of palpitations  ECG showed Atrial flutter with RVR and RBBB.   Sent to ED   Now s/p cardioversion in the ED, in NSR with heart rate in the 70s  Anticoagulated on Xarelto  Taking Toprol  mg daily  Recent echo with preserved LV function

## 2025-06-14 VITALS
HEART RATE: 63 BPM | RESPIRATION RATE: 16 BRPM | DIASTOLIC BLOOD PRESSURE: 61 MMHG | OXYGEN SATURATION: 94 % | HEIGHT: 66 IN | BODY MASS INDEX: 36.16 KG/M2 | SYSTOLIC BLOOD PRESSURE: 114 MMHG | TEMPERATURE: 98 F | WEIGHT: 225 LBS

## 2025-06-14 LAB
ANION GAP SERPL CALCULATED.3IONS-SCNC: 5 MMOL/L (ref 4–13)
BUN SERPL-MCNC: 21 MG/DL (ref 5–25)
CALCIUM SERPL-MCNC: 8.9 MG/DL (ref 8.4–10.2)
CHLORIDE SERPL-SCNC: 106 MMOL/L (ref 96–108)
CO2 SERPL-SCNC: 28 MMOL/L (ref 21–32)
CREAT SERPL-MCNC: 1.1 MG/DL (ref 0.6–1.3)
GFR SERPL CREATININE-BSD FRML MDRD: 52 ML/MIN/1.73SQ M
GLUCOSE SERPL-MCNC: 92 MG/DL (ref 65–140)
POTASSIUM SERPL-SCNC: 4.3 MMOL/L (ref 3.5–5.3)
SODIUM SERPL-SCNC: 139 MMOL/L (ref 135–147)

## 2025-06-14 PROCEDURE — 99239 HOSP IP/OBS DSCHRG MGMT >30: CPT

## 2025-06-14 PROCEDURE — 80048 BASIC METABOLIC PNL TOTAL CA: CPT | Performed by: HOSPITALIST

## 2025-06-14 PROCEDURE — 99214 OFFICE O/P EST MOD 30 MIN: CPT | Performed by: INTERNAL MEDICINE

## 2025-06-14 RX ORDER — AMIODARONE HYDROCHLORIDE 200 MG/1
TABLET ORAL
Qty: 65 TABLET | Refills: 0 | Status: SHIPPED | OUTPATIENT
Start: 2025-06-14 | End: 2025-07-28

## 2025-06-14 RX ADMIN — LOSARTAN POTASSIUM 100 MG: 50 TABLET, FILM COATED ORAL at 09:36

## 2025-06-14 RX ADMIN — METOPROLOL SUCCINATE 100 MG: 100 TABLET, EXTENDED RELEASE ORAL at 09:36

## 2025-06-14 RX ADMIN — AMIODARONE HYDROCHLORIDE 200 MG: 200 TABLET ORAL at 08:00

## 2025-06-14 RX ADMIN — RIVAROXABAN 20 MG: 20 TABLET, FILM COATED ORAL at 09:36

## 2025-06-14 RX ADMIN — PANTOPRAZOLE SODIUM 40 MG: 40 TABLET, DELAYED RELEASE ORAL at 05:14

## 2025-06-14 NOTE — UTILIZATION REVIEW
Initial Clinical Review    Admission: Date/Time/Statement:   Admission Orders (From admission, onward)       Ordered        06/13/25 1456  Place in Observation  Once                          Orders Placed This Encounter   Procedures    Place in Observation     Standing Status:   Standing     Number of Occurrences:   1     Level of Care:   Med Surg [16]     ED Arrival Information       Expected   -    Arrival   6/13/2025 10:51    Acuity   Emergent              Means of arrival   Walk-In    Escorted by   Spouse    Service   Hospitalist    Admission type   Emergency              Arrival complaint   Abnormal Test Result             Chief Complaint   Patient presents with    Abnormal Lab     Patient was being seen in cardiology office and had an abnormal EKG. Sent to ED for evaluation. 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        Initial Presentation: 64 y.o. female  with a PMH of paro Afib, HTN, IFG who presents from cardiology office due to findings of atrial flutter. Patient does have history of paroxysmal atrial fibrillation for few years in the past, was having off-and-on episodes lasting for hours and resolving. Recently had a Zio patch monitor as outpatient which noted increased burden in was noted to have 1 symptomatic episode correlating with her A-fib RVR. Hence she was started on flecainide last week. She states that the initial 4 days she felt very good on that but day before yesterday she started feeling little unusual and was noted to be in A-fib by her Apple Watch and she herself was feeling some racing in her chest. When she came for her follow-up EKG appointment today, in her EKG she was noted to be in atrial flutter with RBBB with heart rate in 110s to 120s and she was sent to ED for consideration of cardioversion. In the emergency room send she underwent sedation with DC cardioversion, she has been taking her Xarelto  consistently.  She is asymptomatic now, normal sinus rhythm. Plan: Observation for Aflutter, dyspnea on exertion, Afib, morbid obesity, HTN: telemetry, stop flecainide, start amiodarone, continue home Toprol XL and Xarelto.     Cardiology consult: S/p successful cardioversion in the ED. Telemetry. D/c flecainide. Continue Toprol  mg daily. Add oral amiodarone until she can see EP as an outpatient. Continue Xarelto 20 mg daily.     ED Treatment-Medication Administration from 06/13/2025 1051 to 06/13/2025 1529         Date/Time Order Dose Route Action     06/13/2025 1344 midazolam (VERSED) injection 2 mg 2 mg Intravenous Given     06/13/2025 1344 fentaNYL injection 50 mcg 50 mcg Intravenous Given     06/13/2025 1409 sodium chloride 0.9 % bolus 250 mL 250 mL Intravenous New Bag            Scheduled Medications:  amiodarone, 200 mg, Oral, TID With Meals   Followed by  [START ON 6/20/2025] amiodarone, 200 mg, Oral, BID With Meals   Followed by  [START ON 6/28/2025] amiodarone, 200 mg, Oral, Daily With Breakfast  losartan, 100 mg, Oral, Daily  metoprolol succinate, 100 mg, Oral, Daily  pantoprazole, 40 mg, Oral, Early Morning  rivaroxaban, 20 mg, Oral, Daily      Continuous IV Infusions:     PRN Meds:     ED Triage Vitals [06/13/25 1103]   Temperature Pulse Respirations Blood Pressure SpO2 Pain Score   98.4 °F (36.9 °C) (!) 111 18 116/70 97 % No Pain     Weight (last 2 days)       Date/Time Weight    06/13/25 15:27:36 102 (225)    06/13/25 1103 102 (224)            Vital Signs (last 3 days)       Date/Time Temp Pulse Resp BP MAP (mmHg) SpO2 Calculated FIO2 (%) - Nasal Cannula Nasal Cannula O2 Flow Rate (L/min) O2 Device Patient Position - Orthostatic VS [place] Coma Scale Score Pain    06/14/25 06:49:40 -- 69 -- 115/71 86 95 % -- -- -- -- -- --    06/13/25 22:09:04 97.6 °F (36.4 °C) 68 -- 114/77 89 96 % -- -- -- -- -- --    06/13/25 19:31:59 -- 67 -- 112/66 81 97 % -- -- -- -- -- --    06/13/25 1930 -- -- -- -- --  -- -- -- -- -- 15 No Pain    06/13/25 19:09:20 98.2 °F (36.8 °C) 68 16 109/74 86 95 % -- -- None (Room air) Lying -- --    06/13/25 1530 97.3 °F (36.3 °C) -- 18 116/72 -- -- -- -- -- -- 15 No Pain    06/13/25 15:27:36 97.3 °F (36.3 °C) 73 -- 116/72 87 96 % -- -- None (Room air) Sitting -- --    06/13/25 1500 -- 70 -- 108/59 77 97 % -- -- None (Room air) Sitting -- --    06/13/25 1415 -- 68 18 98/53 70 95 % -- -- -- -- -- --    06/13/25 1412 -- -- -- 97/53 69 -- -- -- -- -- -- --    06/13/25 1410 -- -- -- 93/50 65 -- -- -- -- -- -- --    06/13/25 1403 -- 69 -- -- -- 94 % -- -- -- -- -- --    06/13/25 14:00:38 -- 67 16 100/56 73 94 % -- -- None (Room air) Sitting -- --    06/13/25 13:58:07 -- 67 18 98/57 71 93 % -- -- None (Room air) Sitting -- --    06/13/25 1357 -- 67 -- -- -- 92 % -- -- -- -- -- --    06/13/25 13:54:06 -- 68 16 -- -- 98 % -- -- None (Room air) -- -- --    06/13/25 13:52:14 -- 67 16 111/79 -- 98 % 28 2 L/min Nasal cannula -- -- --    06/13/25 1351 -- 71 -- 111/79 90 93 % -- -- -- -- -- --    06/13/25 13:49:40 -- 112 18 134/69 -- 93 % -- -- -- -- -- --    06/13/25 13:48:52 -- -- -- -- -- -- -- -- Nasal cannula -- -- --    06/13/25 1348 -- 113 -- 110/56 76 97 % -- -- -- -- -- --    06/13/25 1200 -- 109 18 122/89 101 96 % -- -- -- -- -- --    06/13/25 1117 -- -- -- -- -- -- -- -- None (Room air) -- 15 --    06/13/25 1115 -- 112 18 111/61 78 96 % -- -- -- -- -- --    06/13/25 1103 98.4 °F (36.9 °C) 111 18 116/70 89 97 % -- -- None (Room air) Sitting -- No Pain              Pertinent Labs/Diagnostic Test Results:   Radiology:  No orders to display     Cardiology:  ECG 12 lead   Final Result by Kan Del Castillo MD (06/13 2080)   Normal sinus rhythm   Right bundle branch block   Abnormal ECG   When compared with ECG of 13-Jun-2025 11:06, (unconfirmed)   Sinus rhythm has replaced Atrial flutter   Vent. rate has decreased by  45 bpm   QRS duration has decreased   Confirmed by Kan Del Castillo ([id number]) on 6/13/2025  5:08:51 PM      ECG 12 lead   Final Result by Kan Del Castillo MD (06/13 4039)    Suspect arm lead reversal, interpretation assumes no reversal   Atrial flutter   Right bundle branch block   Abnormal ECG   No previous ECGs available   Confirmed by Kan Del Castillo ([id number]) on 6/13/2025 5:08:30 PM        GI:  No orders to display           Results from last 7 days   Lab Units 06/13/25  1201   WBC Thousand/uL 10.90*   HEMOGLOBIN g/dL 15.3   HEMATOCRIT % 47.6*   PLATELETS Thousands/uL 307   TOTAL NEUT ABS Thousands/µL 5.49         Results from last 7 days   Lab Units 06/14/25  0500 06/13/25  1201   SODIUM mmol/L 139 137   POTASSIUM mmol/L 4.3 4.5   CHLORIDE mmol/L 106 107   CO2 mmol/L 28 25   ANION GAP mmol/L 5 5   BUN mg/dL 21 24   CREATININE mg/dL 1.10 1.12   EGFR ml/min/1.73sq m 52 51   CALCIUM mg/dL 8.9 9.3   MAGNESIUM mg/dL  --  2.2     Results from last 7 days   Lab Units 06/13/25  1201   AST U/L 23   ALT U/L 33   ALK PHOS U/L 94   TOTAL PROTEIN g/dL 7.7   ALBUMIN g/dL 4.2   TOTAL BILIRUBIN mg/dL 0.47         Results from last 7 days   Lab Units 06/14/25  0500 06/13/25  1201   GLUCOSE RANDOM mg/dL 92 99           Results from last 7 days   Lab Units 06/13/25  1629 06/13/25  1408 06/13/25  1213   HS TNI 0HR ng/L  --   --  <2.3   HS TNI 2HR ng/L  --  <2  --    HS TNI 4HR ng/L <2  --   --              Results from last 7 days   Lab Units 06/13/25  1408   TSH 3RD GENERATON uIU/mL 1.450           Results from last 7 days   Lab Units 06/13/25  1213   BNP pg/mL 273*         Past Medical History[1]  Present on Admission:   Atrial flutter (HCC)   Morbid obesity (HCC)   Essential hypertension   Paroxysmal atrial fibrillation (HCC)   Impaired fasting glucose   Suspected ALISON      Admitting Diagnosis: RBBB (right bundle branch block) [I45.10]  Dyspnea on exertion [R06.09]  Pitting edema [R60.9]  Abnormal laboratory test result [R89.9]  Atrial fibrillation with RVR (HCC) [I48.91]  Encounter for cardioversion procedure [Z01.89]  Atrial  flutter, unspecified type (HCC) [I48.92]  Age/Sex: 64 y.o. female    Network Utilization Review Department  ATTENTION: Please call with any questions or concerns to [phone] and carefully listen to the prompts so that you are directed to the right person. All voicemails are confidential.   For Discharge needs, contact Care Management DC Support Team at [phone] opt. 2  Send all requests for admission clinical reviews, approved or denied determinations and any other requests to dedicated fax number below belonging to the campus where the patient is receiving treatment. List of dedicated fax numbers for the Facilities:  FACILITY NAME UR FAX NUMBER   ADMISSION DENIALS (Administrative/Medical Necessity) [phone]   DISCHARGE SUPPORT TEAM (NETWORK) [phone]   PARENT CHILD HEALTH (Maternity/NICU/Pediatrics) [phone]   [facility] [phone]   [facility] [phone]   [facility] [phone]   [facility] [phone]   [facility] [phone]   [facility] [phone]   [facility] [phone]   [facility] [phone]   [facility] [phone]   [facility] [phone]   [facility] [phone]   [facility] [phone]              [1]   Past Medical History:  Diagnosis Date    Atrial fibrillation (HCC) 2021    Hypertension     Impaired fasting glucose     Pain     Paroxysmal atrial fibrillation (HCC)     Suspected ALISON

## 2025-06-14 NOTE — PROGRESS NOTES
Progress Note - Cardiology   Name: Sarah Calhoun 64 y.o. female I MRN: [id number]  Unit/Bed#: S -01 I Date of Admission: 6/13/2025   Date of Service: 6/14/2025 I Hospital Day: 0     Assessment & Plan  Atrial flutter (HCC)  Presented to office for ECG check after initiation of flecainide last week  2-3 days of palpitations  ECG showed Atrial flutter with RVR and RBBB.   Sent to ED   Now s/p cardioversion in the ED, in NSR with heart rate in the 70s  Anticoagulated on Xarelto  Taking Toprol  mg daily  Recent echo with preserved LV function      Discontinue flecainide. Started on amiodarone load. Continue load as ordered. Outpatient follow-up with the EP team for consideration of ablation or alternate antiarrhythmic.  Paroxysmal atrial fibrillation (HCC)  Presented in rapid atrial flutter now s/p cardioversion  PTA: flecainide 100 mg PO BID and Toprol  mg daily  OAC: Xarelto 20 mg daily    Discontinue flecainide. Started on amiodarone load. Continue load as ordered. Outpatient follow-up with the EP team for consideration of ablation or alternate antiarrhythmic.  Essential hypertension  Stable, last /79  Home Rx: losartan 100 mg daily, Toprol  mg daily  Impaired fasting glucose  A1c 6.4%  Morbid obesity (HCC)  BMI 37.86  Suspected ALISON  Snoring, daytime sleepiness. Does not want to have sleep study since she will not wear a mask/CPAP  Dyspnea on exertion  Outpatient stress test.      Subjective     Denies complaints. She remains in sinus rhythm on telemetry.    Objective :  Temp:  [97.3 °F (36.3 °C)-98.4 °F (36.9 °C)] 97.6 °F (36.4 °C)  HR:  [] 72  BP: ()/(50-89) 120/72  Resp:  [16-18] 16  SpO2:  [92 %-98 %] 95 %  O2 Device: None (Room air)  Nasal Cannula O2 Flow Rate (L/min):  [2 L/min] 2 L/min  Orthostatic Blood Pressures      Flowsheet Row Most Recent Value   Blood Pressure 120/72 filed at 06/14/2025 0936   Patient Position - Orthostatic VS Lying filed at 06/13/2025  1909          First Weight: Weight - Scale: 102 kg (224 lb) (06/13/25 1103)  Vitals:    06/13/25 1103 06/13/25 1527   Weight: 102 kg (224 lb) 102 kg (225 lb)     Physical Exam  Constitutional:       General: She is not in acute distress.     Appearance: She is well-developed.     Eyes:      General: No scleral icterus.     Conjunctiva/sclera: Conjunctivae normal.     Neck:      Vascular: No JVD.     Cardiovascular:      Rate and Rhythm: Normal rate and regular rhythm.      Heart sounds: No murmur heard.     No friction rub. No gallop.   Pulmonary:      Effort: Pulmonary effort is normal.      Breath sounds: Normal breath sounds. No wheezing or rales.   Chest:      Chest wall: No tenderness.   Abdominal:      General: Bowel sounds are normal. There is no distension.      Palpations: Abdomen is soft.      Tenderness: There is no abdominal tenderness.     Musculoskeletal:         General: Normal range of motion.      Cervical back: Normal range of motion and neck supple.     Skin:     General: Skin is warm and dry.      Findings: No erythema or rash.     Neurological:      Mental Status: She is alert and oriented to person, place, and time.     Psychiatric:         Behavior: Behavior normal.         Lab Results: I have reviewed the following results:CBC/BMP:   .     06/13/25  1201 06/14/25  0500   WBC 10.90*  --    HGB 15.3  --    HCT 47.6*  --      --    SODIUM 137 139   K 4.5 4.3    106   CO2 25 28   BUN 24 21   CREATININE 1.12 1.10   GLUC 99 92   MG 2.2  --       Results from last 7 days   Lab Units 06/13/25  1201   WBC Thousand/uL 10.90*   HEMOGLOBIN g/dL 15.3   HEMATOCRIT % 47.6*   PLATELETS Thousands/uL 307     Results from last 7 days   Lab Units 06/14/25  0500 06/13/25  1201   POTASSIUM mmol/L 4.3 4.5   CHLORIDE mmol/L 106 107   CO2 mmol/L 28 25   BUN mg/dL 21 24   CREATININE mg/dL 1.10 1.12   CALCIUM mg/dL 8.9 9.3         Lab Results   Component Value Date    HGBA1C 6.4 (H) 11/18/2024     No  "results found for: \"CKTOTAL\", \"CKMB\", \"CKMBINDEX\", \"TROPONINI\"    Imaging Results Review: No pertinent imaging studies reviewed.  Other Study Results Review: EKG was personally reviewed and my interpretation is: NSR. HR NSR.  ..  "

## 2025-06-14 NOTE — ASSESSMENT & PLAN NOTE
Has h/o paro afib, recently had increased burden on ziopatch with some 1 episode being symptomatic  hence was started on flecainide a week ago, initially felt well for 4 days then started feeling something in chest with her apple watch showing afib  Today she went to cardio office for post fleicanide EKG which showed A flutter with RVR HR 110s hence she was sent to ED for DCCV as she has been on OAC consistently  In ED HR 110s, s/p sedation & DCCV now in NSR  Seen by IP cardio team - plan is to monitor her overnight on tele & if asymptomatic then DC home tomorrow  They also rec to stop flecainide & start on PO amiodarone with loading 200 TID x 1 wk then 200 BID x 1 wk then 200 mg daily with goal to be on it only for short term  With plan for OP EP f/u for consideration for ablation  Cont home toprol  mg QD & xarelto daily  Cardioverted yesterday 6/13  Received amiodarone load with  ongoing tapering

## 2025-06-14 NOTE — DISCHARGE SUMMARY
Discharge Summary - Hospitalist   Name: Sarah Calhoun 64 y.o. female I MRN: [id number]  Unit/Bed#: S -01 I Date of Admission: 6/13/2025   Date of Service: 6/14/2025 I Hospital Day: 0     Assessment & Plan  Atrial flutter (HCC)  Has h/o paro afib, recently had increased burden on ziopatch with some 1 episode being symptomatic  hence was started on flecainide a week ago, initially felt well for 4 days then started feeling something in chest with her apple watch showing afib  Today she went to cardio office for post fleicanide EKG which showed A flutter with RVR HR 110s hence she was sent to ED for DCCV as she has been on OAC consistently  In ED HR 110s, s/p sedation & DCCV now in NSR  Seen by IP cardio team - plan is to monitor her overnight on tele & if asymptomatic then DC home tomorrow  They also rec to stop flecainide & start on PO amiodarone with loading 200 TID x 1 wk then 200 BID x 1 wk then 200 mg daily with goal to be on it only for short term  With plan for OP EP f/u for consideration for ablation  Cont home toprol  mg QD & xarelto daily  Cardioverted yesterday 6/13  Received amiodarone load with  ongoing tapering  Dyspnea on exertion  Some GILES moderate exertion for 2 years  Per cardio no evidence of volume overload  Recommend OP stress test & OP sleep study - consider AMB referral for sleep study upon DC  Paroxysmal atrial fibrillation (HCC)  H/o same for some years  Has stopped drinking alcohol, no smoking  Now interested in sleep study  Morbid obesity (HCC)    Impaired fasting glucose    Essential hypertension  BP low today post DCCV  Cont home toprol xl 100 mg & losartan 100 mg QD from tomorrow  Suspected ALISON       Medical Problems       Resolved Problems  Date Reviewed: 6/14/2025   None       Discharging Physician / Practitioner: MATTI Bills  PCP: Jenna Knapp PA-C  Admission Date:   Admission Orders (From admission, onward)       Ordered        06/13/25 1456  Place  "in Observation  Once                          Discharge Date: 06/14/25    Medication Changes for Discharge & Rationale:     See after visit summary for reconciled discharge medications provided to patient and/or family.     Consultations During Hospital Stay:  Cardiology    Procedures Performed:   Cardioversion    Hospital Course:   Sarah Calhoun is a 64 y.o. female patient who originally presented to the hospital on 6/13/2025 due to a atrial flutter with RVR.  Cardiology was consulted and patient was cardioverted with good results.  Now maintaining normal sinus rhythm and per cardiology can be discharged home.  Patient will be given ambulatory referral for sleep study at her request for suspected ALISON.          Please see above list of diagnoses and related plan for additional information.     Discharge Day Visit / Exam:     Subjective: Seen sitting up in bed with  at bedside.  Denies chest pain, chest tightness, palpitations, dizziness or lightheadedness.  Reports overall improvement.      Vitals: Blood Pressure: 120/72 (06/14/25 0936)  Pulse: 72 (06/14/25 0936)  Temperature: 97.6 °F (36.4 °C) (06/13/25 2209)  Temp Source: Oral (06/13/25 1909)  Respirations: 16 (06/13/25 1909)  Height: 5' 6\" (167.6 cm) (06/13/25 1527)  Weight - Scale: 102 kg (225 lb) (06/13/25 1527)  SpO2: 95 % (06/14/25 0649)  Physical Exam  Vitals and nursing note reviewed.   Constitutional:       General: She is not in acute distress.     Appearance: She is well-developed.   HENT:      Head: Normocephalic and atraumatic.     Eyes:      Conjunctiva/sclera: Conjunctivae normal.       Cardiovascular:      Rate and Rhythm: Normal rate.      Heart sounds: No murmur heard.  Pulmonary:      Effort: Pulmonary effort is normal. No respiratory distress.      Breath sounds: Normal breath sounds.   Abdominal:      Palpations: Abdomen is soft.      Tenderness: There is no abdominal tenderness.     Musculoskeletal:         General: No swelling.      " Cervical back: Neck supple.     Skin:     General: Skin is warm and dry.      Capillary Refill: Capillary refill takes less than 2 seconds.     Neurological:      Mental Status: She is alert and oriented to person, place, and time.     Psychiatric:         Mood and Affect: Mood normal.          Discussion with Family: Updated  () at bedside.    Discharge instructions/Information to patient and family:   See after visit summary for information provided to patient and family.      Provisions for Follow-Up Care:  See after visit summary for information related to follow-up care and any pertinent home health orders.      Mobility at time of Discharge:   Basic Mobility Inpatient Raw Score: 24  JH-HLM Goal: 8: Walk 250 feet or more  JH-HLM Achieved: 8: Walk 250 feet ot more  HLM Goal achieved. Continue to encourage appropriate mobility.     Disposition:   Home    Planned Readmission: No    Administrative Statements   Discharge Statement:  I have spent a total time of  minutes in caring for this patient on the day of the visit/encounter. .    **Please Note: This note may have been constructed using a voice recognition system**

## 2025-06-14 NOTE — ASSESSMENT & PLAN NOTE
Presented to office for ECG check after initiation of flecainide last week  2-3 days of palpitations  ECG showed Atrial flutter with RVR and RBBB.   Sent to ED   Now s/p cardioversion in the ED, in NSR with heart rate in the 70s  Anticoagulated on Xarelto  Taking Toprol  mg daily  Recent echo with preserved LV function      Discontinue flecainide. Started on amiodarone load. Continue load as ordered. Outpatient follow-up with the EP team for consideration of ablation or alternate antiarrhythmic.

## 2025-06-14 NOTE — ASSESSMENT & PLAN NOTE
Presented in rapid atrial flutter now s/p cardioversion  PTA: flecainide 100 mg PO BID and Toprol  mg daily  OAC: Xarelto 20 mg daily    Discontinue flecainide. Started on amiodarone load. Continue load as ordered. Outpatient follow-up with the EP team for consideration of ablation or alternate antiarrhythmic.

## 2025-06-16 ENCOUNTER — TELEPHONE (OUTPATIENT)
Age: 65
End: 2025-06-16

## 2025-06-16 ENCOUNTER — TRANSITIONAL CARE MANAGEMENT (OUTPATIENT)
Dept: FAMILY MEDICINE CLINIC | Facility: CLINIC | Age: 65
End: 2025-06-16

## 2025-06-16 NOTE — TELEPHONE ENCOUNTER
"Hello,    The following message was sent via e-mail to the leadership team:     Please advise if you can help facilitate the following overbook request:    Patient Name: Sarah Calhoun    Patient MRN: [id number]    Call back #:     Insurance: [employer]/ [facility]    Department:Cardiology    Speciality: EP    Reason for overbook request: PATIENT REQUEST    Comments (Write \"N/a\" if no comments): 1st avail is 9/24/24 with any EP DR    Requested doctor and location: [facility]/ [place], [place], [place], [address], [place]-last choice    Date of current appointment: 9/24/25      Thank you.       "

## 2025-06-17 ENCOUNTER — APPOINTMENT (EMERGENCY)
Dept: RADIOLOGY | Facility: HOSPITAL | Age: 65
End: 2025-06-17
Payer: COMMERCIAL

## 2025-06-17 ENCOUNTER — NURSE TRIAGE (OUTPATIENT)
Age: 65
End: 2025-06-17

## 2025-06-17 ENCOUNTER — HOSPITAL ENCOUNTER (EMERGENCY)
Facility: HOSPITAL | Age: 65
Discharge: HOME | End: 2025-06-18
Attending: EMERGENCY MEDICINE
Payer: COMMERCIAL

## 2025-06-17 DIAGNOSIS — I48.92 ATRIAL FLUTTER (HCC): Primary | ICD-10-CM

## 2025-06-17 LAB
ALBUMIN SERPL BCG-MCNC: 4 G/DL (ref 3.5–5)
ALP SERPL-CCNC: 93 U/L (ref 34–104)
ALT SERPL W P-5'-P-CCNC: 24 U/L (ref 7–52)
ANION GAP SERPL CALCULATED.3IONS-SCNC: 9 MMOL/L (ref 4–13)
AST SERPL W P-5'-P-CCNC: 20 U/L (ref 13–39)
BASOPHILS # BLD AUTO: 0.06 THOUSANDS/ÂΜL (ref 0–0.1)
BASOPHILS NFR BLD AUTO: 1 % (ref 0–1)
BILIRUB SERPL-MCNC: 0.37 MG/DL (ref 0.2–1)
BUN SERPL-MCNC: 20 MG/DL (ref 5–25)
CALCIUM SERPL-MCNC: 9.5 MG/DL (ref 8.4–10.2)
CARDIAC TROPONIN I PNL SERPL HS: 5 NG/L (ref ?–50)
CHLORIDE SERPL-SCNC: 107 MMOL/L (ref 96–108)
CO2 SERPL-SCNC: 24 MMOL/L (ref 21–32)
CREAT SERPL-MCNC: 1.08 MG/DL (ref 0.6–1.3)
EOSINOPHIL # BLD AUTO: 0.27 THOUSAND/ÂΜL (ref 0–0.61)
EOSINOPHIL NFR BLD AUTO: 2 % (ref 0–6)
ERYTHROCYTE [DISTWIDTH] IN BLOOD BY AUTOMATED COUNT: 13.7 % (ref 11.6–15.1)
GFR SERPL CREATININE-BSD FRML MDRD: 53 ML/MIN/1.73SQ M
GLUCOSE SERPL-MCNC: 99 MG/DL (ref 65–140)
HCT VFR BLD AUTO: 41.8 % (ref 34.8–46.1)
HGB BLD-MCNC: 13.5 G/DL (ref 11.5–15.4)
IMM GRANULOCYTES # BLD AUTO: 0.05 THOUSAND/UL (ref 0–0.2)
IMM GRANULOCYTES NFR BLD AUTO: 1 % (ref 0–2)
LYMPHOCYTES # BLD AUTO: 4.12 THOUSANDS/ÂΜL (ref 0.6–4.47)
LYMPHOCYTES NFR BLD AUTO: 37 % (ref 14–44)
MCH RBC QN AUTO: 27.2 PG (ref 26.8–34.3)
MCHC RBC AUTO-ENTMCNC: 32.3 G/DL (ref 31.4–37.4)
MCV RBC AUTO: 84 FL (ref 82–98)
MONOCYTES # BLD AUTO: 0.86 THOUSAND/ÂΜL (ref 0.17–1.22)
MONOCYTES NFR BLD AUTO: 8 % (ref 4–12)
NEUTROPHILS # BLD AUTO: 5.69 THOUSANDS/ÂΜL (ref 1.85–7.62)
NEUTS SEG NFR BLD AUTO: 51 % (ref 43–75)
NRBC BLD AUTO-RTO: 0 /100 WBCS
PLATELET # BLD AUTO: 247 THOUSANDS/UL (ref 149–390)
PMV BLD AUTO: 10 FL (ref 8.9–12.7)
POTASSIUM SERPL-SCNC: 4.2 MMOL/L (ref 3.5–5.3)
PROT SERPL-MCNC: 7.4 G/DL (ref 6.4–8.4)
RBC # BLD AUTO: 4.96 MILLION/UL (ref 3.81–5.12)
SODIUM SERPL-SCNC: 140 MMOL/L (ref 135–147)
WBC # BLD AUTO: 11.05 THOUSAND/UL (ref 4.31–10.16)

## 2025-06-17 PROCEDURE — 71045 X-RAY EXAM CHEST 1 VIEW: CPT

## 2025-06-17 PROCEDURE — 80053 COMPREHEN METABOLIC PANEL: CPT | Performed by: EMERGENCY MEDICINE

## 2025-06-17 PROCEDURE — 84484 ASSAY OF TROPONIN QUANT: CPT | Performed by: EMERGENCY MEDICINE

## 2025-06-17 PROCEDURE — 85025 COMPLETE CBC W/AUTO DIFF WBC: CPT | Performed by: EMERGENCY MEDICINE

## 2025-06-17 PROCEDURE — 99285 EMERGENCY DEPT VISIT HI MDM: CPT

## 2025-06-17 PROCEDURE — 93005 ELECTROCARDIOGRAM TRACING: CPT

## 2025-06-17 PROCEDURE — 36415 COLL VENOUS BLD VENIPUNCTURE: CPT | Performed by: EMERGENCY MEDICINE

## 2025-06-17 RX ORDER — METOPROLOL TARTRATE 1 MG/ML
5 INJECTION, SOLUTION INTRAVENOUS ONCE
Status: DISCONTINUED | OUTPATIENT
Start: 2025-06-17 | End: 2025-06-18 | Stop reason: HOSPADM

## 2025-06-17 NOTE — TELEPHONE ENCOUNTER
"REASON FOR CONVERSATION: elevated heart rate    SYMPTOMS: Pt called in stating that when she gets to close to her next dose of amiodarone her heart rate jumps up to around 130s. She states for about 1/2 hour her heart rate goes up and down before it regulates. She is wondering when medication will take full effect.     Heart rate range     OTHER HEALTH INFORMATION: recnetly in the hospital from 6/13-6/14    PROTOCOL DISPOSITION: Discuss with Provider and Call Back Patient    CARE ADVICE PROVIDED: Advised that a message will be sent to provider and we will give her a call back    PRACTICE FOLLOW-UP: Please follow up regarding medication question.      Answer Assessment - Initial Assessment Questions  1. DESCRIPTION: \"Please describe your heart rate or heartbeat that you are having\" (e.g., fast/slow, regular/irregular, skipped or extra beats, \"palpitations\")      Elevated heart rate   2. ONSET: \"When did it start?\" (e.g., minutes, hours, days)       Started back on 6/13  3. DURATION: \"How long does it last\" (e.g., seconds, minutes, hours)      Half hour   4. PATTERN \"Does it come and go, or has it been constant since it started?\"  \"Does it get worse with exertion?\"   \"Are you feeling it now?\"      Comes and goes   6. HEART RATE: \"Can you tell me your heart rate?\" \"How many beats in 15 seconds?\"  Note: Not all patients can do this.        91  7. RECURRENT SYMPTOM: \"Have you ever had this before?\" If Yes, ask: \"When was the last time?\" and \"What happened that time?\"       On going recently in the hospital   8. CAUSE: \"What do you think is causing the palpitations?\"      Denies   9. CARDIAC HISTORY: \"Do you have any history of heart disease?\" (e.g., heart attack, angina, bypass surgery, angioplasty, arrhythmia)       afib  10. OTHER SYMPTOMS: \"Do you have any other symptoms?\" (e.g., dizziness, chest pain, sweating, difficulty breathing)        Denies    Protocols used: Heart Rate and Heartbeat " Questions-Adult-[place]

## 2025-06-17 NOTE — TELEPHONE ENCOUNTER
Received call back from pt.  Relayed message from Dr. Brooks.  She said since calling her HR has been fluctuating 85- 135 bpm.  Denies chest pain/sob.  While on the phone, HR was 90 then went up to 132 bpm.  She thinks she may have some anxiety.  Last dose of amiodarone was 2:30 pm.  Please advise.

## 2025-06-18 VITALS
RESPIRATION RATE: 22 BRPM | OXYGEN SATURATION: 93 % | DIASTOLIC BLOOD PRESSURE: 79 MMHG | TEMPERATURE: 98.8 F | SYSTOLIC BLOOD PRESSURE: 138 MMHG | HEART RATE: 66 BPM

## 2025-06-18 LAB
2HR DELTA HS TROPONIN: -2 NG/L
ATRIAL RATE: 271 BPM
ATRIAL RATE: 69 BPM
CARDIAC TROPONIN I PNL SERPL HS: 3 NG/L (ref ?–50)
P AXIS: 13 DEGREES
PR INTERVAL: 176 MS
QRS AXIS: 106 DEGREES
QRS AXIS: 90 DEGREES
QRSD INTERVAL: 92 MS
QRSD INTERVAL: 94 MS
QT INTERVAL: 362 MS
QT INTERVAL: 458 MS
QTC INTERVAL: 489 MS
QTC INTERVAL: 490 MS
T WAVE AXIS: 22 DEGREES
T WAVE AXIS: 80 DEGREES
VENTRICULAR RATE: 110 BPM
VENTRICULAR RATE: 69 BPM

## 2025-06-18 PROCEDURE — 99284 EMERGENCY DEPT VISIT MOD MDM: CPT | Performed by: EMERGENCY MEDICINE

## 2025-06-18 PROCEDURE — 93010 ELECTROCARDIOGRAM REPORT: CPT | Performed by: INTERNAL MEDICINE

## 2025-06-18 NOTE — PROGRESS NOTES
Spoke with patient she does not want to move her appointment into the TCM window she stated she will follow up at 7/16 when scheduled.

## 2025-06-18 NOTE — DISCHARGE INSTRUCTIONS
Please follow-up with your cardiologist.  Please return to the emergency department if your symptoms return.

## 2025-06-18 NOTE — TELEPHONE ENCOUNTER
Spoke to patient. She wanted dr rodriguez to know she was back in the ER last night. They were going to give her IV metoprolol but then the heart rate went down. So they just watched her and did some labs.

## 2025-06-19 ENCOUNTER — TELEPHONE (OUTPATIENT)
Dept: CARDIOLOGY CLINIC | Facility: CLINIC | Age: 65
End: 2025-06-19

## 2025-06-19 ENCOUNTER — PREP FOR PROCEDURE (OUTPATIENT)
Dept: CARDIOLOGY CLINIC | Facility: CLINIC | Age: 65
End: 2025-06-19

## 2025-06-19 DIAGNOSIS — I48.92 ATRIAL FLUTTER, UNSPECIFIED TYPE (HCC): ICD-10-CM

## 2025-06-19 DIAGNOSIS — I48.91 ATRIAL FIBRILLATION WITH RVR (HCC): Primary | ICD-10-CM

## 2025-06-19 NOTE — TELEPHONE ENCOUNTER
Patient scheduled for   FLORENTIN/AFIB-PFA/FLUTTER ABL     at [facility] on 08/05/2025  with Dr Downing.  Patient aware of general instructions; labs test required.   Meds holds:   Losartan to hold 24 hrs prior procedure.  Xarelto no holds.

## 2025-06-19 NOTE — TELEPHONE ENCOUNTER
----- Message from Jaime Downing MD sent at 6/18/2025  3:45 PM EDT -----  Regarding: atrial fibrillation ablation  Karson Peacock/Lashay/Lesa,    Please schedule this patient for FLORENTIN/atrial fib/flutter ablation.     Routine labs    Hold medication: none    System/Device company: [employer]    Thank you

## 2025-06-20 NOTE — TELEPHONE ENCOUNTER
"Patient calling back to clarify the instructions for her medications.  Relayed and discussed message from Dr. Brooks: \"Flaco Brooks MD to Mariza Prasad MA   6/18/25  9:22 AM  Spoke to patient over the phone.  She had episode of tachycardia lasting for over 6 hours yesterday and as a result went to [facility] ED.  Initial EKG did show atrial flutter with rapid rates, but subsequently she spontaneously converted back to sinus rhythm.  She is currently taking metoprolol succinate 100 mg daily.    Plan  I have asked her to increase metoprolol succinate to 100 mg in a.m./50 mg in p.m. she will monitor her blood pressure and heart rate.    -If blood pressure is getting low in the 100-110 systolic, or she is feeling dizzy, then she will decrease losartan to 50 mg daily.  - If continued issues with tachycardia, then we will plan to increase metoprolol succinate to 100 mg twice daily and decrease losartan to 50 mg daily   I have additionally reached out to EP Dr. Downing, to see if he can facilitate an earlier office visit for her to try and get the ablation done sooner.\"    Patient verbalized understanding. She is only taking Metoprolol Succinate 100 mg in AM, states her HR has been controlled in 70s as of now. Encouraged patient to call back with further questions. She has appointment scheduled for ablation on 8/5/25.   "

## 2025-06-24 ENCOUNTER — HOSPITAL ENCOUNTER (OUTPATIENT)
Dept: NON INVASIVE DIAGNOSTICS | Facility: CLINIC | Age: 65
Discharge: HOME | End: 2025-06-24
Attending: NURSE PRACTITIONER

## 2025-06-29 ENCOUNTER — HOSPITAL ENCOUNTER (OUTPATIENT)
Dept: SLEEP CENTER | Facility: CLINIC | Age: 65
Discharge: HOME | End: 2025-06-29
Attending: INTERNAL MEDICINE
Payer: COMMERCIAL

## 2025-06-29 DIAGNOSIS — G47.33 OSA (OBSTRUCTIVE SLEEP APNEA): ICD-10-CM

## 2025-06-29 DIAGNOSIS — I48.0 PAROXYSMAL ATRIAL FIBRILLATION (HCC): ICD-10-CM

## 2025-06-29 DIAGNOSIS — E66.09 CLASS 2 OBESITY DUE TO EXCESS CALORIES WITHOUT SERIOUS COMORBIDITY WITH BODY MASS INDEX (BMI) OF 38.0 TO 38.9 IN ADULT: ICD-10-CM

## 2025-06-29 DIAGNOSIS — E66.812 CLASS 2 OBESITY DUE TO EXCESS CALORIES WITHOUT SERIOUS COMORBIDITY WITH BODY MASS INDEX (BMI) OF 38.0 TO 38.9 IN ADULT: ICD-10-CM

## 2025-06-29 PROCEDURE — 95810 POLYSOM 6/> YRS 4/> PARAM: CPT | Performed by: INTERNAL MEDICINE

## 2025-06-29 PROCEDURE — 95810 POLYSOM 6/> YRS 4/> PARAM: CPT

## 2025-06-30 NOTE — PROGRESS NOTES
Sleep Study Documentation    Pre-Sleep Study       Sleep testing procedure explained to patient:YES    Patient napped prior to study:YES- less than 30 minutes. Napped after 2PM: no    Caffeine:Dayshift worker after 12PM.  Caffeine use:YES- chocolate milk  8 ounces    Alcohol:Dayshift workers after 5PM: Alcohol use:NO    Typical day for patient:NO         Study Documentation    Sleep Study Indications: I48.0, G47.33, E66.812    Montage used: Standard    Transcutaneous CO2 used: NO    Sleep Study Type:     Diagnostic Sleep Study     Treatment:       Oxygen Data  Supplemental O2 used: No      EKG/EEG/Abnormal Behaviors   EKG abnormalities: yes:  EPOCH example and comments: None, PVCs   (example, epoch   ), definite atrial fibrillation (example, epoch  )sinus pauses    EEG abnormalities: no    Were abnormal behaviors in sleep observed:NO  No    Snoring    Character:  Moderate and heavy    Frequency: frequent        Is Total Sleep Study Recording Time < 2 hours: N/A    Is Total Sleep Study Recording Time > 2 hours but study is incomplete: N/A    Is Total Sleep Study Recording Time 6 hours or more but sleep was not obtained: NO        Post-Sleep Study    Medication used at bedtime or during sleep study:NO    Patient reports time it took to fall asleep:greater than 60 minutes    Patient reports waking up during study:3 or more times.  Patient reports returning to sleep in greater than 30 minutes.    Patient reports sleeping less than 2 hours without dreaming.    Does the Patient feel this is a typical night of sleep:worse than usual    Patient rated sleepiness: Very sleepy or tired

## 2025-07-11 ENCOUNTER — HOSPITAL ENCOUNTER (OUTPATIENT)
Age: 65
Discharge: HOME | End: 2025-07-11
Attending: OBSTETRICS & GYNECOLOGY
Payer: COMMERCIAL

## 2025-07-11 ENCOUNTER — CONSULT (OUTPATIENT)
Dept: CARDIOLOGY CLINIC | Facility: CLINIC | Age: 65
End: 2025-07-11
Attending: NURSE PRACTITIONER
Payer: COMMERCIAL

## 2025-07-11 VITALS
HEART RATE: 63 BPM | BODY MASS INDEX: 37.83 KG/M2 | SYSTOLIC BLOOD PRESSURE: 122 MMHG | WEIGHT: 221.6 LBS | HEIGHT: 64 IN | DIASTOLIC BLOOD PRESSURE: 84 MMHG

## 2025-07-11 VITALS — HEIGHT: 64 IN | BODY MASS INDEX: 38.41 KG/M2 | WEIGHT: 225 LBS

## 2025-07-11 DIAGNOSIS — R73.01 IMPAIRED FASTING GLUCOSE: ICD-10-CM

## 2025-07-11 DIAGNOSIS — I48.92 ATRIAL FLUTTER, UNSPECIFIED TYPE (HCC): ICD-10-CM

## 2025-07-11 DIAGNOSIS — G47.33 OSA (OBSTRUCTIVE SLEEP APNEA): ICD-10-CM

## 2025-07-11 DIAGNOSIS — Z90.710 S/P LAPAROSCOPIC HYSTERECTOMY: ICD-10-CM

## 2025-07-11 DIAGNOSIS — I48.91 ATRIAL FIBRILLATION WITH RVR (HCC): Primary | ICD-10-CM

## 2025-07-11 DIAGNOSIS — I10 ESSENTIAL HYPERTENSION: ICD-10-CM

## 2025-07-11 DIAGNOSIS — E66.01 MORBID OBESITY (HCC): ICD-10-CM

## 2025-07-11 DIAGNOSIS — Z12.31 ENCOUNTER FOR SCREENING MAMMOGRAM FOR MALIGNANT NEOPLASM OF BREAST: ICD-10-CM

## 2025-07-11 DIAGNOSIS — I48.0 PAROXYSMAL ATRIAL FIBRILLATION (HCC): ICD-10-CM

## 2025-07-11 PROBLEM — Z00.00 ANNUAL PHYSICAL EXAM: Status: RESOLVED | Noted: 2020-02-21 | Resolved: 2025-07-11

## 2025-07-11 PROCEDURE — 77067 SCR MAMMO BI INCL CAD: CPT

## 2025-07-11 PROCEDURE — 93000 ELECTROCARDIOGRAM COMPLETE: CPT | Performed by: INTERNAL MEDICINE

## 2025-07-11 PROCEDURE — 99245 OFF/OP CONSLTJ NEW/EST HI 55: CPT | Performed by: INTERNAL MEDICINE

## 2025-07-11 PROCEDURE — 77063 BREAST TOMOSYNTHESIS BI: CPT

## 2025-07-11 NOTE — PROGRESS NOTES
Consultation - Electrophysiology-Cardiology (EP)   Sarah Calhoun 64 y.o. female MRN: [id number]  Unit/Bed#:  Encounter: [id number]      1. Atrial fibrillation with RVR (HCC)  Ambulatory Referral to Cardiac Electrophysiology    POCT ECG      2. Atrial flutter, unspecified type (HCC)  Ambulatory Referral to Cardiac Electrophysiology    POCT ECG      3. Paroxysmal atrial fibrillation (HCC)        4. Essential hypertension        5. Suspected ALISON        6. Impaired fasting glucose        7. S/P laparoscopic hysterectomy        8. Morbid obesity (HCC)              Consults  Physician Requesting Consult: Justine Braxton CRNP   Reason for Consult / Principal Problem: A fib and A flutter         Summary of my recommendation for the patient  Patient has both atrial fibrillation and flutter as documented in electrograms below    Trial of flecainide-Flecainide toxicity with QRS widening  Has been changed over to amiodarone currently    Current regimen include  Anticoagulation-Xarelto  Rate controlled-metoprolol  Rhythm control-amiodarone    We went over risks and benefits of amiodarone-chances of retinal and corneal deposits, optic neuritis, hypo and hyperthyroidism, interstitial lung disease, liver deposit and cirrhosis, photosensitivity and skin pigmentation, basal ganglia deposits    Patient's risk factors include -age 64, BMI 38, sleep apnea, hypertension, prediabetes,    Went over mechanism of atrial flutter and atrial fibrillation  Went over risk factors of both atrial arrhythmias    Patient wants to proceed with comprehensive ablation of atrial fibrillation and flutter    Summary of my recommendation for the patient  - PFA ablation of both atrial fibrillation and flutter  - CT pulmonary veins   - FLORENTIN prior to the procedure  - Hold Xarelto the morning of the procedure  - NavX    Went over risks and benefits of the procedure  There is a small but definite risk of bleeding in the groin, bleeding around the heart, heart  attack, stroke, pulmonary vein stenosis, need for open heart surgery        Clinical conditions  Atrial fibrillation  Atrial flutter post cardioversion  Long-term anticoagulation with Xarelto  Flecainide toxicity with QRS widening  Hypertension  Impaired fasting glucose with hemoglobin A1c 6.4  Morbid obesity with BMI 38  Suspected sleep apnea              Assessment & Plan     Atrial fibrillation  Atrial flutter post cardioversion  Long-term anticoagulation with Xarelto  Flecainide toxicity with QRS widening    Patient has both atrial fibrillation and flutter as documented in electrograms below    Trial of flecainide-Flecainide toxicity with QRS widening  Has been changed over to amiodarone currently    Current regimen include  Anticoagulation-Xarelto  Rate controlled-metoprolol  Rhythm control-amiodarone    We went over risks and benefits of amiodarone-chances of retinal and corneal deposits, optic neuritis, hypo and hyperthyroidism, interstitial lung disease, liver deposit and cirrhosis, photosensitivity and skin pigmentation, basal ganglia deposits    Patient's risk factors include -age 64, BMI 38, sleep apnea, hypertension, prediabetes,    Went over mechanism of atrial flutter and atrial fibrillation  Went over risk factors of both atrial arrhythmias    Patient wants to proceed with comprehensive ablation of atrial fibrillation and flutter    Summary of my recommendation for the patient  - PFA ablation of both atrial fibrillation and flutter  - CT pulmonary veins   - FLORENTIN prior to the procedure  - Hold Xarelto the morning of the procedure  - NavX    Went over risks and benefits of the procedure  There is a small but definite risk of bleeding in the groin, bleeding around the heart, heart attack, stroke, pulmonary vein stenosis, need for open heart surgery    Atrial fibrillation, May 2025                      Atrial flutter        Flecainide toxicity-QRS widening            Hypertension  Blood pressure  122/84  Patient is on losartan, metoprolol  Continue with same      Impaired fasting glucose with hemoglobin A1c 6.4      Morbid obesity with BMI 38  Recommend dietary changes  Need to follow-up with primary/endocrine for consideration of GLP-1 agonist      Suspected sleep apnea  Evaluate and treat        History of Present Illness   HPI: Sarah Calhoun is a 64 y.o. year old female has been referred to me by Dr Brooks for the evaluation and management of A-fib and flutter    The patient has significant medical illnesses which include  Atrial fibrillation  Atrial flutter post cardioversion  Long-term anticoagulation with Xarelto  Flecainide toxicity with QRS widening  Hypertension  Impaired fasting glucose with hemoglobin A1c 6.4  Morbid obesity with BMI 38  Suspected sleep apnea    Patient is not complaining of anginal-like chest pain, orthopnea or PND  She does get occasional leg swelling  She does feel the palpitation when she is in abnormal rhythm  She is not complaining of presyncope or syncope  She does have exertional intolerance when she is having palpitation    She does have a history of occasional morning fatigue and daytime sleepiness    She is not abusing tobacco alcohol or energy drinks at the current time          Historical Information   Past Medical History[1]  Past Surgical History[2]  Social History     Substance and Sexual Activity   Alcohol Use No    Comment: .     Social History     Substance and Sexual Activity   Drug Use No     Tobacco Use History[3]  Social History     Socioeconomic History    Marital status: /Civil Union     Spouse name: Not on file    Number of children: Not on file    Years of education: Not on file    Highest education level: Not on file   Occupational History    Occupation: Neuro ass    Tobacco Use    Smoking status: Never    Smokeless tobacco: Never    Tobacco comments:     Former   Vaping Use    Vaping status: Never Used   Substance and Sexual  "Activity    Alcohol use: No     Comment: .    Drug use: No    Sexual activity: Not Currently   Other Topics Concern    Not on file   Social History Narrative        No caffeine use    Seeing a dentist     Social Drivers of Health     Financial Resource Strain: Not on file   Food Insecurity: No Food Insecurity (6/13/2025)    Nursing - Inadequate Food Risk Classification     Worried About Running Out of Food in the Last Year: Not on file     Ran Out of Food in the Last Year: Not on file     Ran Out of Food in the Last Year: Never true   Transportation Needs: No Transportation Needs (6/13/2025)    Nursing - Transportation Risk Classification     Lack of Transportation: Not on file     Lack of Transportation: No   Physical Activity: Not on file   Stress: Not on file   Social Connections: Not on file   Intimate Partner Violence: Unknown (6/13/2025)    Nursing IPS     Feels Physically and Emotionally Safe: Not on file     Physically Hurt by Someone: Not on file     Humiliated or Emotionally Abused by Someone: Not on file     Physically Hurt by Someone: No     Hurt or Threatened by Someone: No   Housing Stability: Unknown (6/13/2025)    Nursing: Inadequate Housing Risk Classification     Has Housing: Not on file     Worried About Losing Housing: Not on file     Unable to Get Utilities: Not on file     Unable to Pay for Housing in the Last Year: No     Has Housing: No     .  Family History:Family History[4]      Meds/Allergies    No current facility-administered medications for this visit.     Not in a hospital admission.    Allergies[5]        Objective   Vitals: Visit Vitals  /84 (BP Location: Left arm, Patient Position: Sitting, Cuff Size: Large)   Pulse 63   Ht 5' 4\" (1.626 m)   Wt 101 kg (221 lb 9.6 oz)   BMI 38.04 kg/m²   OB Status Hysterectomy   Smoking Status Never   BSA 2.05 m²      Vitals:    07/11/25 1014   Weight: 101 kg (221 lb 9.6 oz)   [unfilled]    Invasive Devices       None             "         ROS  Review of Systems   All other systems reviewed and are negative.  As described in my history of present illness        PHYSICAL EXAM  Physical Exam  Vitals reviewed.   Constitutional:       General: She is not in acute distress.     Appearance: Normal appearance. She is obese. She is not ill-appearing.   HENT:      Head: Normocephalic and atraumatic.      Right Ear: External ear normal.      Left Ear: External ear normal.      Nose: Nose normal.      Mouth/Throat:      Comments: Posterior pharynx is crowded    Eyes:      General: No scleral icterus.     Extraocular Movements: Extraocular movements intact.      Conjunctiva/sclera: Conjunctivae normal.      Pupils: Pupils are equal, round, and reactive to light.     Neck:      Comments: Short thick neck  Cardiovascular:      Rate and Rhythm: Normal rate and regular rhythm.      Pulses: Normal pulses.      Heart sounds: Normal heart sounds. No murmur heard.  Pulmonary:      Effort: Pulmonary effort is normal. No respiratory distress.      Breath sounds: Normal breath sounds. No wheezing.   Abdominal:      General: Bowel sounds are normal. There is no distension.      Tenderness: There is no abdominal tenderness.      Comments: Central obesity present     Musculoskeletal:         General: No swelling, tenderness or deformity.      Cervical back: No rigidity.     Skin:     Coloration: Skin is not jaundiced.      Findings: No bruising.     Neurological:      Mental Status: She is alert and oriented to person, place, and time. Mental status is at baseline.      Motor: No weakness.     Psychiatric:         Mood and Affect: Mood normal.         Behavior: Behavior normal.         Thought Content: Thought content normal.         Judgment: Judgment normal.               LAB RESULTS:    CBC:  WBC   Date Value Ref Range Status   06/17/2025 11.05 (H) 4.31 - 10.16 Thousand/uL Final   10/26/2015 7.89 4.31 - 10.16 Thousand/uL Final     Hemoglobin   Date Value Ref Range  Status   06/17/2025 13.5 11.5 - 15.4 g/dL Final   10/26/2015 14.3 11.5 - 15.4 g/dL Final     Hematocrit   Date Value Ref Range Status   06/17/2025 41.8 34.8 - 46.1 % Final   10/26/2015 42.7 34.8 - 46.1 % Final     MCV   Date Value Ref Range Status   06/17/2025 84 82 - 98 fL Final   10/26/2015 84 82 - 98 fL Final     Platelets   Date Value Ref Range Status   06/17/2025 247 149 - 390 Thousands/uL Final   10/26/2015 246 149 - 390 Thousand/uL Final     RBC   Date Value Ref Range Status   06/17/2025 4.96 3.81 - 5.12 Million/uL Final   10/26/2015 5.10 3.81 - 5.12 Million/uL Final     MCH   Date Value Ref Range Status   06/17/2025 27.2 26.8 - 34.3 pg Final   10/26/2015 28.0 26.8 - 34.3 pg Final     MCHC   Date Value Ref Range Status   06/17/2025 32.3 31.4 - 37.4 g/dL Final   10/26/2015 33.5 31.4 - 37.4 g/dL Final     RDW   Date Value Ref Range Status   06/17/2025 13.7 11.6 - 15.1 % Final   10/26/2015 13.3 11.6 - 15.1 % Final     MPV   Date Value Ref Range Status   06/17/2025 10.0 8.9 - 12.7 fL Final   10/26/2015 10.1 8.9 - 12.7 fL Final     nRBC   Date Value Ref Range Status   06/17/2025 0 /100 WBCs Final       CMP:  Sodium   Date Value Ref Range Status   10/26/2015 141 136 - 145 mmol/L Final     Potassium   Date Value Ref Range Status   06/17/2025 4.2 3.5 - 5.3 mmol/L Final   01/05/2019 4.9 3.5 - 5.2 mmol/L Final     Chloride   Date Value Ref Range Status   06/17/2025 107 96 - 108 mmol/L Final   01/05/2019 106 100 - 109 mmol/L Final     Carbon Dioxide   Date Value Ref Range Status   01/05/2019 28 23 - 31 mmol/L Final     CO2   Date Value Ref Range Status   06/17/2025 24 21 - 32 mmol/L Final     Anion Gap   Date Value Ref Range Status   10/26/2015 8 4 - 13 mmol/L Final     BUN   Date Value Ref Range Status   06/17/2025 20 5 - 25 mg/dL Final   01/05/2019 17 7 - 25 mg/dL Final     Creatinine   Date Value Ref Range Status   06/17/2025 1.08 0.60 - 1.30 mg/dL Final     Comment:     Standardized to IDMS reference method    01/05/2019 1.03 0.40 - 1.10 mg/dL Final     Glucose   Date Value Ref Range Status   10/26/2015 90 65 - 140 mg/dL Final     Comment:     If patient is fasting, the ADA then defines impaired fasting glucose as  >100 mg/dl and diabetes as  >or equal to 126 mg/dl.       Calcium   Date Value Ref Range Status   06/17/2025 9.5 8.4 - 10.2 mg/dL Final   01/05/2019 9.0 8.5 - 10.1 mg/dL Final     AST   Date Value Ref Range Status   06/17/2025 20 13 - 39 U/L Final   01/05/2019 25 <41 U/L Final     ALT   Date Value Ref Range Status   06/17/2025 24 7 - 52 U/L Final     Comment:     Specimen collection should occur prior to Sulfasalazine administration due to the potential for falsely depressed results.    01/05/2019 39 <56 U/L Final     Alkaline Phosphatase   Date Value Ref Range Status   06/17/2025 93 34 - 104 U/L Final   01/05/2019 122 (H) 35 - 120 U/L Final     Total Protein   Date Value Ref Range Status   10/26/2015 7.5 6.4 - 8.2 g/dL Final     Total Bilirubin   Date Value Ref Range Status   10/26/2015 0.42 0.20 - 1.00 mg/dL Final     GFR, Calculated   Date Value Ref Range Status   01/05/2019 60 (L) >60 mL/min/1.73m2 Final     Comment:     mL/min per 1.73 square meters                                            Normal Function or Mild Renal    Disease (if clinically at risk):  >or=60  Moderately Decreased:                30-59  Severely Decreased:                  15-29  Renal Failure:                         <15                                            -American GFR: multiply reported GFR by 1.16    Please note that the eGFR is based on the CKD-EPI calculation, and is not intended to be used for drug dosing.                                            Note: Calculated GFR may not be an accurate indicator of renal function if the patient's renal function is not in a steady state.     eGFR   Date Value Ref Range Status   06/17/2025 53 ml/min/1.73sq m Final        Magnesium:   Magnesium   Date Value Ref Range Status  "  06/13/2025 2.2 1.9 - 2.7 mg/dL Final        A1C:  Hemoglobin A1C   Date Value Ref Range Status   11/18/2024 6.4 (H) Normal 4.0-5.6%; PreDiabetic 5.7-6.4%; Diabetic >=6.5%; Glycemic control for adults with diabetes <7.0% % Final   01/05/2019 6.1 (H) <5.7 % Final     Comment:     Reference Range  Non-diabetic                     <5.7  Pre-diabetic                     5.7-6.4  Diabetic                         >=6.5  ADA target for diabetic control  <=7        TSH:  TSH   Date Value Ref Range Status   10/16/2020 1.39 0.36 - 3.74 uIU/mL Final     TSH 3RD GENERATON   Date Value Ref Range Status   10/26/2015 1.570 0.358 - 3.740 uIU/ML Final     Comment:     *Patients undergoing fluorescein dye angiography may retain small  amounts of fluorescein in the body for 48-72 hours post procedure.  Samples containing fluorescein can produce falsely depressed TSH   values. If the patient had this procedure, a specimen should be  resubmitted post fluorescein clearance.  The above 1 analytes were performed by [address] [id number]       TSH 3RD GENERATION   Date Value Ref Range Status   06/13/2025 1.450 0.450 - 4.500 uIU/mL Final     Comment:     The recommended reference ranges for TSH during pregnancy are as follows:   First trimester 0.100 to 2.500 uIU/mL   Second trimester  0.200 to 3.000 uIU/mL   Third trimester 0.300 to 3.000 uIU/m    Note: Normal ranges may not apply to patients who are transgender, non-binary, or whose legal sex, sex at birth, and gender identity differ.  Adult TSH (3rd generation) reference range follows the recommended guidelines of the American Thyroid Association, January, 2020.        PT/INR:  No results found for: \"PROTIME\", \"INR\"    Lipid Panel:  Cholesterol   Date Value Ref Range Status   11/18/2024 175 See Comment mg/dL Final     Comment:     Cholesterol:         Pediatric <18 Years        Desirable          <170 mg/dL      Borderline High    170-199 mg/dL      High         " "      >=200 mg/dL        Adult >=18 Years            Desirable        <200 mg/dL      Borderline High  200-239 mg/dL      High             >239 mg/dL       Triglycerides   Date Value Ref Range Status   11/18/2024 109 See Comment mg/dL Final     Comment:     Triglyceride:     0-9Y            <75mg/dL     10Y-17Y         <90 mg/dL       >=18Y     Normal          <150 mg/dL     Borderline High 150-199 mg/dL     High            200-499 mg/dL        Very High       >499 mg/dL    Specimen collection should occur prior to Metamizole administration due to the potential for falsely depressed results.   10/26/2015 124 mg/dL Final     Comment:     TRIGLYCERIDE:       Normal              <150 mg/dl       Borderline High    150-199 mg/dl       High               200-499 mg/dl       Very High          >499 mg/dl  _______________________________________       HDL   Date Value Ref Range Status   10/26/2015 41 mg/dL Final     Comment:     HDL:       High       >59 mg/dl       Low        <41 mg/dl  ______________________________       HDL, Direct   Date Value Ref Range Status   11/18/2024 46 (L) >=50 mg/dL Final       Troponin:  No results found for: \"TROPONINI\"      Imaging:    EKG:    (Most recent date)  (Add EKG image)      FLORENTIN:  No results found for this or any previous visit.      Echocardiogram:  Results for orders placed during the hospital encounter of 04/29/25    Echo complete w/ contrast if indicated    Interpretation Summary    Left Ventricle: Left ventricular cavity size is normal. Wall thickness is normal. The left ventricular ejection fraction is 60%. Systolic function is normal. Wall motion is normal. Diastolic function is normal.    Right Ventricle: Right ventricular cavity size is normal. Systolic function is normal.    Mitral Valve: There is mild annular calcification. There is trace regurgitation.    Tricuspid Valve: There is trace regurgitation. There is no indirect evidence of pulmonary hypertension.    IVC/SVC: " The right atrial pressure is estimated at 3.0 mmHg. The inferior vena cava is normal in size. Respirophasic changes were normal.    No results found for this or any previous visit.      Stress Test:   No results found for this or any previous visit.    No results found for this or any previous visit.      Cardiac Catheterization:  No results found for this or any previous visit.      HOLTER MONITOR: 24 HOUR/48 HOUR MONITORS  No results found for this or any previous visit.      AMB Extended Holter Monitor: Zio XT/AT or BioTel  No results found for this or any previous visit.        DEVICE CHECK:     No results found for this or any previous visit.         Code Status: [unfilled]  Advance Directive and Living Will:      Power of :    POLST:      Counseling / Coordination of Care  Detailed risks and benefits of ablation discussed with the patient   proceed with ablation      Dax Burch MD         [1]   Past Medical History:  Diagnosis Date    Atrial fibrillation (HCC) 2021    Hypertension     Impaired fasting glucose     Pain     Paroxysmal atrial fibrillation (HCC)     Suspected ALISON    [2]   Past Surgical History:  Procedure Laterality Date    HYSTERECTOMY  12/23/2006    OOPHORECTOMY Right     unilateral    OTHER SURGICAL HISTORY      Excision of lesion Genitalia Bengin last assessed 11/01/16    AK COLONOSCOPY FLX DX W/COLLJ SPEC WHEN PFRMD N/A 7/21/2016    Procedure: COLONOSCOPY;  Surgeon: Forrest Blandon MD;  Location: [facility];  Service: Gastroenterology    TUBAL LIGATION     [3]   Social History  Tobacco Use   Smoking Status Never   Smokeless Tobacco Never   Tobacco Comments    Former   [4]   Family History  Problem Relation Name Age of Onset    COPD Mother Mom     Heart failure Mother Mom     Hyperlipidemia Mother Mom     Hypertension Mother Mom     Throat cancer Mother Mom         cause of death    COPD Father          moderate    No Known Problems Sister      No Known Problems Maternal  Grandmother      Breast cancer Paternal Grandmother Grandmother 70    Cancer Maternal Aunt Luisana         Urinary bladder    No Known Problems Maternal Aunt     [5]   Allergies  Allergen Reactions    Erythromycin     Sulfa Antibiotics     Tetracycline

## 2025-07-11 NOTE — LETTER
July 11, 2025     Jenna Knapp PA-C  [address]   [address]  [facility] [address]    Patient: Sarah Calhoun   YOB: 1960   Date of Visit: 7/11/2025       Dear HAYLEY Sin CRNP  Sarah REYES Lj Brooks MD  CARDIOLOGY SURGERY COORDINATOR:    Thank you for referring Sarah Calhoun to me for evaluation. Below are my notes for this consultation.    If you have questions, please do not hesitate to call me. I look forward to following your patient along with you.         Sincerely,        Dax Burch MD        CC: MATTI Blanton  Sarah LJames Lj Brooks MD  CARDIOLOGY SURGERY COORDINATOR    Dax Burch MD  7/11/2025  1:46 PM  Sign when Signing Visit   Consultation - Electrophysiology-Cardiology (EP)   Sarah Calhoun 64 y.o. female MRN: [id number]  Unit/Bed#:  Encounter: [id number]      1. Atrial fibrillation with RVR (HCC)  Ambulatory Referral to Cardiac Electrophysiology    POCT ECG      2. Atrial flutter, unspecified type (HCC)  Ambulatory Referral to Cardiac Electrophysiology    POCT ECG      3. Paroxysmal atrial fibrillation (HCC)        4. Essential hypertension        5. Suspected ALISON        6. Impaired fasting glucose        7. S/P laparoscopic hysterectomy        8. Morbid obesity (HCC)              Consults  Physician Requesting Consult: Justine Braxton CRNP   Reason for Consult / Principal Problem: A fib and A flutter         Summary of my recommendation for the patient  Patient has both atrial fibrillation and flutter as documented in electrograms below    Trial of flecainide-Flecainide toxicity with QRS widening  Has been changed over to amiodarone currently    Current regimen include  Anticoagulation-Xarelto  Rate controlled-metoprolol  Rhythm control-amiodarone    We went over risks and benefits of amiodarone-chances of retinal and corneal deposits, optic neuritis, hypo and hyperthyroidism,  interstitial lung disease, liver deposit and cirrhosis, photosensitivity and skin pigmentation, basal ganglia deposits    Patient's risk factors include -age 64, BMI 38, sleep apnea, hypertension, prediabetes,    Went over mechanism of atrial flutter and atrial fibrillation  Went over risk factors of both atrial arrhythmias    Patient wants to proceed with comprehensive ablation of atrial fibrillation and flutter    Summary of my recommendation for the patient  - PFA ablation of both atrial fibrillation and flutter  - CT pulmonary veins   - FLORENTIN prior to the procedure  - Hold Xarelto the morning of the procedure  - NavX    Went over risks and benefits of the procedure  There is a small but definite risk of bleeding in the groin, bleeding around the heart, heart attack, stroke, pulmonary vein stenosis, need for open heart surgery        Clinical conditions  Atrial fibrillation  Atrial flutter post cardioversion  Long-term anticoagulation with Xarelto  Flecainide toxicity with QRS widening  Hypertension  Impaired fasting glucose with hemoglobin A1c 6.4  Morbid obesity with BMI 38  Suspected sleep apnea              Assessment & Plan    Atrial fibrillation  Atrial flutter post cardioversion  Long-term anticoagulation with Xarelto  Flecainide toxicity with QRS widening    Patient has both atrial fibrillation and flutter as documented in electrograms below    Trial of flecainide-Flecainide toxicity with QRS widening  Has been changed over to amiodarone currently    Current regimen include  Anticoagulation-Xarelto  Rate controlled-metoprolol  Rhythm control-amiodarone    We went over risks and benefits of amiodarone-chances of retinal and corneal deposits, optic neuritis, hypo and hyperthyroidism, interstitial lung disease, liver deposit and cirrhosis, photosensitivity and skin pigmentation, basal ganglia deposits    Patient's risk factors include -age 64, BMI 38, sleep apnea, hypertension, prediabetes,    Went over  mechanism of atrial flutter and atrial fibrillation  Went over risk factors of both atrial arrhythmias    Patient wants to proceed with comprehensive ablation of atrial fibrillation and flutter    Summary of my recommendation for the patient  - PFA ablation of both atrial fibrillation and flutter  - CT pulmonary veins   - FLORENTIN prior to the procedure  - Hold Xarelto the morning of the procedure  - NavX    Went over risks and benefits of the procedure  There is a small but definite risk of bleeding in the groin, bleeding around the heart, heart attack, stroke, pulmonary vein stenosis, need for open heart surgery    Atrial fibrillation, May 2025                      Atrial flutter        Flecainide toxicity-QRS widening            Hypertension  Blood pressure 122/84  Patient is on losartan, metoprolol  Continue with same      Impaired fasting glucose with hemoglobin A1c 6.4      Morbid obesity with BMI 38  Recommend dietary changes  Need to follow-up with primary/endocrine for consideration of GLP-1 agonist      Suspected sleep apnea  Evaluate and treat        History of Present Illness  HPI: Sarah Calhoun is a 64 y.o. year old female has been referred to me by Dr Brooks for the evaluation and management of A-fib and flutter    The patient has significant medical illnesses which include  Atrial fibrillation  Atrial flutter post cardioversion  Long-term anticoagulation with Xarelto  Flecainide toxicity with QRS widening  Hypertension  Impaired fasting glucose with hemoglobin A1c 6.4  Morbid obesity with BMI 38  Suspected sleep apnea    Patient is not complaining of anginal-like chest pain, orthopnea or PND  She does get occasional leg swelling  She does feel the palpitation when she is in abnormal rhythm  She is not complaining of presyncope or syncope  She does have exertional intolerance when she is having palpitation    She does have a history of occasional morning fatigue and daytime sleepiness    She is not  abusing tobacco alcohol or energy drinks at the current time          Historical Information  Past Medical History[1]  Past Surgical History[2]  Social History     Substance and Sexual Activity   Alcohol Use No    Comment: .     Social History     Substance and Sexual Activity   Drug Use No     Tobacco Use History[3]  Social History     Socioeconomic History   • Marital status: /Civil Union     Spouse name: Not on file   • Number of children: Not on file   • Years of education: Not on file   • Highest education level: Not on file   Occupational History   • Occupation: Neuro ass    Tobacco Use   • Smoking status: Never   • Smokeless tobacco: Never   • Tobacco comments:     Former   Vaping Use   • Vaping status: Never Used   Substance and Sexual Activity   • Alcohol use: No     Comment: .   • Drug use: No   • Sexual activity: Not Currently   Other Topics Concern   • Not on file   Social History Narrative        No caffeine use    Seeing a dentist     Social Drivers of Health     Financial Resource Strain: Not on file   Food Insecurity: No Food Insecurity (6/13/2025)    Nursing - Inadequate Food Risk Classification    • Worried About Running Out of Food in the Last Year: Not on file    • Ran Out of Food in the Last Year: Not on file    • Ran Out of Food in the Last Year: Never true   Transportation Needs: No Transportation Needs (6/13/2025)    Nursing - Transportation Risk Classification    • Lack of Transportation: Not on file    • Lack of Transportation: No   Physical Activity: Not on file   Stress: Not on file   Social Connections: Not on file   Intimate Partner Violence: Unknown (6/13/2025)    Nursing IPS    • Feels Physically and Emotionally Safe: Not on file    • Physically Hurt by Someone: Not on file    • Humiliated or Emotionally Abused by Someone: Not on file    • Physically Hurt by Someone: No    • Hurt or Threatened by Someone: No   Housing Stability: Unknown (6/13/2025)    Nursing:  "Inadequate Housing Risk Classification    • Has Housing: Not on file    • Worried About Losing Housing: Not on file    • Unable to Get Utilities: Not on file    • Unable to Pay for Housing in the Last Year: No    • Has Housing: No     .  Family History:Family History[4]      Meds/Allergies   No current facility-administered medications for this visit.     Not in a hospital admission.    Allergies[5]        Objective  Vitals: Visit Vitals  /84 (BP Location: Left arm, Patient Position: Sitting, Cuff Size: Large)   Pulse 63   Ht 5' 4\" (1.626 m)   Wt 101 kg (221 lb 9.6 oz)   BMI 38.04 kg/m²   OB Status Hysterectomy   Smoking Status Never   BSA 2.05 m²      Vitals:    07/11/25 1014   Weight: 101 kg (221 lb 9.6 oz)   [unfilled]    Invasive Devices       None                     ROS  Review of Systems   All other systems reviewed and are negative.  As described in my history of present illness        PHYSICAL EXAM  Physical Exam  Vitals reviewed.   Constitutional:       General: She is not in acute distress.     Appearance: Normal appearance. She is obese. She is not ill-appearing.   HENT:      Head: Normocephalic and atraumatic.      Right Ear: External ear normal.      Left Ear: External ear normal.      Nose: Nose normal.      Mouth/Throat:      Comments: Posterior pharynx is crowded    Eyes:      General: No scleral icterus.     Extraocular Movements: Extraocular movements intact.      Conjunctiva/sclera: Conjunctivae normal.      Pupils: Pupils are equal, round, and reactive to light.     Neck:      Comments: Short thick neck  Cardiovascular:      Rate and Rhythm: Normal rate and regular rhythm.      Pulses: Normal pulses.      Heart sounds: Normal heart sounds. No murmur heard.  Pulmonary:      Effort: Pulmonary effort is normal. No respiratory distress.      Breath sounds: Normal breath sounds. No wheezing.   Abdominal:      General: Bowel sounds are normal. There is no distension.      Tenderness: There is no " abdominal tenderness.      Comments: Central obesity present     Musculoskeletal:         General: No swelling, tenderness or deformity.      Cervical back: No rigidity.     Skin:     Coloration: Skin is not jaundiced.      Findings: No bruising.     Neurological:      Mental Status: She is alert and oriented to person, place, and time. Mental status is at baseline.      Motor: No weakness.     Psychiatric:         Mood and Affect: Mood normal.         Behavior: Behavior normal.         Thought Content: Thought content normal.         Judgment: Judgment normal.               LAB RESULTS:    CBC:  WBC   Date Value Ref Range Status   06/17/2025 11.05 (H) 4.31 - 10.16 Thousand/uL Final   10/26/2015 7.89 4.31 - 10.16 Thousand/uL Final     Hemoglobin   Date Value Ref Range Status   06/17/2025 13.5 11.5 - 15.4 g/dL Final   10/26/2015 14.3 11.5 - 15.4 g/dL Final     Hematocrit   Date Value Ref Range Status   06/17/2025 41.8 34.8 - 46.1 % Final   10/26/2015 42.7 34.8 - 46.1 % Final     MCV   Date Value Ref Range Status   06/17/2025 84 82 - 98 fL Final   10/26/2015 84 82 - 98 fL Final     Platelets   Date Value Ref Range Status   06/17/2025 247 149 - 390 Thousands/uL Final   10/26/2015 246 149 - 390 Thousand/uL Final     RBC   Date Value Ref Range Status   06/17/2025 4.96 3.81 - 5.12 Million/uL Final   10/26/2015 5.10 3.81 - 5.12 Million/uL Final     MCH   Date Value Ref Range Status   06/17/2025 27.2 26.8 - 34.3 pg Final   10/26/2015 28.0 26.8 - 34.3 pg Final     MCHC   Date Value Ref Range Status   06/17/2025 32.3 31.4 - 37.4 g/dL Final   10/26/2015 33.5 31.4 - 37.4 g/dL Final     RDW   Date Value Ref Range Status   06/17/2025 13.7 11.6 - 15.1 % Final   10/26/2015 13.3 11.6 - 15.1 % Final     MPV   Date Value Ref Range Status   06/17/2025 10.0 8.9 - 12.7 fL Final   10/26/2015 10.1 8.9 - 12.7 fL Final     nRBC   Date Value Ref Range Status   06/17/2025 0 /100 WBCs Final       CMP:  Sodium   Date Value Ref Range Status    10/26/2015 141 136 - 145 mmol/L Final     Potassium   Date Value Ref Range Status   06/17/2025 4.2 3.5 - 5.3 mmol/L Final   01/05/2019 4.9 3.5 - 5.2 mmol/L Final     Chloride   Date Value Ref Range Status   06/17/2025 107 96 - 108 mmol/L Final   01/05/2019 106 100 - 109 mmol/L Final     Carbon Dioxide   Date Value Ref Range Status   01/05/2019 28 23 - 31 mmol/L Final     CO2   Date Value Ref Range Status   06/17/2025 24 21 - 32 mmol/L Final     Anion Gap   Date Value Ref Range Status   10/26/2015 8 4 - 13 mmol/L Final     BUN   Date Value Ref Range Status   06/17/2025 20 5 - 25 mg/dL Final   01/05/2019 17 7 - 25 mg/dL Final     Creatinine   Date Value Ref Range Status   06/17/2025 1.08 0.60 - 1.30 mg/dL Final     Comment:     Standardized to IDMS reference method   01/05/2019 1.03 0.40 - 1.10 mg/dL Final     Glucose   Date Value Ref Range Status   10/26/2015 90 65 - 140 mg/dL Final     Comment:     If patient is fasting, the ADA then defines impaired fasting glucose as  >100 mg/dl and diabetes as  >or equal to 126 mg/dl.       Calcium   Date Value Ref Range Status   06/17/2025 9.5 8.4 - 10.2 mg/dL Final   01/05/2019 9.0 8.5 - 10.1 mg/dL Final     AST   Date Value Ref Range Status   06/17/2025 20 13 - 39 U/L Final   01/05/2019 25 <41 U/L Final     ALT   Date Value Ref Range Status   06/17/2025 24 7 - 52 U/L Final     Comment:     Specimen collection should occur prior to Sulfasalazine administration due to the potential for falsely depressed results.    01/05/2019 39 <56 U/L Final     Alkaline Phosphatase   Date Value Ref Range Status   06/17/2025 93 34 - 104 U/L Final   01/05/2019 122 (H) 35 - 120 U/L Final     Total Protein   Date Value Ref Range Status   10/26/2015 7.5 6.4 - 8.2 g/dL Final     Total Bilirubin   Date Value Ref Range Status   10/26/2015 0.42 0.20 - 1.00 mg/dL Final     GFR, Calculated   Date Value Ref Range Status   01/05/2019 60 (L) >60 mL/min/1.73m2 Final     Comment:     mL/min per 1.73 square  meters                                            Normal Function or Mild Renal    Disease (if clinically at risk):  >or=60  Moderately Decreased:                30-59  Severely Decreased:                  15-29  Renal Failure:                         <15                                            -American GFR: multiply reported GFR by 1.16    Please note that the eGFR is based on the CKD-EPI calculation, and is not intended to be used for drug dosing.                                            Note: Calculated GFR may not be an accurate indicator of renal function if the patient's renal function is not in a steady state.     eGFR   Date Value Ref Range Status   06/17/2025 53 ml/min/1.73sq m Final        Magnesium:   Magnesium   Date Value Ref Range Status   06/13/2025 2.2 1.9 - 2.7 mg/dL Final        A1C:  Hemoglobin A1C   Date Value Ref Range Status   11/18/2024 6.4 (H) Normal 4.0-5.6%; PreDiabetic 5.7-6.4%; Diabetic >=6.5%; Glycemic control for adults with diabetes <7.0% % Final   01/05/2019 6.1 (H) <5.7 % Final     Comment:     Reference Range  Non-diabetic                     <5.7  Pre-diabetic                     5.7-6.4  Diabetic                         >=6.5  ADA target for diabetic control  <=7        TSH:  TSH   Date Value Ref Range Status   10/16/2020 1.39 0.36 - 3.74 uIU/mL Final     TSH 3RD GENERATON   Date Value Ref Range Status   10/26/2015 1.570 0.358 - 3.740 uIU/ML Final     Comment:     *Patients undergoing fluorescein dye angiography may retain small  amounts of fluorescein in the body for 48-72 hours post procedure.  Samples containing fluorescein can produce falsely depressed TSH   values. If the patient had this procedure, a specimen should be  resubmitted post fluorescein clearance.  The above 1 analytes were performed by [address],[place] [address]       TSH 3RD GENERATION   Date Value Ref Range Status   06/13/2025 1.450 0.450 - 4.500 uIU/mL Final     Comment:      "The recommended reference ranges for TSH during pregnancy are as follows:   First trimester 0.100 to 2.500 uIU/mL   Second trimester  0.200 to 3.000 uIU/mL   Third trimester 0.300 to 3.000 uIU/m    Note: Normal ranges may not apply to patients who are transgender, non-binary, or whose legal sex, sex at birth, and gender identity differ.  Adult TSH (3rd generation) reference range follows the recommended guidelines of the American Thyroid Association, January, 2020.        PT/INR:  No results found for: \"PROTIME\", \"INR\"    Lipid Panel:  Cholesterol   Date Value Ref Range Status   11/18/2024 175 See Comment mg/dL Final     Comment:     Cholesterol:         Pediatric <18 Years        Desirable          <170 mg/dL      Borderline High    170-199 mg/dL      High               >=200 mg/dL        Adult >=18 Years            Desirable        <200 mg/dL      Borderline High  200-239 mg/dL      High             >239 mg/dL       Triglycerides   Date Value Ref Range Status   11/18/2024 109 See Comment mg/dL Final     Comment:     Triglyceride:     0-9Y            <75mg/dL     10Y-17Y         <90 mg/dL       >=18Y     Normal          <150 mg/dL     Borderline High 150-199 mg/dL     High            200-499 mg/dL        Very High       >499 mg/dL    Specimen collection should occur prior to Metamizole administration due to the potential for falsely depressed results.   10/26/2015 124 mg/dL Final     Comment:     TRIGLYCERIDE:       Normal              <150 mg/dl       Borderline High    150-199 mg/dl       High               200-499 mg/dl       Very High          >499 mg/dl  _______________________________________       HDL   Date Value Ref Range Status   10/26/2015 41 mg/dL Final     Comment:     HDL:       High       >59 mg/dl       Low        <41 mg/dl  ______________________________       HDL, Direct   Date Value Ref Range Status   11/18/2024 46 (L) >=50 mg/dL Final       Troponin:  No results found for: " "\"TROPONINI\"      Imaging:    EKG:    (Most recent date)  (Add EKG image)      FLORENTIN:  No results found for this or any previous visit.      Echocardiogram:  Results for orders placed during the hospital encounter of 04/29/25    Echo complete w/ contrast if indicated    Interpretation Summary  •  Left Ventricle: Left ventricular cavity size is normal. Wall thickness is normal. The left ventricular ejection fraction is 60%. Systolic function is normal. Wall motion is normal. Diastolic function is normal.  •  Right Ventricle: Right ventricular cavity size is normal. Systolic function is normal.  •  Mitral Valve: There is mild annular calcification. There is trace regurgitation.  •  Tricuspid Valve: There is trace regurgitation. There is no indirect evidence of pulmonary hypertension.  •  IVC/SVC: The right atrial pressure is estimated at 3.0 mmHg. The inferior vena cava is normal in size. Respirophasic changes were normal.    No results found for this or any previous visit.      Stress Test:   No results found for this or any previous visit.    No results found for this or any previous visit.      Cardiac Catheterization:  No results found for this or any previous visit.      HOLTER MONITOR: 24 HOUR/48 HOUR MONITORS  No results found for this or any previous visit.      AMB Extended Holter Monitor: Zio XT/AT or BioTel  No results found for this or any previous visit.        DEVICE CHECK:     No results found for this or any previous visit.         Code Status: [unfilled]  Advance Directive and Living Will:      Power of :    POLST:      Counseling / Coordination of Care  Detailed risks and benefits of ablation discussed with the patient   proceed with ablation      Dax Burch MD         [1]   Past Medical History:  Diagnosis Date   • Atrial fibrillation (HCC) 2021   • Hypertension    • Impaired fasting glucose    • Pain    • Paroxysmal atrial fibrillation (HCC)    • Suspected ALISON    [2]   Past Surgical " History:  Procedure Laterality Date   • HYSTERECTOMY  12/23/2006   • OOPHORECTOMY Right     unilateral   • OTHER SURGICAL HISTORY      Excision of lesion Genitalia Meron last assessed 11/01/16   • IA COLONOSCOPY FLX DX W/COLLJ SPEC WHEN PFRMD N/A 7/21/2016    Procedure: COLONOSCOPY;  Surgeon: Forrest Blandon MD;  Location: [facility];  Service: Gastroenterology   • TUBAL LIGATION     [3]   Social History  Tobacco Use   Smoking Status Never   Smokeless Tobacco Never   Tobacco Comments    Former   [4]   Family History  Problem Relation Name Age of Onset   • COPD Mother Mom    • Heart failure Mother Mom    • Hyperlipidemia Mother Mom    • Hypertension Mother Mom    • Throat cancer Mother Mom         cause of death   • COPD Father          moderate   • No Known Problems Sister     • No Known Problems Maternal Grandmother     • Breast cancer Paternal Grandmother Grandmother 70   • Cancer Maternal Aunt Thornton         Urinary bladder   • No Known Problems Maternal Aunt     [5]   Allergies  Allergen Reactions   • Erythromycin    • Sulfa Antibiotics    • Tetracycline

## 2025-07-14 ENCOUNTER — TELEPHONE (OUTPATIENT)
Dept: CARDIOLOGY CLINIC | Facility: CLINIC | Age: 65
End: 2025-07-14

## 2025-07-14 NOTE — TELEPHONE ENCOUNTER
Dr. Downing sent us a referral a few weeks ago to get this pt scheduled. So she is already scheduled with Dr. Downing on 8/5/25. Please advise Dr. Burch.     Thank you,   Lesa

## 2025-07-15 ENCOUNTER — PATIENT MESSAGE (OUTPATIENT)
Dept: CARDIOLOGY CLINIC | Facility: CLINIC | Age: 65
End: 2025-07-15

## 2025-07-16 ENCOUNTER — OFFICE VISIT (OUTPATIENT)
Dept: FAMILY MEDICINE CLINIC | Facility: CLINIC | Age: 65
End: 2025-07-16
Payer: COMMERCIAL

## 2025-07-16 VITALS
HEART RATE: 73 BPM | OXYGEN SATURATION: 96 % | BODY MASS INDEX: 37.56 KG/M2 | TEMPERATURE: 98.1 F | HEIGHT: 64 IN | WEIGHT: 220 LBS | SYSTOLIC BLOOD PRESSURE: 122 MMHG | DIASTOLIC BLOOD PRESSURE: 72 MMHG

## 2025-07-16 DIAGNOSIS — M79.645 CHRONIC THUMB PAIN, BILATERAL: ICD-10-CM

## 2025-07-16 DIAGNOSIS — G89.29 CHRONIC THUMB PAIN, BILATERAL: ICD-10-CM

## 2025-07-16 DIAGNOSIS — I10 ESSENTIAL HYPERTENSION: Primary | ICD-10-CM

## 2025-07-16 DIAGNOSIS — R73.01 IMPAIRED FASTING GLUCOSE: ICD-10-CM

## 2025-07-16 DIAGNOSIS — I48.0 PAROXYSMAL ATRIAL FIBRILLATION (HCC): ICD-10-CM

## 2025-07-16 DIAGNOSIS — G89.29 CHRONIC PAIN OF BOTH KNEES: ICD-10-CM

## 2025-07-16 DIAGNOSIS — K21.00 GASTROESOPHAGEAL REFLUX DISEASE WITH ESOPHAGITIS WITHOUT HEMORRHAGE: ICD-10-CM

## 2025-07-16 DIAGNOSIS — M25.561 CHRONIC PAIN OF BOTH KNEES: ICD-10-CM

## 2025-07-16 DIAGNOSIS — M79.644 CHRONIC THUMB PAIN, BILATERAL: ICD-10-CM

## 2025-07-16 DIAGNOSIS — M25.562 CHRONIC PAIN OF BOTH KNEES: ICD-10-CM

## 2025-07-16 PROCEDURE — 99214 OFFICE O/P EST MOD 30 MIN: CPT | Performed by: PHYSICIAN ASSISTANT

## 2025-07-16 NOTE — ASSESSMENT & PLAN NOTE
Worsening and not relieved by Tylenol.   Orders:  •  XR thumb right first digit-min 2v; Future  •  XR thumb left first digit-min 2v; Future

## 2025-07-16 NOTE — ASSESSMENT & PLAN NOTE
Worsening and not relieved by Tylenol.   Orders:  •  XR knee 3 vw left non injury; Future  •  XR knee 3 vw right non injury; Future

## 2025-07-16 NOTE — PROGRESS NOTES
Name: Sarah Calhoun      : 1960      MRN: [id number]  Encounter Provider: Jenna Knapp PA-C  Encounter Date: 2025   Encounter department: [facility] [address] [facility]  :  Assessment & Plan  Essential hypertension  Controlled with Losartan 100 mg and Toprol  mg daily.  Pt is to continue current medications.        Paroxysmal atrial fibrillation (HCC)  Pt is scheduled for ablation next month.   She is currently on Toprol and amiodarone for rate control.  Pt is on Xarelto also for coverage of clot formation.          Impaired fasting glucose  Last was in range, will check HgbA1c with nest set of labs.   Orders:  •  Hemoglobin A1C; Future    Gastroesophageal reflux disease with esophagitis without hemorrhage  Currently taking omeprazole with relief and will continue        Chronic thumb pain, bilateral  Worsening and not relieved by Tylenol.   Orders:  •  XR thumb right first digit-min 2v; Future  •  XR thumb left first digit-min 2v; Future    Chronic pain of both knees  Worsening and not relieved by Tylenol.   Orders:  •  XR knee 3 vw left non injury; Future  •  XR knee 3 vw right non injury; Future           History of Present Illness   Pt is here for her 4 month follow up. She is to have cardiac ablation for her atrial fibrillation next month. She is feeling well for the most part. She is having worsening pain of both knees and thumbs.      Review of Systems   Constitutional:  Negative for diaphoresis and fatigue.   HENT:  Negative for congestion, mouth sores, nosebleeds, sore throat and trouble swallowing.    Respiratory:  Negative for cough, chest tightness and shortness of breath.    Cardiovascular:  Positive for palpitations. Negative for chest pain and leg swelling.   Gastrointestinal:  Negative for abdominal pain, constipation, diarrhea and nausea.   Endocrine: Negative for polydipsia, polyphagia and polyuria.   Genitourinary:  Negative for  "difficulty urinating and dysuria.   Musculoskeletal:  Positive for arthralgias. Negative for back pain and neck pain.   Skin:  Negative for rash.   Neurological:  Negative for dizziness, syncope, speech difficulty, light-headedness, numbness and headaches.   Psychiatric/Behavioral:  Negative for dysphoric mood, self-injury and suicidal ideas. The patient is not nervous/anxious.        Objective   /72   Pulse 73   Temp 98.1 °F (36.7 °C)   Ht 5' 4\" (1.626 m)   Wt 99.8 kg (220 lb)   SpO2 96%   BMI 37.76 kg/m²      Physical Exam  Vitals and nursing note reviewed.   Constitutional:       Appearance: Normal appearance. She is obese.   HENT:      Head: Normocephalic and atraumatic.      Right Ear: Tympanic membrane, ear canal and external ear normal.      Left Ear: Tympanic membrane, ear canal and external ear normal.      Nose: Nose normal.      Mouth/Throat:      Mouth: Mucous membranes are moist.      Pharynx: Oropharynx is clear.     Eyes:      Extraocular Movements: Extraocular movements intact.      Conjunctiva/sclera: Conjunctivae normal.     Neck:      Thyroid: No thyromegaly.      Vascular: No carotid bruit.     Cardiovascular:      Rate and Rhythm: Normal rate and regular rhythm.      Pulses: Normal pulses.      Heart sounds: Normal heart sounds.   Pulmonary:      Effort: Pulmonary effort is normal.      Breath sounds: Normal breath sounds.   Abdominal:      General: Abdomen is flat. Bowel sounds are normal.      Palpations: Abdomen is soft. There is no hepatomegaly, splenomegaly or mass.      Tenderness: There is no abdominal tenderness. There is no right CVA tenderness or left CVA tenderness.     Musculoskeletal:         General: Normal range of motion.      Cervical back: Normal range of motion and neck supple.      Right lower leg: No edema.      Left lower leg: No edema.   Lymphadenopathy:      Cervical: No cervical adenopathy.     Skin:     General: Skin is warm and dry.     Neurological:      " General: No focal deficit present.      Mental Status: She is alert and oriented to person, place, and time.     Psychiatric:         Mood and Affect: Mood normal.         Behavior: Behavior normal.         Thought Content: Thought content normal.         Judgment: Judgment normal.

## 2025-07-16 NOTE — ASSESSMENT & PLAN NOTE
Pt is scheduled for ablation next month.   She is currently on Toprol and amiodarone for rate control.  Pt is on Xarelto also for coverage of clot formation.

## 2025-07-17 NOTE — ED PROVIDER NOTES
Time reflects when diagnosis was documented in both MDM as applicable and the Disposition within this note       Time User Action Codes Description Comment    6/18/2025 12:35 AM Jesse Freire Add [I48.92] Atrial flutter (HCC)           ED Disposition       ED Disposition   Discharge    Condition   Stable    Date/Time   Wed Jun 18, 2025 12:35 AM    Comment   Sarah Calhoun discharge to home/self care.                   Assessment & Plan       Medical Decision Making  65-year-old female with atrial flutter.  While preparing to administer IV metoprolol, patient spontaneously converted back into NSR with normal rate.  Will continue to monitor, check electrolytes, cardiac labs, reassess.    Amount and/or Complexity of Data Reviewed  Labs: ordered.  Radiology: ordered.             Medications - No data to display    ED Risk Strat Scores                    No data recorded        SBIRT 20yo+      Flowsheet Row Most Recent Value   Initial Alcohol Screen: US AUDIT-C     1. How often do you have a drink containing alcohol? 0 Filed at: 06/17/2025 2054   2. How many drinks containing alcohol do you have on a typical day you are drinking?  0 Filed at: 06/17/2025 2054   3b. FEMALE Any Age, or MALE 65+: How often do you have 4 or more drinks on one occassion? 0 Filed at: 06/17/2025 2054   Audit-C Score 0 Filed at: 06/17/2025 2054   FANNY: How many times in the past year have you...    Used an illegal drug or used a prescription medication for non-medical reasons? Never Filed at: 06/17/2025 2054                            History of Present Illness       No chief complaint on file.      Past Medical History[1]   Past Surgical History[2]   Family History[3]   Social History[4]   E-Cigarette/Vaping    E-Cigarette Use Never User       E-Cigarette/Vaping Substances    Nicotine No     THC No     CBD No     Flavoring No     Other No     Unknown No       I have reviewed and agree with the history as documented.     65-year-old female  presenting to the emergency department for elevated heart rate, patient developed palpitations and notes that her heart rate was high, came here.  She is in atrial flutter here.  Patient states this is a new diagnosis for her for which she was recently hospitalized and started on new medication.  She has been taking it as prescribed..  Says palpitation she has had no chest pain shortness of breath lightheadedness syncope or presyncope.  No recent fevers or chills.  Was in her usual state of health when the symptoms started.          Review of Systems   Constitutional:  Negative for appetite change, chills, fatigue and fever.   HENT:  Negative for sneezing and sore throat.    Eyes:  Negative for visual disturbance.   Respiratory:  Negative for cough, choking, chest tightness, shortness of breath and wheezing.    Cardiovascular:  Positive for palpitations. Negative for chest pain.   Gastrointestinal:  Negative for abdominal pain, constipation, diarrhea, nausea and vomiting.   Genitourinary:  Negative for difficulty urinating and dysuria.   Neurological:  Negative for dizziness, weakness, light-headedness, numbness and headaches.   All other systems reviewed and are negative.          Objective       ED Triage Vitals [06/17/25 2051]   Temperature Pulse Blood Pressure Respirations SpO2 Patient Position - Orthostatic VS   98.7 °F (37.1 °C) (!) 135 132/87 20 98 % Sitting      Temp Source Heart Rate Source BP Location FiO2 (%) Pain Score    Oral Monitor Left arm -- --      Vitals      Date and Time Temp Pulse SpO2 Resp BP Pain Score FACES Pain Rating User   06/18/25 0029 98.8 °F (37.1 °C) 66 93 % 22 138/79 -- --    06/18/25 0000 -- 66 94 % 17 137/73 -- --    06/17/25 2331 -- 69 98 % -- 130/64 -- --    06/17/25 2301 -- 63 96 % 13 121/80 -- --    06/17/25 2130 -- 68 96 % 18 116/63 -- --    06/17/25 2127 -- 72 95 % 19 -- -- --    06/17/25 2051 98.7 °F (37.1 °C) 135 98 % 20 132/87 -- -- AM            Physical  Exam  Vitals and nursing note reviewed.   Constitutional:       General: She is not in acute distress.     Appearance: She is well-developed. She is not diaphoretic.   HENT:      Head: Normocephalic and atraumatic.     Eyes:      Pupils: Pupils are equal, round, and reactive to light.     Neck:      Vascular: No JVD.      Trachea: No tracheal deviation.     Cardiovascular:      Rate and Rhythm: Normal rate and regular rhythm.      Heart sounds: Normal heart sounds. No murmur heard.     No friction rub. No gallop.   Pulmonary:      Effort: Pulmonary effort is normal. No respiratory distress.      Breath sounds: Normal breath sounds. No wheezing or rales.   Abdominal:      General: Bowel sounds are normal. There is no distension.      Palpations: Abdomen is soft.      Tenderness: There is no abdominal tenderness. There is no guarding or rebound.     Skin:     General: Skin is warm and dry.      Coloration: Skin is not pale.     Neurological:      Mental Status: She is alert and oriented to person, place, and time.      Cranial Nerves: No cranial nerve deficit.      Motor: No abnormal muscle tone.     Psychiatric:         Behavior: Behavior normal.         Results Reviewed       Procedure Component Value Units Date/Time    HS Troponin I 2hr [[id number]]  (Normal) Collected: 06/17/25 2343    Lab Status: Final result Specimen: Blood from Arm, Left Updated: 06/18/25 0019     hs TnI 2hr 3 ng/L      Delta 2hr hsTnI -2 ng/L     HS Troponin 0hr (reflex protocol) [[id number]]  (Normal) Collected: 06/17/25 2127    Lab Status: Final result Specimen: Blood from Arm, Left Updated: 06/17/25 2159     hs TnI 0hr 5 ng/L     Comprehensive metabolic panel [[id number]] Collected: 06/17/25 2127    Lab Status: Final result Specimen: Blood from Arm, Left Updated: 06/17/25 2151     Sodium 140 mmol/L      Potassium 4.2 mmol/L      Chloride 107 mmol/L      CO2 24 mmol/L      ANION GAP 9 mmol/L      BUN 20 mg/dL      Creatinine 1.08 mg/dL       Glucose 99 mg/dL      Calcium 9.5 mg/dL      AST 20 U/L      ALT 24 U/L      Alkaline Phosphatase 93 U/L      Total Protein 7.4 g/dL      Albumin 4.0 g/dL      Total Bilirubin 0.37 mg/dL      eGFR 53 ml/min/1.73sq m     Narrative:      National Kidney Disease Foundation guidelines for Chronic Kidney Disease (CKD):     Stage 1 with normal or high GFR (GFR > 90 mL/min/1.73 square meters)    Stage 2 Mild CKD (GFR = 60-89 mL/min/1.73 square meters)    Stage 3A Moderate CKD (GFR = 45-59 mL/min/1.73 square meters)    Stage 3B Moderate CKD (GFR = 30-44 mL/min/1.73 square meters)    Stage 4 Severe CKD (GFR = 15-29 mL/min/1.73 square meters)    Stage 5 End Stage CKD (GFR <15 mL/min/1.73 square meters)  Note: GFR calculation is accurate only with a steady state creatinine    CBC and differential [[id number]]  (Abnormal) Collected: 06/17/25 2127    Lab Status: Final result Specimen: Blood from Arm, Left Updated: 06/17/25 2132     WBC 11.05 Thousand/uL      RBC 4.96 Million/uL      Hemoglobin 13.5 g/dL      Hematocrit 41.8 %      MCV 84 fL      MCH 27.2 pg      MCHC 32.3 g/dL      RDW 13.7 %      MPV 10.0 fL      Platelets 247 Thousands/uL      nRBC 0 /100 WBCs      Segmented % 51 %      Immature Grans % 1 %      Lymphocytes % 37 %      Monocytes % 8 %      Eosinophils Relative 2 %      Basophils Relative 1 %      Absolute Neutrophils 5.69 Thousands/µL      Absolute Immature Grans 0.05 Thousand/uL      Absolute Lymphocytes 4.12 Thousands/µL      Absolute Monocytes 0.86 Thousand/µL      Eosinophils Absolute 0.27 Thousand/µL      Basophils Absolute 0.06 Thousands/µL             XR chest 1 view portable   Final Interpretation by Mikki Donahue MD (06/18 0542)      No acute cardiopulmonary disease.            Workstation performed: [id number]             Procedures    ED Medication and Procedure Management   Prior to Admission Medications   Prescriptions Last Dose Informant Patient Reported? Taking?   METRONIDAZOLE, TOPICAL,  0.75 % LOTN  Self No No   Sig: Apply topically 2 (two) times a day   Xarelto 20 MG tablet  Self No No   Sig: Take 1 tablet (20 mg total) by mouth daily   amiodarone 200 mg tablet  Self No No   Sig: Take 1 tablet (200 mg total) by mouth 3 (three) times a day with meals for 7 days, THEN 1 tablet (200 mg total) 2 (two) times a day with meals for 7 days, THEN 1 tablet (200 mg total) daily with breakfast.   fluticasone (FLONASE) 50 mcg/act nasal spray  Self No No   Sig: SPRAY 2 SPRAYS INTO EACH NOSTRIL EVERY DAY   losartan (COZAAR) 100 MG tablet  Self No No   Sig: TAKE 1 TABLET BY MOUTH EVERY DAY   metoprolol succinate (TOPROL-XL) 100 mg 24 hr tablet  Self No No   Sig: TAKE 1 TABLET BY MOUTH EVERY DAY   omeprazole (PriLOSEC) 20 mg delayed release capsule  Self No No   Sig: TAKE 1 CAPSULE BY MOUTH EVERY DAY      Facility-Administered Medications: None     Discharge Medication List as of 6/18/2025 12:36 AM        CONTINUE these medications which have NOT CHANGED    Details   amiodarone 200 mg tablet Multiple Dosages:Starting Sat 6/14/2025, Until Fri 6/20/2025 at 2359, THEN Starting Sat 6/21/2025, Until Fri 6/27/2025 at 2359, THEN Starting Sat 6/28/2025, Until Sun 7/27/2025 at 2359Take 1 tablet (200 mg total) by mouth 3 (three) times a day with m eals for 7 days, THEN 1 tablet (200 mg total) 2 (two) times a day with meals for 7 days, THEN 1 tablet (200 mg total) daily with breakfast., Normal      fluticasone (FLONASE) 50 mcg/act nasal spray SPRAY 2 SPRAYS INTO EACH NOSTRIL EVERY DAY, Normal      losartan (COZAAR) 100 MG tablet TAKE 1 TABLET BY MOUTH EVERY DAY, Starting Tue 2/4/2025, Normal      metoprolol succinate (TOPROL-XL) 100 mg 24 hr tablet TAKE 1 TABLET BY MOUTH EVERY DAY, Normal      METRONIDAZOLE, TOPICAL, 0.75 % LOTN Apply topically 2 (two) times a day, Starting Wed 11/20/2024, Normal      omeprazole (PriLOSEC) 20 mg delayed release capsule TAKE 1 CAPSULE BY MOUTH EVERY DAY, Starting Fri 4/4/2025, Normal       Xarelto 20 MG tablet Take 1 tablet (20 mg total) by mouth daily, Starting Fri 10/18/2024, Normal           No discharge procedures on file.  ED SEPSIS DOCUMENTATION   Time reflects when diagnosis was documented in both MDM as applicable and the Disposition within this note       Time User Action Codes Description Comment    6/18/2025 12:35 AM Jesse Freire Add [I48.92] Atrial flutter (HCC)                    [1]   Past Medical History:  Diagnosis Date    Atrial fibrillation (HCC) 2021    Hypertension     Impaired fasting glucose     Pain     Paroxysmal atrial fibrillation (HCC)     Suspected ALISON    [2]   Past Surgical History:  Procedure Laterality Date    HYSTERECTOMY  12/23/2006    OOPHORECTOMY Right     unilateral    OTHER SURGICAL HISTORY      Excision of lesion Genitalia Bengin last assessed 11/01/16    ID COLONOSCOPY FLX DX W/COLLJ SPEC WHEN PFRMD N/A 7/21/2016    Procedure: COLONOSCOPY;  Surgeon: Forrest Blandon MD;  Location: [facility];  Service: Gastroenterology    TUBAL LIGATION     [3]   Family History  Problem Relation Name Age of Onset    COPD Mother Mom     Heart failure Mother Mom     Hyperlipidemia Mother Mom     Hypertension Mother Mom     Throat cancer Mother Mom         cause of death    COPD Father          moderate    No Known Problems Sister      No Known Problems Maternal Grandmother      Breast cancer Paternal Grandmother Grandmother 70    Cancer Maternal Aunt Bronson         Urinary bladder    No Known Problems Maternal Aunt     [4]   Social History  Tobacco Use    Smoking status: Never    Smokeless tobacco: Never    Tobacco comments:     Former   Vaping Use    Vaping status: Never Used   Substance Use Topics    Alcohol use: No     Comment: .    Drug use: No        Jesse Freire MD  07/17/25 0101

## 2025-07-22 DIAGNOSIS — I48.91 ATRIAL FIBRILLATION WITH RVR (HCC): ICD-10-CM

## 2025-07-23 DIAGNOSIS — I10 ESSENTIAL HYPERTENSION: ICD-10-CM

## 2025-07-23 NOTE — TELEPHONE ENCOUNTER
Patient called to request a refill for their Amiodarone advised a refill was requested on 7/23/25 and is pending approval. Patient verbalized understanding and is in agreement.     Does the patient have enough for 3 days?   [x] Yes   [] No - Send as HP to POD       Pt has ablation sched for 8/5/25 only has 7 pills left - rerouting to cardio

## 2025-07-24 RX ORDER — AMIODARONE HYDROCHLORIDE 200 MG/1
TABLET ORAL
Qty: 65 TABLET | Refills: 0 | Status: SHIPPED | OUTPATIENT
Start: 2025-07-24 | End: 2025-09-06

## 2025-07-24 RX ORDER — LOSARTAN POTASSIUM 100 MG/1
100 TABLET ORAL DAILY
Qty: 90 TABLET | Refills: 1 | Status: SHIPPED | OUTPATIENT
Start: 2025-07-24

## 2025-07-28 ENCOUNTER — APPOINTMENT (OUTPATIENT)
Age: 65
End: 2025-07-28
Payer: COMMERCIAL

## 2025-07-28 DIAGNOSIS — I48.92 ATRIAL FLUTTER, UNSPECIFIED TYPE (HCC): ICD-10-CM

## 2025-07-28 DIAGNOSIS — R73.01 IMPAIRED FASTING GLUCOSE: ICD-10-CM

## 2025-07-28 DIAGNOSIS — I48.91 ATRIAL FIBRILLATION WITH RVR (HCC): ICD-10-CM

## 2025-07-28 LAB
ALBUMIN SERPL BCG-MCNC: 4 G/DL (ref 3.5–5)
ALP SERPL-CCNC: 89 U/L (ref 34–104)
ALT SERPL W P-5'-P-CCNC: 16 U/L (ref 7–52)
ANION GAP SERPL CALCULATED.3IONS-SCNC: 8 MMOL/L (ref 4–13)
AST SERPL W P-5'-P-CCNC: 17 U/L (ref 13–39)
BASOPHILS # BLD AUTO: 0.06 THOUSANDS/ÂΜL (ref 0–0.1)
BASOPHILS NFR BLD AUTO: 1 % (ref 0–1)
BILIRUB SERPL-MCNC: 0.48 MG/DL (ref 0.2–1)
BUN SERPL-MCNC: 17 MG/DL (ref 5–25)
CALCIUM SERPL-MCNC: 9.3 MG/DL (ref 8.4–10.2)
CHLORIDE SERPL-SCNC: 107 MMOL/L (ref 96–108)
CO2 SERPL-SCNC: 27 MMOL/L (ref 21–32)
CREAT SERPL-MCNC: 1.18 MG/DL (ref 0.6–1.3)
EOSINOPHIL # BLD AUTO: 0.35 THOUSAND/ÂΜL (ref 0–0.61)
EOSINOPHIL NFR BLD AUTO: 4 % (ref 0–6)
ERYTHROCYTE [DISTWIDTH] IN BLOOD BY AUTOMATED COUNT: 13.8 % (ref 11.6–15.1)
EST. AVERAGE GLUCOSE BLD GHB EST-MCNC: 128 MG/DL
GFR SERPL CREATININE-BSD FRML MDRD: 48 ML/MIN/1.73SQ M
GLUCOSE P FAST SERPL-MCNC: 89 MG/DL (ref 65–99)
HBA1C MFR BLD: 6.1 %
HCT VFR BLD AUTO: 44.2 % (ref 34.8–46.1)
HGB BLD-MCNC: 14.3 G/DL (ref 11.5–15.4)
IMM GRANULOCYTES # BLD AUTO: 0.05 THOUSAND/UL (ref 0–0.2)
IMM GRANULOCYTES NFR BLD AUTO: 1 % (ref 0–2)
INR PPP: 1.65 (ref 0.85–1.19)
LYMPHOCYTES # BLD AUTO: 3.66 THOUSANDS/ÂΜL (ref 0.6–4.47)
LYMPHOCYTES NFR BLD AUTO: 39 % (ref 14–44)
MCH RBC QN AUTO: 27.6 PG (ref 26.8–34.3)
MCHC RBC AUTO-ENTMCNC: 32.4 G/DL (ref 31.4–37.4)
MCV RBC AUTO: 85 FL (ref 82–98)
MONOCYTES # BLD AUTO: 0.78 THOUSAND/ÂΜL (ref 0.17–1.22)
MONOCYTES NFR BLD AUTO: 8 % (ref 4–12)
NEUTROPHILS # BLD AUTO: 4.43 THOUSANDS/ÂΜL (ref 1.85–7.62)
NEUTS SEG NFR BLD AUTO: 47 % (ref 43–75)
NRBC BLD AUTO-RTO: 0 /100 WBCS
PLATELET # BLD AUTO: 251 THOUSANDS/UL (ref 149–390)
PMV BLD AUTO: 10.7 FL (ref 8.9–12.7)
POTASSIUM SERPL-SCNC: 4.4 MMOL/L (ref 3.5–5.3)
PROT SERPL-MCNC: 7.1 G/DL (ref 6.4–8.4)
PROTHROMBIN TIME: 19.6 SECONDS (ref 12.3–15)
RBC # BLD AUTO: 5.18 MILLION/UL (ref 3.81–5.12)
SODIUM SERPL-SCNC: 142 MMOL/L (ref 135–147)
WBC # BLD AUTO: 9.33 THOUSAND/UL (ref 4.31–10.16)

## 2025-07-28 PROCEDURE — 80053 COMPREHEN METABOLIC PANEL: CPT

## 2025-07-28 PROCEDURE — 85610 PROTHROMBIN TIME: CPT

## 2025-07-28 PROCEDURE — 36415 COLL VENOUS BLD VENIPUNCTURE: CPT

## 2025-07-28 PROCEDURE — 85025 COMPLETE CBC W/AUTO DIFF WBC: CPT

## 2025-07-28 PROCEDURE — 83036 HEMOGLOBIN GLYCOSYLATED A1C: CPT

## 2025-07-29 ENCOUNTER — RESULTS FOLLOW-UP (OUTPATIENT)
Dept: CARDIOLOGY CLINIC | Facility: CLINIC | Age: 65
End: 2025-07-29

## 2025-07-30 NOTE — TELEPHONE ENCOUNTER
Patient is questioning her pre-op instructions. Patient wants to know if she has to hold her Losartan 24 hours prior to surgery. She is worried about going with out the medication.     Please advise

## 2025-08-05 ENCOUNTER — HOSPITAL ENCOUNTER (OUTPATIENT)
Dept: NON INVASIVE DIAGNOSTICS | Facility: HOSPITAL | Age: 65
Discharge: HOME | End: 2025-08-05
Attending: INTERNAL MEDICINE
Payer: COMMERCIAL

## 2025-08-05 ENCOUNTER — ANESTHESIA EVENT (OUTPATIENT)
Dept: NON INVASIVE DIAGNOSTICS | Facility: HOSPITAL | Age: 65
End: 2025-08-05
Payer: COMMERCIAL

## 2025-08-05 ENCOUNTER — ANESTHESIA (OUTPATIENT)
Dept: NON INVASIVE DIAGNOSTICS | Facility: HOSPITAL | Age: 65
End: 2025-08-05
Payer: COMMERCIAL

## 2025-08-05 ENCOUNTER — HOSPITAL ENCOUNTER (OUTPATIENT)
Facility: HOSPITAL | Age: 65
Setting detail: OUTPATIENT SURGERY
Discharge: HOME | End: 2025-08-06
Attending: INTERNAL MEDICINE | Admitting: INTERNAL MEDICINE
Payer: COMMERCIAL

## 2025-08-05 DIAGNOSIS — I48.91 ATRIAL FIBRILLATION WITH RVR (HCC): ICD-10-CM

## 2025-08-05 DIAGNOSIS — I48.92 ATRIAL FLUTTER, UNSPECIFIED TYPE (HCC): ICD-10-CM

## 2025-08-05 LAB
ANION GAP SERPL CALCULATED.3IONS-SCNC: 11 MMOL/L (ref 4–13)
ATRIAL RATE: 63 BPM
BASOPHILS # BLD AUTO: 0.05 THOUSANDS/ÂΜL (ref 0–0.1)
BASOPHILS NFR BLD AUTO: 1 % (ref 0–1)
BUN SERPL-MCNC: 18 MG/DL (ref 5–25)
CALCIUM SERPL-MCNC: 9.3 MG/DL (ref 8.4–10.2)
CHLORIDE SERPL-SCNC: 106 MMOL/L (ref 96–108)
CO2 SERPL-SCNC: 23 MMOL/L (ref 21–32)
CREAT SERPL-MCNC: 1.16 MG/DL (ref 0.6–1.3)
EOSINOPHIL # BLD AUTO: 0.37 THOUSAND/ÂΜL (ref 0–0.61)
EOSINOPHIL NFR BLD AUTO: 5 % (ref 0–6)
ERYTHROCYTE [DISTWIDTH] IN BLOOD BY AUTOMATED COUNT: 13.9 % (ref 11.6–15.1)
GFR SERPL CREATININE-BSD FRML MDRD: 49 ML/MIN/1.73SQ M
GLUCOSE P FAST SERPL-MCNC: 91 MG/DL (ref 65–99)
GLUCOSE SERPL-MCNC: 91 MG/DL (ref 65–140)
HCT VFR BLD AUTO: 43.2 % (ref 34.8–46.1)
HGB BLD-MCNC: 14 G/DL (ref 11.5–15.4)
IMM GRANULOCYTES # BLD AUTO: 0.05 THOUSAND/UL (ref 0–0.2)
IMM GRANULOCYTES NFR BLD AUTO: 1 % (ref 0–2)
INR PPP: 1.27 (ref 0.85–1.19)
KCT BLD-ACNC: 389 SEC (ref 89–137)
KCT BLD-ACNC: 446 SEC (ref 89–137)
KCT BLD-ACNC: 701 SEC (ref 89–137)
KCT BLD-ACNC: 758 SEC (ref 89–137)
LYMPHOCYTES # BLD AUTO: 2.41 THOUSANDS/ÂΜL (ref 0.6–4.47)
LYMPHOCYTES NFR BLD AUTO: 30 % (ref 14–44)
MCH RBC QN AUTO: 27.6 PG (ref 26.8–34.3)
MCHC RBC AUTO-ENTMCNC: 32.4 G/DL (ref 31.4–37.4)
MCV RBC AUTO: 85 FL (ref 82–98)
MONOCYTES # BLD AUTO: 0.74 THOUSAND/ÂΜL (ref 0.17–1.22)
MONOCYTES NFR BLD AUTO: 9 % (ref 4–12)
NEUTROPHILS # BLD AUTO: 4.55 THOUSANDS/ÂΜL (ref 1.85–7.62)
NEUTS SEG NFR BLD AUTO: 54 % (ref 43–75)
NRBC BLD AUTO-RTO: 0 /100 WBCS
P AXIS: 48 DEGREES
PLATELET # BLD AUTO: 232 THOUSANDS/UL (ref 149–390)
PMV BLD AUTO: 10 FL (ref 8.9–12.7)
POTASSIUM SERPL-SCNC: 4.2 MMOL/L (ref 3.5–5.3)
PR INTERVAL: 164 MS
PROTHROMBIN TIME: 16.1 SECONDS (ref 12.3–15)
QRS AXIS: 57 DEGREES
QRSD INTERVAL: 104 MS
QT INTERVAL: 436 MS
QTC INTERVAL: 447 MS
RBC # BLD AUTO: 5.07 MILLION/UL (ref 3.81–5.12)
SODIUM SERPL-SCNC: 140 MMOL/L (ref 135–147)
SPECIMEN SOURCE: ABNORMAL
T WAVE AXIS: 30 DEGREES
VENTRICULAR RATE: 63 BPM
WBC # BLD AUTO: 8.17 THOUSAND/UL (ref 4.31–10.16)

## 2025-08-05 PROCEDURE — 93657 TX L/R ATRIAL FIB ADDL: CPT | Performed by: INTERNAL MEDICINE

## 2025-08-05 PROCEDURE — C1894 INTRO/SHEATH, NON-LASER: HCPCS | Performed by: INTERNAL MEDICINE

## 2025-08-05 PROCEDURE — 93656 COMPRE EP EVAL ABLTJ ATR FIB: CPT | Performed by: INTERNAL MEDICINE

## 2025-08-05 PROCEDURE — 76937 US GUIDE VASCULAR ACCESS: CPT | Performed by: INTERNAL MEDICINE

## 2025-08-05 PROCEDURE — C1759 CATH, INTRA ECHOCARDIOGRAPHY: HCPCS | Performed by: INTERNAL MEDICINE

## 2025-08-05 PROCEDURE — C1760 CLOSURE DEV, VASC: HCPCS | Performed by: INTERNAL MEDICINE

## 2025-08-05 PROCEDURE — 93005 ELECTROCARDIOGRAM TRACING: CPT

## 2025-08-05 PROCEDURE — 80048 BASIC METABOLIC PNL TOTAL CA: CPT | Performed by: PHYSICIAN ASSISTANT

## 2025-08-05 PROCEDURE — 85347 COAGULATION TIME ACTIVATED: CPT

## 2025-08-05 PROCEDURE — C1766 INTRO/SHEATH,STRBLE,NON-PEEL: HCPCS | Performed by: INTERNAL MEDICINE

## 2025-08-05 PROCEDURE — 93655 ICAR CATH ABLTJ DSCRT ARRHYT: CPT | Performed by: INTERNAL MEDICINE

## 2025-08-05 PROCEDURE — 85025 COMPLETE CBC W/AUTO DIFF WBC: CPT | Performed by: PHYSICIAN ASSISTANT

## 2025-08-05 PROCEDURE — C1730 CATH, EP, 19 OR FEW ELECT: HCPCS | Performed by: INTERNAL MEDICINE

## 2025-08-05 PROCEDURE — C1769 GUIDE WIRE: HCPCS | Performed by: INTERNAL MEDICINE

## 2025-08-05 PROCEDURE — C1733 CATH, EP, OTHR THAN COOL-TIP: HCPCS | Performed by: INTERNAL MEDICINE

## 2025-08-05 PROCEDURE — C1732 CATH, EP, DIAG/ABL, 3D/VECT: HCPCS | Performed by: INTERNAL MEDICINE

## 2025-08-05 PROCEDURE — 85610 PROTHROMBIN TIME: CPT | Performed by: PHYSICIAN ASSISTANT

## 2025-08-05 DEVICE — IMPLANTABLE DEVICE: Type: IMPLANTABLE DEVICE | Site: GROIN | Status: FUNCTIONAL

## 2025-08-05 RX ORDER — ONDANSETRON 2 MG/ML
INJECTION INTRAMUSCULAR; INTRAVENOUS AS NEEDED
Status: DISCONTINUED | OUTPATIENT
Start: 2025-08-05 | End: 2025-08-05

## 2025-08-05 RX ORDER — SODIUM CHLORIDE 9 MG/ML
20 INJECTION, SOLUTION INTRAVENOUS ONCE
Status: COMPLETED | OUTPATIENT
Start: 2025-08-05 | End: 2025-08-05

## 2025-08-05 RX ORDER — GLYCOPYRROLATE 0.2 MG/ML
INJECTION INTRAMUSCULAR; INTRAVENOUS AS NEEDED
Status: DISCONTINUED | OUTPATIENT
Start: 2025-08-05 | End: 2025-08-05

## 2025-08-05 RX ORDER — PANTOPRAZOLE SODIUM 40 MG/1
40 TABLET, DELAYED RELEASE ORAL
Status: DISCONTINUED | OUTPATIENT
Start: 2025-08-05 | End: 2025-08-06 | Stop reason: HOSPADM

## 2025-08-05 RX ORDER — FENTANYL CITRATE 50 UG/ML
INJECTION, SOLUTION INTRAMUSCULAR; INTRAVENOUS AS NEEDED
Status: DISCONTINUED | OUTPATIENT
Start: 2025-08-05 | End: 2025-08-05

## 2025-08-05 RX ORDER — PROPOFOL 10 MG/ML
INJECTION, EMULSION INTRAVENOUS AS NEEDED
Status: DISCONTINUED | OUTPATIENT
Start: 2025-08-05 | End: 2025-08-05

## 2025-08-05 RX ORDER — HEPARIN SODIUM 10000 [USP'U]/100ML
INJECTION, SOLUTION INTRAVENOUS
Status: DISCONTINUED | OUTPATIENT
Start: 2025-08-05 | End: 2025-08-05 | Stop reason: HOSPADM

## 2025-08-05 RX ORDER — SODIUM CHLORIDE 9 MG/ML
INJECTION, SOLUTION INTRAVENOUS CONTINUOUS PRN
Status: DISCONTINUED | OUTPATIENT
Start: 2025-08-05 | End: 2025-08-05

## 2025-08-05 RX ORDER — ROCURONIUM BROMIDE 10 MG/ML
INJECTION, SOLUTION INTRAVENOUS AS NEEDED
Status: DISCONTINUED | OUTPATIENT
Start: 2025-08-05 | End: 2025-08-05

## 2025-08-05 RX ORDER — NEOSTIGMINE METHYLSULFATE 1 MG/ML
INJECTION INTRAVENOUS AS NEEDED
Status: DISCONTINUED | OUTPATIENT
Start: 2025-08-05 | End: 2025-08-05

## 2025-08-05 RX ORDER — ASPIRIN 81 MG/1
81 TABLET ORAL DAILY
Status: DISCONTINUED | OUTPATIENT
Start: 2025-08-05 | End: 2025-08-05

## 2025-08-05 RX ORDER — ASPIRIN 81 MG/1
81 TABLET ORAL DAILY
Status: DISCONTINUED | OUTPATIENT
Start: 2025-08-06 | End: 2025-08-06 | Stop reason: HOSPADM

## 2025-08-05 RX ORDER — FENTANYL CITRATE/PF 50 MCG/ML
25 SYRINGE (ML) INJECTION
Refills: 0 | Status: DISCONTINUED | OUTPATIENT
Start: 2025-08-05 | End: 2025-08-05 | Stop reason: HOSPADM

## 2025-08-05 RX ORDER — AMIODARONE HYDROCHLORIDE 200 MG/1
200 TABLET ORAL
Status: DISCONTINUED | OUTPATIENT
Start: 2025-08-06 | End: 2025-08-06 | Stop reason: HOSPADM

## 2025-08-05 RX ORDER — BENZOCAINE/MENTHOL 6 MG-10 MG
LOZENGE MUCOUS MEMBRANE 4 TIMES DAILY PRN
Status: DISCONTINUED | OUTPATIENT
Start: 2025-08-05 | End: 2025-08-06 | Stop reason: HOSPADM

## 2025-08-05 RX ORDER — HEPARIN SODIUM 1000 [USP'U]/ML
INJECTION, SOLUTION INTRAVENOUS; SUBCUTANEOUS CODE/TRAUMA/SEDATION MEDICATION
Status: DISCONTINUED | OUTPATIENT
Start: 2025-08-05 | End: 2025-08-05 | Stop reason: HOSPADM

## 2025-08-05 RX ORDER — SODIUM CHLORIDE 9 MG/ML
50 INJECTION, SOLUTION INTRAVENOUS CONTINUOUS
Status: DISCONTINUED | OUTPATIENT
Start: 2025-08-05 | End: 2025-08-06 | Stop reason: HOSPADM

## 2025-08-05 RX ORDER — NITROGLYCERIN 20 MG/100ML
INJECTION INTRAVENOUS CODE/TRAUMA/SEDATION MEDICATION
Status: DISCONTINUED | OUTPATIENT
Start: 2025-08-05 | End: 2025-08-05 | Stop reason: HOSPADM

## 2025-08-05 RX ORDER — PROTAMINE SULFATE 10 MG/ML
INJECTION, SOLUTION INTRAVENOUS AS NEEDED
Status: DISCONTINUED | OUTPATIENT
Start: 2025-08-05 | End: 2025-08-05

## 2025-08-05 RX ORDER — LIDOCAINE HYDROCHLORIDE 10 MG/ML
INJECTION, SOLUTION EPIDURAL; INFILTRATION; INTRACAUDAL; PERINEURAL AS NEEDED
Status: DISCONTINUED | OUTPATIENT
Start: 2025-08-05 | End: 2025-08-05

## 2025-08-05 RX ORDER — CEFAZOLIN SODIUM 1 G/3ML
INJECTION, POWDER, FOR SOLUTION INTRAMUSCULAR; INTRAVENOUS AS NEEDED
Status: DISCONTINUED | OUTPATIENT
Start: 2025-08-05 | End: 2025-08-05

## 2025-08-05 RX ORDER — ONDANSETRON 2 MG/ML
4 INJECTION INTRAMUSCULAR; INTRAVENOUS ONCE AS NEEDED
Status: DISCONTINUED | OUTPATIENT
Start: 2025-08-05 | End: 2025-08-05 | Stop reason: HOSPADM

## 2025-08-05 RX ORDER — DEXAMETHASONE SODIUM PHOSPHATE 10 MG/ML
INJECTION, SOLUTION INTRAMUSCULAR; INTRAVENOUS AS NEEDED
Status: DISCONTINUED | OUTPATIENT
Start: 2025-08-05 | End: 2025-08-05

## 2025-08-05 RX ORDER — LOSARTAN POTASSIUM 50 MG/1
100 TABLET ORAL DAILY
Status: DISCONTINUED | OUTPATIENT
Start: 2025-08-06 | End: 2025-08-06 | Stop reason: HOSPADM

## 2025-08-05 RX ORDER — ACETAMINOPHEN 325 MG/1
650 TABLET ORAL EVERY 4 HOURS PRN
Status: DISCONTINUED | OUTPATIENT
Start: 2025-08-05 | End: 2025-08-06 | Stop reason: HOSPADM

## 2025-08-05 RX ADMIN — FENTANYL CITRATE 100 MCG: 50 INJECTION INTRAMUSCULAR; INTRAVENOUS at 14:23

## 2025-08-05 RX ADMIN — DEXAMETHASONE SODIUM PHOSPHATE 10 MG: 10 INJECTION, SOLUTION INTRAMUSCULAR; INTRAVENOUS at 14:32

## 2025-08-05 RX ADMIN — NOREPINEPHRINE BITARTRATE 8 MCG: 1 INJECTION, SOLUTION, CONCENTRATE INTRAVENOUS at 14:46

## 2025-08-05 RX ADMIN — LIDOCAINE HYDROCHLORIDE 50 MG: 10 INJECTION, SOLUTION EPIDURAL; INFILTRATION; INTRACAUDAL; PERINEURAL at 14:23

## 2025-08-05 RX ADMIN — FENTANYL CITRATE 25 MCG: 50 INJECTION INTRAMUSCULAR; INTRAVENOUS at 17:16

## 2025-08-05 RX ADMIN — SODIUM CHLORIDE: 0.9 INJECTION, SOLUTION INTRAVENOUS at 16:18

## 2025-08-05 RX ADMIN — PROPOFOL 110 MG: 10 INJECTION, EMULSION INTRAVENOUS at 14:23

## 2025-08-05 RX ADMIN — ROCURONIUM BROMIDE 20 MG: 10 INJECTION, SOLUTION INTRAVENOUS at 15:03

## 2025-08-05 RX ADMIN — PROTAMINE SULFATE 10 MG: 10 INJECTION, SOLUTION INTRAVENOUS at 15:29

## 2025-08-05 RX ADMIN — CEFAZOLIN 2000 MG: 1 INJECTION, POWDER, FOR SOLUTION INTRAMUSCULAR; INTRAVENOUS at 14:50

## 2025-08-05 RX ADMIN — PROTAMINE SULFATE 60 MG: 10 INJECTION, SOLUTION INTRAVENOUS at 16:07

## 2025-08-05 RX ADMIN — GLYCOPYRROLATE 0.4 MG: 0.2 INJECTION INTRAMUSCULAR; INTRAVENOUS at 16:20

## 2025-08-05 RX ADMIN — GLYCOPYRROLATE 0.2 MG: 0.2 INJECTION INTRAMUSCULAR; INTRAVENOUS at 15:12

## 2025-08-05 RX ADMIN — ROCURONIUM BROMIDE 15 MG: 10 INJECTION, SOLUTION INTRAVENOUS at 15:49

## 2025-08-05 RX ADMIN — SODIUM CHLORIDE 20 ML/HR: 0.9 INJECTION, SOLUTION INTRAVENOUS at 11:24

## 2025-08-05 RX ADMIN — NOREPINEPHRINE BITARTRATE 8 MCG: 1 INJECTION, SOLUTION, CONCENTRATE INTRAVENOUS at 14:58

## 2025-08-05 RX ADMIN — PROTAMINE SULFATE 10 MG: 10 INJECTION, SOLUTION INTRAVENOUS at 15:28

## 2025-08-05 RX ADMIN — ROCURONIUM BROMIDE 50 MG: 10 INJECTION, SOLUTION INTRAVENOUS at 14:23

## 2025-08-05 RX ADMIN — NEOSTIGMINE METHYLSULFATE 3 MG: 1 INJECTION INTRAVENOUS at 16:20

## 2025-08-05 RX ADMIN — FENTANYL CITRATE 25 MCG: 50 INJECTION INTRAMUSCULAR; INTRAVENOUS at 16:55

## 2025-08-05 RX ADMIN — ONDANSETRON 4 MG: 2 INJECTION, SOLUTION INTRAMUSCULAR; INTRAVENOUS at 16:15

## 2025-08-05 RX ADMIN — SODIUM CHLORIDE: 0.9 INJECTION, SOLUTION INTRAVENOUS at 14:09

## 2025-08-06 VITALS
DIASTOLIC BLOOD PRESSURE: 69 MMHG | RESPIRATION RATE: 17 BRPM | HEART RATE: 71 BPM | HEIGHT: 64 IN | BODY MASS INDEX: 37.56 KG/M2 | WEIGHT: 220 LBS | TEMPERATURE: 98.2 F | SYSTOLIC BLOOD PRESSURE: 124 MMHG | OXYGEN SATURATION: 95 %

## 2025-08-06 LAB
ANION GAP SERPL CALCULATED.3IONS-SCNC: 7 MMOL/L (ref 4–13)
ATRIAL RATE: 69 BPM
BUN SERPL-MCNC: 19 MG/DL (ref 5–25)
CALCIUM SERPL-MCNC: 8.3 MG/DL (ref 8.4–10.2)
CHLORIDE SERPL-SCNC: 108 MMOL/L (ref 96–108)
CO2 SERPL-SCNC: 22 MMOL/L (ref 21–32)
CREAT SERPL-MCNC: 1.06 MG/DL (ref 0.6–1.3)
ERYTHROCYTE [DISTWIDTH] IN BLOOD BY AUTOMATED COUNT: 14.6 % (ref 11.6–15.1)
GFR SERPL CREATININE-BSD FRML MDRD: 54 ML/MIN/1.73SQ M
GLUCOSE SERPL-MCNC: 136 MG/DL (ref 65–140)
HCT VFR BLD AUTO: 41.1 % (ref 34.8–46.1)
HGB BLD-MCNC: 13 G/DL (ref 11.5–15.4)
INR PPP: 1.6 (ref 0.85–1.19)
MCH RBC QN AUTO: 27.2 PG (ref 26.8–34.3)
MCHC RBC AUTO-ENTMCNC: 31.6 G/DL (ref 31.4–37.4)
MCV RBC AUTO: 86 FL (ref 82–98)
P AXIS: 71 DEGREES
PLATELET # BLD AUTO: 239 THOUSANDS/UL (ref 149–390)
PMV BLD AUTO: 10.2 FL (ref 8.9–12.7)
POTASSIUM SERPL-SCNC: 4.3 MMOL/L (ref 3.5–5.3)
PR INTERVAL: 176 MS
PROTHROMBIN TIME: 19.2 SECONDS (ref 12.3–15)
QRS AXIS: 97 DEGREES
QRSD INTERVAL: 110 MS
QT INTERVAL: 436 MS
QTC INTERVAL: 467 MS
RBC # BLD AUTO: 4.78 MILLION/UL (ref 3.81–5.12)
SODIUM SERPL-SCNC: 137 MMOL/L (ref 135–147)
T WAVE AXIS: 66 DEGREES
VENTRICULAR RATE: 69 BPM
WBC # BLD AUTO: 10.9 THOUSAND/UL (ref 4.31–10.16)

## 2025-08-06 PROCEDURE — NC001 PR NO CHARGE: Performed by: PHYSICIAN ASSISTANT

## 2025-08-06 PROCEDURE — 85610 PROTHROMBIN TIME: CPT | Performed by: PHYSICIAN ASSISTANT

## 2025-08-06 PROCEDURE — 93010 ELECTROCARDIOGRAM REPORT: CPT | Performed by: INTERNAL MEDICINE

## 2025-08-06 PROCEDURE — 85027 COMPLETE CBC AUTOMATED: CPT | Performed by: PHYSICIAN ASSISTANT

## 2025-08-06 PROCEDURE — 80048 BASIC METABOLIC PNL TOTAL CA: CPT | Performed by: PHYSICIAN ASSISTANT

## 2025-08-06 RX ORDER — METOPROLOL SUCCINATE 100 MG/1
100 TABLET, EXTENDED RELEASE ORAL DAILY
Status: DISCONTINUED | OUTPATIENT
Start: 2025-08-06 | End: 2025-08-06 | Stop reason: HOSPADM

## 2025-08-06 RX ADMIN — ASPIRIN 81 MG: 81 TABLET, COATED ORAL at 09:53

## 2025-08-06 RX ADMIN — PANTOPRAZOLE SODIUM 40 MG: 40 TABLET, DELAYED RELEASE ORAL at 05:50

## 2025-08-06 RX ADMIN — LOSARTAN POTASSIUM 100 MG: 50 TABLET, FILM COATED ORAL at 09:53

## 2025-08-06 RX ADMIN — RIVAROXABAN 20 MG: 20 TABLET, FILM COATED ORAL at 09:53

## 2025-08-06 RX ADMIN — METOPROLOL SUCCINATE 100 MG: 100 TABLET, EXTENDED RELEASE ORAL at 12:45

## 2025-08-06 RX ADMIN — ACETAMINOPHEN 650 MG: 325 TABLET ORAL at 02:30

## 2025-08-06 RX ADMIN — AMIODARONE HYDROCHLORIDE 200 MG: 200 TABLET ORAL at 09:53

## 2025-08-08 ENCOUNTER — NURSE TRIAGE (OUTPATIENT)
Age: 65
End: 2025-08-08

## 2025-08-08 ENCOUNTER — TELEPHONE (OUTPATIENT)
Dept: CARDIOLOGY CLINIC | Facility: CLINIC | Age: 65
End: 2025-08-08

## 2025-08-10 ENCOUNTER — HOSPITAL ENCOUNTER (EMERGENCY)
Facility: HOSPITAL | Age: 65
Discharge: HOME | End: 2025-08-11
Payer: COMMERCIAL

## 2025-08-16 DIAGNOSIS — I48.91 ATRIAL FIBRILLATION WITH RVR (HCC): ICD-10-CM

## 2025-08-18 RX ORDER — AMIODARONE HYDROCHLORIDE 200 MG/1
TABLET ORAL
Qty: 195 TABLET | Refills: 1 | Status: SHIPPED | OUTPATIENT
Start: 2025-08-18 | End: 2025-10-01

## (undated) DEVICE — SHEATH STEERABLE FARADRIVE

## (undated) DEVICE — Device

## (undated) DEVICE — CATH ABLATION FARAWAVE PULSED FIELD 31MM

## (undated) DEVICE — CABLE CATH CONNECTION FARASTAR

## (undated) DEVICE — CATH ULTRASOUND ACUNAV ICE 8FR 90CM GE VIVID-I